# Patient Record
Sex: MALE | Race: WHITE | NOT HISPANIC OR LATINO | Employment: FULL TIME | URBAN - METROPOLITAN AREA
[De-identification: names, ages, dates, MRNs, and addresses within clinical notes are randomized per-mention and may not be internally consistent; named-entity substitution may affect disease eponyms.]

---

## 2017-02-28 ENCOUNTER — OFFICE VISIT (OUTPATIENT)
Dept: FAMILY MEDICINE CLINIC | Facility: CLINIC | Age: 47
End: 2017-02-28

## 2017-02-28 VITALS
OXYGEN SATURATION: 99 % | BODY MASS INDEX: 27.2 KG/M2 | SYSTOLIC BLOOD PRESSURE: 120 MMHG | HEIGHT: 70 IN | DIASTOLIC BLOOD PRESSURE: 66 MMHG | HEART RATE: 104 BPM | TEMPERATURE: 97.8 F | WEIGHT: 190 LBS

## 2017-02-28 DIAGNOSIS — Z72.0 NICOTINE ABUSE: ICD-10-CM

## 2017-02-28 DIAGNOSIS — Z72.0 TOBACCO ABUSE: ICD-10-CM

## 2017-02-28 DIAGNOSIS — M54.5 LOW BACK PAIN, UNSPECIFIED BACK PAIN LATERALITY, UNSPECIFIED CHRONICITY, WITH SCIATICA PRESENCE UNSPECIFIED: ICD-10-CM

## 2017-02-28 DIAGNOSIS — N52.9 ERECTILE DYSFUNCTION, UNSPECIFIED ERECTILE DYSFUNCTION TYPE: Primary | ICD-10-CM

## 2017-03-02 LAB
ALBUMIN SERPL-MCNC: 4 G/DL (ref 3.5–5.2)
ALBUMIN/GLOB SERPL: 1.5 G/DL
ALP SERPL-CCNC: 84 U/L (ref 39–117)
ALT SERPL W P-5'-P-CCNC: 20 U/L (ref 1–41)
ANION GAP SERPL CALCULATED.3IONS-SCNC: 12.7 MMOL/L
AST SERPL-CCNC: 23 U/L (ref 1–40)
BILIRUB SERPL-MCNC: 0.3 MG/DL (ref 0.1–1.2)
BUN BLD-MCNC: 13 MG/DL (ref 6–20)
BUN/CREAT SERPL: 12.6 (ref 7–25)
CALCIUM SPEC-SCNC: 9.6 MG/DL (ref 8.6–10.5)
CHLORIDE SERPL-SCNC: 101 MMOL/L (ref 98–107)
CHOLEST SERPL-MCNC: 166 MG/DL (ref 0–200)
CO2 SERPL-SCNC: 30.3 MMOL/L (ref 22–29)
CREAT BLD-MCNC: 1.03 MG/DL (ref 0.76–1.27)
ERYTHROCYTE [DISTWIDTH] IN BLOOD BY AUTOMATED COUNT: 11.8 % (ref 4.5–15)
GFR SERPL CREATININE-BSD FRML MDRD: 77 ML/MIN/1.73
GLOBULIN UR ELPH-MCNC: 2.6 GM/DL
GLUCOSE BLD-MCNC: 100 MG/DL (ref 65–99)
HCT VFR BLD AUTO: 45.5 % (ref 35–60)
HDLC SERPL-MCNC: 43 MG/DL (ref 40–60)
HGB BLD-MCNC: 15.2 G/DL (ref 13.5–18)
LDLC SERPL CALC-MCNC: 85 MG/DL (ref 0–100)
LDLC/HDLC SERPL: 1.98 {RATIO}
LYMPHOCYTES # BLD AUTO: 2 10*3/MM3 (ref 1.2–3.4)
LYMPHOCYTES NFR BLD AUTO: 33.2 % (ref 21–51)
MCH RBC QN AUTO: 31.3 PG (ref 26.1–33.1)
MCHC RBC AUTO-ENTMCNC: 33.3 G/DL (ref 33–37)
MCV RBC AUTO: 93.8 FL (ref 80–99)
MONOCYTES # BLD AUTO: 0.5 10*3/MM3 (ref 0.1–0.6)
MONOCYTES NFR BLD AUTO: 7.9 % (ref 2–9)
NEUTROPHILS # BLD AUTO: 3.5 10*3/MM3 (ref 1.4–6.5)
NEUTROPHILS NFR BLD AUTO: 58.9 % (ref 42–75)
PLATELET # BLD AUTO: 207 10*3/MM3 (ref 150–450)
PMV BLD AUTO: 7.6 FL (ref 7.1–10.5)
POTASSIUM BLD-SCNC: 5 MMOL/L (ref 3.5–5.2)
PROT SERPL-MCNC: 6.6 G/DL (ref 6–8.5)
RBC # BLD AUTO: 4.85 10*6/MM3 (ref 4–6)
SODIUM BLD-SCNC: 144 MMOL/L (ref 136–145)
TRIGL SERPL-MCNC: 189 MG/DL (ref 0–150)
VLDLC SERPL-MCNC: 37.8 MG/DL (ref 5–40)
WBC NRBC COR # BLD: 5.9 10*3/MM3 (ref 4.5–10)

## 2017-03-02 PROCEDURE — 80053 COMPREHEN METABOLIC PANEL: CPT | Performed by: FAMILY MEDICINE

## 2017-03-02 PROCEDURE — 85025 COMPLETE CBC W/AUTO DIFF WBC: CPT | Performed by: FAMILY MEDICINE

## 2017-03-02 PROCEDURE — 80061 LIPID PANEL: CPT | Performed by: FAMILY MEDICINE

## 2017-03-03 LAB
CONV COMMENT: NORMAL
TESTOST SERPL-MCNC: 376 NG/DL (ref 348–1197)

## 2017-05-11 ENCOUNTER — OFFICE VISIT (OUTPATIENT)
Dept: FAMILY MEDICINE CLINIC | Facility: CLINIC | Age: 47
End: 2017-05-11

## 2017-05-11 VITALS
WEIGHT: 184 LBS | DIASTOLIC BLOOD PRESSURE: 78 MMHG | SYSTOLIC BLOOD PRESSURE: 100 MMHG | HEART RATE: 69 BPM | OXYGEN SATURATION: 95 % | BODY MASS INDEX: 26.34 KG/M2 | RESPIRATION RATE: 16 BRPM | TEMPERATURE: 97.4 F | HEIGHT: 70 IN

## 2017-05-11 DIAGNOSIS — J02.9 ACUTE PHARYNGITIS, UNSPECIFIED ETIOLOGY: Primary | ICD-10-CM

## 2017-05-11 LAB
HETEROPH AB SER QL LA: NEGATIVE
S PYO AG THROAT QL: NEGATIVE

## 2017-05-11 PROCEDURE — 99213 OFFICE O/P EST LOW 20 MIN: CPT | Performed by: NURSE PRACTITIONER

## 2017-05-11 PROCEDURE — 87880 STREP A ASSAY W/OPTIC: CPT | Performed by: NURSE PRACTITIONER

## 2017-05-11 PROCEDURE — 86308 HETEROPHILE ANTIBODY SCREEN: CPT | Performed by: NURSE PRACTITIONER

## 2017-05-11 PROCEDURE — 36415 COLL VENOUS BLD VENIPUNCTURE: CPT | Performed by: NURSE PRACTITIONER

## 2017-05-11 RX ORDER — ACETAMINOPHEN,DIPHENHYDRAMINE HCL 500; 25 MG/1; MG/1
TABLET, FILM COATED ORAL
COMMUNITY
End: 2019-02-07

## 2017-05-11 RX ORDER — CLINDAMYCIN HYDROCHLORIDE 300 MG/1
300 CAPSULE ORAL 3 TIMES DAILY
Qty: 30 CAPSULE | Refills: 0 | Status: SHIPPED | OUTPATIENT
Start: 2017-05-11 | End: 2019-02-07

## 2017-05-16 ENCOUNTER — TELEPHONE (OUTPATIENT)
Dept: FAMILY MEDICINE CLINIC | Facility: CLINIC | Age: 47
End: 2017-05-16

## 2017-05-28 PROCEDURE — 93000 ELECTROCARDIOGRAM COMPLETE: CPT | Performed by: FAMILY MEDICINE

## 2017-05-31 ENCOUNTER — OFFICE VISIT (OUTPATIENT)
Dept: FAMILY MEDICINE CLINIC | Facility: CLINIC | Age: 47
End: 2017-05-31

## 2017-05-31 VITALS
WEIGHT: 191 LBS | OXYGEN SATURATION: 95 % | HEART RATE: 58 BPM | DIASTOLIC BLOOD PRESSURE: 70 MMHG | TEMPERATURE: 97.8 F | SYSTOLIC BLOOD PRESSURE: 120 MMHG | HEIGHT: 70 IN | BODY MASS INDEX: 27.35 KG/M2

## 2017-05-31 DIAGNOSIS — Z00.01 ENCOUNTER FOR PREVENTATIVE ADULT HEALTH CARE EXAM WITH ABNORMAL FINDINGS: Primary | ICD-10-CM

## 2017-05-31 LAB
25(OH)D3 SERPL-MCNC: 27.4 NG/ML (ref 30–100)
ALBUMIN SERPL-MCNC: 4.3 G/DL (ref 3.5–5.2)
ALBUMIN/GLOB SERPL: 1.8 G/DL
ALP SERPL-CCNC: 74 U/L (ref 39–117)
ALT SERPL W P-5'-P-CCNC: 12 U/L (ref 1–41)
ANION GAP SERPL CALCULATED.3IONS-SCNC: 14.6 MMOL/L
AST SERPL-CCNC: 18 U/L (ref 1–40)
BACTERIA UR QL AUTO: NORMAL /HPF
BASOPHILS # BLD AUTO: 0.03 10*3/MM3 (ref 0–0.2)
BASOPHILS NFR BLD AUTO: 0.4 % (ref 0–1.5)
BILIRUB SERPL-MCNC: 0.3 MG/DL (ref 0.1–1.2)
BILIRUB UR QL STRIP: NEGATIVE
BUN BLD-MCNC: 18 MG/DL (ref 6–20)
BUN/CREAT SERPL: 18.6 (ref 7–25)
CALCIUM SPEC-SCNC: 9.4 MG/DL (ref 8.6–10.5)
CHLORIDE SERPL-SCNC: 100 MMOL/L (ref 98–107)
CHOLEST SERPL-MCNC: 141 MG/DL (ref 0–200)
CLARITY UR: CLEAR
CO2 SERPL-SCNC: 26.4 MMOL/L (ref 22–29)
COLOR UR: YELLOW
CREAT BLD-MCNC: 0.97 MG/DL (ref 0.76–1.27)
DEPRECATED RDW RBC AUTO: 39.1 FL (ref 37–54)
EOSINOPHIL # BLD AUTO: 0.33 10*3/MM3 (ref 0–0.7)
EOSINOPHIL NFR BLD AUTO: 4 % (ref 0.3–6.2)
ERYTHROCYTE [DISTWIDTH] IN BLOOD BY AUTOMATED COUNT: 11.8 % (ref 11.5–14.5)
GFR SERPL CREATININE-BSD FRML MDRD: 83 ML/MIN/1.73
GLOBULIN UR ELPH-MCNC: 2.4 GM/DL
GLUCOSE BLD-MCNC: 90 MG/DL (ref 65–99)
GLUCOSE UR STRIP-MCNC: NEGATIVE MG/DL
HCT VFR BLD AUTO: 42.2 % (ref 40.4–52.2)
HDLC SERPL-MCNC: 43 MG/DL (ref 40–60)
HGB BLD-MCNC: 14.8 G/DL (ref 13.7–17.6)
HGB UR QL STRIP.AUTO: NEGATIVE
IMM GRANULOCYTES # BLD: 0.04 10*3/MM3 (ref 0–0.03)
IMM GRANULOCYTES NFR BLD: 0.5 % (ref 0–0.5)
KETONES UR QL STRIP: NEGATIVE
LDLC SERPL CALC-MCNC: 68 MG/DL (ref 0–100)
LDLC/HDLC SERPL: 1.58 {RATIO}
LEUKOCYTE ESTERASE UR QL STRIP.AUTO: NEGATIVE
LYMPHOCYTES # BLD AUTO: 2.84 10*3/MM3 (ref 0.9–4.8)
LYMPHOCYTES NFR BLD AUTO: 34 % (ref 19.6–45.3)
MCH RBC QN AUTO: 32 PG (ref 27–32.7)
MCHC RBC AUTO-ENTMCNC: 35.1 G/DL (ref 32.6–36.4)
MCV RBC AUTO: 91.1 FL (ref 79.8–96.2)
MONOCYTES # BLD AUTO: 1.09 10*3/MM3 (ref 0.2–1.2)
MONOCYTES NFR BLD AUTO: 13.1 % (ref 5–12)
NEUTROPHILS # BLD AUTO: 4.02 10*3/MM3 (ref 1.9–8.1)
NEUTROPHILS NFR BLD AUTO: 48 % (ref 42.7–76)
NITRITE UR QL STRIP: NEGATIVE
PH UR STRIP.AUTO: 5.5 [PH] (ref 4.6–8)
PLATELET # BLD AUTO: 237 10*3/MM3 (ref 140–500)
PMV BLD AUTO: 10.1 FL (ref 6–12)
POTASSIUM BLD-SCNC: 3.9 MMOL/L (ref 3.5–5.2)
PROT SERPL-MCNC: 6.7 G/DL (ref 6–8.5)
PROT UR QL STRIP: NEGATIVE
PSA SERPL-MCNC: 0.6 NG/ML (ref 0–4)
RBC # BLD AUTO: 4.63 10*6/MM3 (ref 4.6–6)
RBC # UR: NORMAL /HPF
REF LAB TEST METHOD: NORMAL
SODIUM BLD-SCNC: 141 MMOL/L (ref 136–145)
SP GR UR STRIP: 1.02 (ref 1–1.03)
SQUAMOUS #/AREA URNS HPF: NORMAL /HPF
T-UPTAKE NFR SERPL: 1.14 TBI (ref 0.8–1.3)
T4 SERPL-MCNC: 5.92 MCG/DL (ref 4.5–11.7)
TRIGL SERPL-MCNC: 150 MG/DL (ref 0–150)
TSH SERPL DL<=0.05 MIU/L-ACNC: 2.57 MIU/ML (ref 0.27–4.2)
UROBILINOGEN UR QL STRIP: NORMAL
VLDLC SERPL-MCNC: 30 MG/DL (ref 5–40)
WBC NRBC COR # BLD: 8.35 10*3/MM3 (ref 4.5–10.7)
WBC UR QL AUTO: NORMAL /HPF

## 2017-05-31 PROCEDURE — 81001 URINALYSIS AUTO W/SCOPE: CPT | Performed by: FAMILY MEDICINE

## 2017-05-31 PROCEDURE — 85025 COMPLETE CBC W/AUTO DIFF WBC: CPT | Performed by: FAMILY MEDICINE

## 2017-05-31 PROCEDURE — 84443 ASSAY THYROID STIM HORMONE: CPT | Performed by: FAMILY MEDICINE

## 2017-05-31 PROCEDURE — 84153 ASSAY OF PSA TOTAL: CPT | Performed by: FAMILY MEDICINE

## 2017-05-31 PROCEDURE — 82306 VITAMIN D 25 HYDROXY: CPT | Performed by: FAMILY MEDICINE

## 2017-05-31 PROCEDURE — 80053 COMPREHEN METABOLIC PANEL: CPT | Performed by: FAMILY MEDICINE

## 2017-05-31 PROCEDURE — 80061 LIPID PANEL: CPT | Performed by: FAMILY MEDICINE

## 2017-05-31 PROCEDURE — 84479 ASSAY OF THYROID (T3 OR T4): CPT | Performed by: FAMILY MEDICINE

## 2017-05-31 PROCEDURE — 84436 ASSAY OF TOTAL THYROXINE: CPT | Performed by: FAMILY MEDICINE

## 2017-05-31 PROCEDURE — 99396 PREV VISIT EST AGE 40-64: CPT | Performed by: FAMILY MEDICINE

## 2017-05-31 PROCEDURE — 71020 XR CHEST PA AND LATERAL: CPT | Performed by: FAMILY MEDICINE

## 2017-06-01 ENCOUNTER — TELEPHONE (OUTPATIENT)
Dept: FAMILY MEDICINE CLINIC | Facility: CLINIC | Age: 47
End: 2017-06-01

## 2017-06-01 NOTE — TELEPHONE ENCOUNTER
Pt informed that papers were sent to front office for him to pick them up,he stated he has to bring papers back so they can be faxed

## 2017-06-13 ENCOUNTER — TELEPHONE (OUTPATIENT)
Dept: FAMILY MEDICINE CLINIC | Facility: CLINIC | Age: 47
End: 2017-06-13

## 2017-06-13 NOTE — TELEPHONE ENCOUNTER
PT WAS FEELING BETTER BUT NOW HE THINKS HE IS STARTING TO GET SICK AGAIN. CAN YOU PLEASE CALL IN SOMETHING TO JUAN AT [facility]?

## 2018-02-20 ENCOUNTER — OFFICE VISIT (OUTPATIENT)
Dept: FAMILY MEDICINE CLINIC | Facility: CLINIC | Age: 48
End: 2018-02-20

## 2018-02-20 VITALS
SYSTOLIC BLOOD PRESSURE: 112 MMHG | DIASTOLIC BLOOD PRESSURE: 86 MMHG | WEIGHT: 202 LBS | TEMPERATURE: 98.2 F | BODY MASS INDEX: 28.92 KG/M2 | OXYGEN SATURATION: 99 % | HEART RATE: 73 BPM | HEIGHT: 70 IN

## 2018-02-20 DIAGNOSIS — M54.5 LOW BACK PAIN, UNSPECIFIED BACK PAIN LATERALITY, UNSPECIFIED CHRONICITY, WITH SCIATICA PRESENCE UNSPECIFIED: Primary | ICD-10-CM

## 2018-02-20 PROCEDURE — 99213 OFFICE O/P EST LOW 20 MIN: CPT | Performed by: FAMILY MEDICINE

## 2018-02-20 NOTE — PROGRESS NOTES
SUBJECTIVE:  The patient is a 47-year-old white male who comes in for follow-up on his chronic low back pain.  He's had this for several years.  He sees pain management occasionally for epidurals.  For the most part he handles it with anti-inflammatories.  His pain is about a 5 on a scale 1-10.  He does a lot of stretching.  He needs FMLA forms.     PAST MEDICAL HISTORY:  Reviewed.    REVIEW OF SYSTEMS:  Please see above; 14 point ROS otherwise negative.      OBJECTIVE: Vitals signs are reviewed and are stable.    HEENT: PERRLA.  []  Neck:  Supple.  []  Lungs:  Clear.    Heart:  Regular rate and rhythm.  []  Abdomen:   Soft, nontender.  []  Back: Nontender full range of motion.  Straight leg raise is negative.  Neurological: Grossly intact without motor or sensory deficits.  Extremities:  No cyanosis, clubbing or edema.  []    ASSESSMENT:    []Chronic back pain    PLAN:  []Forms are completed.  Patient will call if he needs anything.  He is up-to-date on his medication.  He's going to schedule some lab work and prostate examination later this year.    Much of this encounter note is an electronic transcription/translation of spoken language to printed text.  The electronic translation of spoken language may permit erroneous, or at times, nonsensical words or phrases to be inadvertently transcribed.  Although I have reviewed the note for such errors, some may still exist.

## 2018-05-09 ENCOUNTER — TELEPHONE (OUTPATIENT)
Dept: FAMILY MEDICINE CLINIC | Facility: CLINIC | Age: 48
End: 2018-05-09

## 2018-05-09 RX ORDER — MELOXICAM 15 MG/1
15 TABLET ORAL DAILY
Qty: 30 TABLET | Refills: 1 | Status: SHIPPED | OUTPATIENT
Start: 2018-05-09 | End: 2019-12-16

## 2018-05-09 RX ORDER — CYCLOBENZAPRINE HCL 10 MG
10 TABLET ORAL 3 TIMES DAILY PRN
Qty: 30 TABLET | Refills: 1 | Status: SHIPPED | OUTPATIENT
Start: 2018-05-09 | End: 2019-02-07

## 2018-05-09 NOTE — TELEPHONE ENCOUNTER
Pt lvm asking for cyclobenzaprine and meloxicam refills. He had them filled a year ago and he said he was told to call when he needed them.

## 2019-02-04 ENCOUNTER — OFFICE VISIT (OUTPATIENT)
Dept: FAMILY MEDICINE CLINIC | Facility: CLINIC | Age: 49
End: 2019-02-04

## 2019-02-04 VITALS
HEART RATE: 56 BPM | TEMPERATURE: 97.8 F | DIASTOLIC BLOOD PRESSURE: 82 MMHG | OXYGEN SATURATION: 98 % | WEIGHT: 199 LBS | HEIGHT: 70 IN | SYSTOLIC BLOOD PRESSURE: 110 MMHG | BODY MASS INDEX: 28.49 KG/M2

## 2019-02-04 DIAGNOSIS — K62.5 RECTAL BLEEDING: Primary | ICD-10-CM

## 2019-02-04 LAB
ALBUMIN SERPL-MCNC: 4.5 G/DL (ref 3.5–5.2)
ALBUMIN/GLOB SERPL: 1.8 G/DL
ALP SERPL-CCNC: 78 U/L (ref 39–117)
ALT SERPL W P-5'-P-CCNC: 17 U/L (ref 1–41)
ANION GAP SERPL CALCULATED.3IONS-SCNC: 12 MMOL/L
AST SERPL-CCNC: 21 U/L (ref 1–40)
BILIRUB SERPL-MCNC: 0.5 MG/DL (ref 0.1–1.2)
BUN BLD-MCNC: 15 MG/DL (ref 6–20)
BUN/CREAT SERPL: 15.5 (ref 7–25)
CALCIUM SPEC-SCNC: 9.5 MG/DL (ref 8.6–10.5)
CHLORIDE SERPL-SCNC: 104 MMOL/L (ref 98–107)
CO2 SERPL-SCNC: 26 MMOL/L (ref 22–29)
CREAT BLD-MCNC: 0.97 MG/DL (ref 0.76–1.27)
DEVELOPER EXPIRATION DATE: NORMAL
DEVELOPER LOT NUMBER: NORMAL
ERYTHROCYTE [DISTWIDTH] IN BLOOD BY AUTOMATED COUNT: 12.1 % (ref 4.5–15)
EXPIRATION DATE: NORMAL
FECAL OCCULT BLOOD SCREEN, POC: NEGATIVE
GFR SERPL CREATININE-BSD FRML MDRD: 82 ML/MIN/1.73
GLOBULIN UR ELPH-MCNC: 2.5 GM/DL
GLUCOSE BLD-MCNC: 96 MG/DL (ref 65–99)
HCT VFR BLD AUTO: 50 % (ref 35–60)
HGB BLD-MCNC: 16.7 G/DL (ref 13.5–18)
LYMPHOCYTES # BLD AUTO: 1.9 10*3/MM3 (ref 1.2–3.4)
LYMPHOCYTES NFR BLD AUTO: 22.8 % (ref 21–51)
Lab: NORMAL
MCH RBC QN AUTO: 31 PG (ref 26.1–33.1)
MCHC RBC AUTO-ENTMCNC: 33.4 G/DL (ref 33–37)
MCV RBC AUTO: 92.9 FL (ref 80–99)
MONOCYTES # BLD AUTO: 0.8 10*3/MM3 (ref 0.1–0.6)
MONOCYTES NFR BLD AUTO: 9.6 % (ref 2–9)
NEGATIVE CONTROL: NEGATIVE
NEUTROPHILS # BLD AUTO: 5.7 10*3/MM3 (ref 1.4–6.5)
NEUTROPHILS NFR BLD AUTO: 67.6 % (ref 42–75)
PLATELET # BLD AUTO: 252 10*3/MM3 (ref 150–450)
PMV BLD AUTO: 7.4 FL (ref 7.1–10.5)
POSITIVE CONTROL: POSITIVE
POTASSIUM BLD-SCNC: 4.9 MMOL/L (ref 3.5–5.2)
PROT SERPL-MCNC: 7 G/DL (ref 6–8.5)
RBC # BLD AUTO: 5.38 10*6/MM3 (ref 4–6)
SODIUM BLD-SCNC: 142 MMOL/L (ref 136–145)
WBC NRBC COR # BLD: 8.5 10*3/MM3 (ref 4.5–10)

## 2019-02-04 PROCEDURE — 36415 COLL VENOUS BLD VENIPUNCTURE: CPT | Performed by: FAMILY MEDICINE

## 2019-02-04 PROCEDURE — 85025 COMPLETE CBC W/AUTO DIFF WBC: CPT | Performed by: FAMILY MEDICINE

## 2019-02-04 PROCEDURE — 90471 IMMUNIZATION ADMIN: CPT | Performed by: FAMILY MEDICINE

## 2019-02-04 PROCEDURE — 99213 OFFICE O/P EST LOW 20 MIN: CPT | Performed by: FAMILY MEDICINE

## 2019-02-04 PROCEDURE — 82272 OCCULT BLD FECES 1-3 TESTS: CPT | Performed by: FAMILY MEDICINE

## 2019-02-04 PROCEDURE — 90674 CCIIV4 VAC NO PRSV 0.5 ML IM: CPT | Performed by: FAMILY MEDICINE

## 2019-02-04 PROCEDURE — 80053 COMPREHEN METABOLIC PANEL: CPT | Performed by: FAMILY MEDICINE

## 2019-02-04 NOTE — PROGRESS NOTES
SUBJECTIVE:  The patient is a 48-year-old white male who presents with a 3 month history of intermittent rectal bleeding.  He gets occasional discomfort in his lower abdomen.  No upper abdominal pain.  He states his father had colon cancer.  He had colonoscopy in 2015 was found to have a rectal polyp    PAST MEDICAL HISTORY:  Reviewed.    REVIEW OF SYSTEMS:  Please see above; 14 point ROS negative.      OBJECTIVE: Vitals signs are reviewed and are stable.    HEENT: PERRLA.   Neck:  Supple.   Lungs:  Clear.    Heart:  Regular rate and rhythm.   Abdomen:   Soft, nontender.  Bowel sounds present.  No organomegaly or masses.  Rectal: No masses.  Hemoccult negative stool.  Prostate soft nontender.  Extremities:  No cyanosis, clubbing or edema.     ASSESSMENT:    Rectal bleeding  PLAN: He is referred for colonoscopy.  CBC CMP ordered.  He will follow up on labs.  He will call if problems or go to emergency room.    Dictated utilizing Dragon dictation.

## 2019-02-07 ENCOUNTER — HOSPITAL ENCOUNTER (EMERGENCY)
Facility: HOSPITAL | Age: 49
Discharge: HOME | End: 2019-02-08
Attending: EMERGENCY MEDICINE | Admitting: EMERGENCY MEDICINE

## 2019-02-07 ENCOUNTER — APPOINTMENT (OUTPATIENT)
Dept: CT IMAGING | Facility: HOSPITAL | Age: 49
End: 2019-02-07

## 2019-02-07 ENCOUNTER — TELEPHONE (OUTPATIENT)
Dept: FAMILY MEDICINE CLINIC | Facility: CLINIC | Age: 49
End: 2019-02-07

## 2019-02-07 DIAGNOSIS — R55 NEAR SYNCOPE: Primary | ICD-10-CM

## 2019-02-07 LAB
ALBUMIN SERPL-MCNC: 4.2 G/DL (ref 3.5–5.2)
ALBUMIN/GLOB SERPL: 1.7 G/DL
ALP SERPL-CCNC: 68 U/L (ref 39–117)
ALT SERPL W P-5'-P-CCNC: 19 U/L (ref 1–41)
ANION GAP SERPL CALCULATED.3IONS-SCNC: 14.4 MMOL/L
AST SERPL-CCNC: 23 U/L (ref 1–40)
BASOPHILS # BLD AUTO: 0.03 10*3/MM3 (ref 0–0.2)
BASOPHILS NFR BLD AUTO: 0.5 % (ref 0–1.5)
BILIRUB SERPL-MCNC: 0.4 MG/DL (ref 0.1–1.2)
BUN BLD-MCNC: 18 MG/DL (ref 6–20)
BUN/CREAT SERPL: 20.2 (ref 7–25)
CALCIUM SPEC-SCNC: 9 MG/DL (ref 8.6–10.5)
CHLORIDE SERPL-SCNC: 103 MMOL/L (ref 98–107)
CO2 SERPL-SCNC: 23.6 MMOL/L (ref 22–29)
CREAT BLD-MCNC: 0.89 MG/DL (ref 0.76–1.27)
DEPRECATED RDW RBC AUTO: 39.2 FL (ref 37–54)
EOSINOPHIL # BLD AUTO: 0.19 10*3/MM3 (ref 0–0.7)
EOSINOPHIL NFR BLD AUTO: 3.2 % (ref 0.3–6.2)
ERYTHROCYTE [DISTWIDTH] IN BLOOD BY AUTOMATED COUNT: 11.8 % (ref 11.5–14.5)
GFR SERPL CREATININE-BSD FRML MDRD: 91 ML/MIN/1.73
GLOBULIN UR ELPH-MCNC: 2.5 GM/DL
GLUCOSE BLD-MCNC: 86 MG/DL (ref 65–99)
HCT VFR BLD AUTO: 44.1 % (ref 40.4–52.2)
HGB BLD-MCNC: 15.6 G/DL (ref 13.7–17.6)
IMM GRANULOCYTES # BLD AUTO: 0.02 10*3/MM3 (ref 0–0.03)
IMM GRANULOCYTES NFR BLD AUTO: 0.3 % (ref 0–0.5)
INR PPP: 1.01 (ref 0.9–1.1)
LYMPHOCYTES # BLD AUTO: 1.78 10*3/MM3 (ref 0.9–4.8)
LYMPHOCYTES NFR BLD AUTO: 30.3 % (ref 19.6–45.3)
MCH RBC QN AUTO: 32.2 PG (ref 27–32.7)
MCHC RBC AUTO-ENTMCNC: 35.4 G/DL (ref 32.6–36.4)
MCV RBC AUTO: 90.9 FL (ref 79.8–96.2)
MONOCYTES # BLD AUTO: 0.73 10*3/MM3 (ref 0.2–1.2)
MONOCYTES NFR BLD AUTO: 12.4 % (ref 5–12)
NEUTROPHILS # BLD AUTO: 3.15 10*3/MM3 (ref 1.9–8.1)
NEUTROPHILS NFR BLD AUTO: 53.6 % (ref 42.7–76)
PLATELET # BLD AUTO: 237 10*3/MM3 (ref 140–500)
PMV BLD AUTO: 9.8 FL (ref 6–12)
POTASSIUM BLD-SCNC: 3.9 MMOL/L (ref 3.5–5.2)
PROT SERPL-MCNC: 6.7 G/DL (ref 6–8.5)
PROTHROMBIN TIME: 13.1 SECONDS (ref 11.7–14.2)
RBC # BLD AUTO: 4.85 10*6/MM3 (ref 4.6–6)
SODIUM BLD-SCNC: 141 MMOL/L (ref 136–145)
TROPONIN T SERPL-MCNC: <0.01 NG/ML (ref 0–0.03)
WBC NRBC COR # BLD: 5.88 10*3/MM3 (ref 4.5–10.7)

## 2019-02-07 PROCEDURE — 74177 CT ABD & PELVIS W/CONTRAST: CPT

## 2019-02-07 PROCEDURE — 96374 THER/PROPH/DIAG INJ IV PUSH: CPT

## 2019-02-07 PROCEDURE — 25010000002 IOPAMIDOL 61 % SOLUTION: Performed by: EMERGENCY MEDICINE

## 2019-02-07 PROCEDURE — 93005 ELECTROCARDIOGRAM TRACING: CPT | Performed by: EMERGENCY MEDICINE

## 2019-02-07 PROCEDURE — 85610 PROTHROMBIN TIME: CPT | Performed by: EMERGENCY MEDICINE

## 2019-02-07 PROCEDURE — 93010 ELECTROCARDIOGRAM REPORT: CPT | Performed by: INTERNAL MEDICINE

## 2019-02-07 PROCEDURE — 84484 ASSAY OF TROPONIN QUANT: CPT | Performed by: EMERGENCY MEDICINE

## 2019-02-07 PROCEDURE — 96375 TX/PRO/DX INJ NEW DRUG ADDON: CPT

## 2019-02-07 PROCEDURE — 85025 COMPLETE CBC W/AUTO DIFF WBC: CPT | Performed by: EMERGENCY MEDICINE

## 2019-02-07 PROCEDURE — 25010000002 DIPHENHYDRAMINE PER 50 MG: Performed by: EMERGENCY MEDICINE

## 2019-02-07 PROCEDURE — 25010000002 METHYLPREDNISOLONE PER 40 MG: Performed by: EMERGENCY MEDICINE

## 2019-02-07 PROCEDURE — 99285 EMERGENCY DEPT VISIT HI MDM: CPT

## 2019-02-07 PROCEDURE — 80053 COMPREHEN METABOLIC PANEL: CPT | Performed by: EMERGENCY MEDICINE

## 2019-02-07 RX ORDER — DIPHENHYDRAMINE HCL 50 MG
50 CAPSULE ORAL EVERY 6 HOURS PRN
COMMUNITY

## 2019-02-07 RX ORDER — METHYLPREDNISOLONE SODIUM SUCCINATE 40 MG/ML
40 INJECTION, POWDER, LYOPHILIZED, FOR SOLUTION INTRAMUSCULAR; INTRAVENOUS EVERY 4 HOURS
Status: DISCONTINUED | OUTPATIENT
Start: 2019-02-07 | End: 2019-02-08 | Stop reason: HOSPADM

## 2019-02-07 RX ORDER — SODIUM CHLORIDE 0.9 % (FLUSH) 0.9 %
10 SYRINGE (ML) INJECTION AS NEEDED
Status: DISCONTINUED | OUTPATIENT
Start: 2019-02-07 | End: 2019-02-08 | Stop reason: HOSPADM

## 2019-02-07 RX ORDER — ACETAMINOPHEN 500 MG
500 TABLET ORAL EVERY 6 HOURS PRN
COMMUNITY

## 2019-02-07 RX ORDER — DIPHENHYDRAMINE HYDROCHLORIDE 50 MG/ML
50 INJECTION INTRAMUSCULAR; INTRAVENOUS ONCE
Status: COMPLETED | OUTPATIENT
Start: 2019-02-07 | End: 2019-02-07

## 2019-02-07 RX ADMIN — IOPAMIDOL 85 ML: 612 INJECTION, SOLUTION INTRAVENOUS at 23:25

## 2019-02-07 RX ADMIN — DIPHENHYDRAMINE HYDROCHLORIDE 50 MG: 50 INJECTION, SOLUTION INTRAMUSCULAR; INTRAVENOUS at 22:49

## 2019-02-07 RX ADMIN — METHYLPREDNISOLONE SODIUM SUCCINATE 40 MG: 40 INJECTION, POWDER, FOR SOLUTION INTRAMUSCULAR; INTRAVENOUS at 22:48

## 2019-02-08 VITALS
HEIGHT: 70 IN | DIASTOLIC BLOOD PRESSURE: 93 MMHG | OXYGEN SATURATION: 100 % | HEART RATE: 56 BPM | RESPIRATION RATE: 18 BRPM | SYSTOLIC BLOOD PRESSURE: 133 MMHG | WEIGHT: 189 LBS | BODY MASS INDEX: 27.06 KG/M2 | TEMPERATURE: 97 F

## 2019-02-08 NOTE — ED PROVIDER NOTES
" EMERGENCY DEPARTMENT ENCOUNTER    CHIEF COMPLAINT  Chief Complaint: near syncope  History given by: patient  History limited by: none  Room Number: 05/05  PMD: Pedro Roa MD      HPI:  Pt is a 48 y.o. male who presents complaining of near syncope while at work PTA. Pt states he got dizzy tonight at work and became tachycardic and diaphoretic. Pt states he fell back but denies head injury. Pt states he had similar episode of dizziness yesterday while at work described as room spinning. 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 Pt denies heavy lifting. Pt also complains of abd pain, nausea, fatigue, and loss of appeite. Pt states that he has lost 10 lbs in several weeks without trying. Pt denies vomiting, fever, cp, and SOA. Pt states for the past 3 months he has been passing \"blood clots\" everytime that he has a bowel movement. Pt states there is also blood on the toilet paper. Pt states he followed up with his PCP 4 days ago who took blood work which he has not heard the results of. Family at bedside      Duration:  PTA  Onset: gradual  Location: diffuse  Quality: near  Intensity/Severity: moderate  Progression: unchanged  Associated Symptoms: tachycardic, diaphoretic, dizziness,  abd pain, nausea, fatigue, loss of appetite, weight loss, blood in stool  Treatment before arrival: Pt followed up with his PCP 4 days ago for blood in the stool.     PAST MEDICAL HISTORY  Active Ambulatory Problems     Diagnosis Date Noted   • Arthritis    • Allergic      Resolved Ambulatory Problems     Diagnosis Date Noted   • No Resolved Ambulatory Problems     Past Medical History:   Diagnosis Date   • Allergic    • Arthritis    • Renal disorder    • Ulcer of abdomen wall (CMS/HCC)        PAST SURGICAL HISTORY  Past Surgical History:   Procedure Laterality Date   • COLONOSCOPY     • KIDNEY STONE SURGERY     • SHOULDER SURGERY      bilateral        FAMILY HISTORY  Family History   Problem Relation Age of " Onset   • Heart attack Mother        SOCIAL HISTORY  Social History     Socioeconomic History   • Marital status:      Spouse name: Not on file   • Number of children: Not on file   • Years of education: Not on file   • Highest education level: Not on file   Social Needs   • Financial resource strain: Not on file   • Food insecurity - worry: Not on file   • Food insecurity - inability: Not on file   • Transportation needs - medical: Not on file   • Transportation needs - non-medical: Not on file   Occupational History   • Not on file   Tobacco Use   • Smoking status: Light Tobacco Smoker     Types: Cigars   Substance and Sexual Activity   • Alcohol use: Yes   • Drug use: No   • Sexual activity: Yes   Other Topics Concern   • Not on file   Social History Narrative   • Not on file       ALLERGIES  Contrast dye; Erythromycin; Iodine; and Penicillins    REVIEW OF SYSTEMS  Review of Systems   Constitutional: Positive for appetite change (loss), diaphoresis, fatigue and unexpected weight change (loss).   Cardiovascular:        (+) tachycardia   Gastrointestinal: Positive for abdominal pain, blood in stool and nausea. Negative for vomiting.   Neurological: Positive for dizziness and syncope (near).       PHYSICAL EXAM  ED Triage Vitals [02/07/19 2046]   Temp Heart Rate Resp BP SpO2   95.6 °F (35.3 °C) 60 18 -- 98 %      Temp src Heart Rate Source Patient Position BP Location FiO2 (%)   Tympanic Monitor -- -- --       Physical Exam   Constitutional: He is oriented to person, place, and time. No distress.   NAD   Cardiovascular: Normal rate, regular rhythm and normal heart sounds.   Pulmonary/Chest: Effort normal and breath sounds normal. No respiratory distress.   Abdominal: Soft. Bowel sounds are normal. He exhibits no distension. There is no tenderness.   Genitourinary:   Genitourinary Comments: No rectal performed   Musculoskeletal: Normal range of motion. He exhibits no deformity.   Neurological: He is alert and  oriented to person, place, and time. GCS score is 15.       LAB RESULTS  Lab Results (last 24 hours)     Procedure Component Value Units Date/Time    CBC & Differential [[id number]] Collected:  02/07/19 2131    Specimen:  Blood Updated:  02/07/19 2153    Narrative:       The following orders were created for panel order CBC & Differential.  Procedure                               Abnormality         Status                     ---------                               -----------         ------                     CBC Auto Differential[[id number]]        Abnormal            Final result                 Please view results for these tests on the individual orders.    Comprehensive Metabolic Panel [[id number]] Collected:  02/07/19 2131    Specimen:  Blood Updated:  02/07/19 2220     Glucose 86 mg/dL      BUN 18 mg/dL      Creatinine 0.89 mg/dL      Sodium 141 mmol/L      Potassium 3.9 mmol/L      Chloride 103 mmol/L      CO2 23.6 mmol/L      Calcium 9.0 mg/dL      Total Protein 6.7 g/dL      Albumin 4.20 g/dL      ALT (SGPT) 19 U/L      AST (SGOT) 23 U/L      Alkaline Phosphatase 68 U/L      Total Bilirubin 0.4 mg/dL      eGFR Non African Amer 91 mL/min/1.73      Globulin 2.5 gm/dL      A/G Ratio 1.7 g/dL      BUN/Creatinine Ratio 20.2     Anion Gap 14.4 mmol/L     Protime-INR [[id number]]  (Normal) Collected:  02/07/19 2131    Specimen:  Blood Updated:  02/07/19 2202     Protime 13.1 Seconds      INR 1.01    Troponin [[id number]]  (Normal) Collected:  02/07/19 2131    Specimen:  Blood Updated:  02/07/19 2220     Troponin T <0.010 ng/mL     Narrative:       Troponin T Reference Range:  <= 0.03 ng/mL-   Negative for AMI  >0.03 ng/mL-     Abnormal for myocardial necrosis.  Clinicians would have to utilize clinical acumen, EKG, Troponin and serial changes to determine if it is an Acute Myocardial Infarction or myocardial injury due to an underlying chronic condition.     CBC Auto Differential [[id number]]  (Abnormal) Collected:   02/07/19 2131    Specimen:  Blood Updated:  02/07/19 2153     WBC 5.88 10*3/mm3      RBC 4.85 10*6/mm3      Hemoglobin 15.6 g/dL      Hematocrit 44.1 %      MCV 90.9 fL      MCH 32.2 pg      MCHC 35.4 g/dL      RDW 11.8 %      RDW-SD 39.2 fl      MPV 9.8 fL      Platelets 237 10*3/mm3      Neutrophil % 53.6 %      Lymphocyte % 30.3 %      Monocyte % 12.4 %      Eosinophil % 3.2 %      Basophil % 0.5 %      Immature Grans % 0.3 %      Neutrophils, Absolute 3.15 10*3/mm3      Lymphocytes, Absolute 1.78 10*3/mm3      Monocytes, Absolute 0.73 10*3/mm3      Eosinophils, Absolute 0.19 10*3/mm3      Basophils, Absolute 0.03 10*3/mm3      Immature Grans, Absolute 0.02 10*3/mm3           I ordered the above labs and reviewed the results    RADIOLOGY  CT Abdomen Pelvis With Contrast   Final Result   IMPRESSION :    1. Extensive fatty liver, no acute inflammation or abnormal enhancement           This report was finalized on 2/7/2019 11:37 PM by Vince Verdin M.D.               I ordered the above noted radiological studies. Interpreted by radiologist. Reviewed by me in PACS.       PROCEDURES  Procedures    EKG          EKG time: 2124  Rhythm/Rate: NSR 48 BPM  P waves and HI: normal  QRS, axis: normal   ST and T waves: normal     Interpreted Contemporaneously by me, independently viewed  Slightly slower rate compared to prior 1/7/16    PROGRESS AND CONSULTS     2120-Ordered lab work and orthostatics for further evaluation.     2202-Pt was not orthostatic per nurse.     2214-Ordered CT abd/pelvis for further evaluation. Ordered solu-medrol and Benadryl for contrast allergy pre-medication.     2343-Rechecked pt. Pt is resting comfortably. Notified pt of troponin<0.010, WBC-5.88, hemoglobin-15.6 and CT abd/pelvis-unremarkable except fatty liver. Informed pt that is nothing emergent to do in the ED. Discussed the plan to discharge the pt home. I instructed the pt to f/u with his PCP. Informed pt to stay hydrated. Instructed pt  that he feels near syncopal to sit down and follow proper precautions as discussed. Pt understands and agrees with the plan, all questions answered.      MEDICAL DECISION MAKING  Results were reviewed/discussed with the patient and they were also made aware of online access. Pt also made aware that some labs, such as cultures, will not be resulted during ER visit and follow up with PMD is necessary.     MDM  Number of Diagnoses or Management Options     Amount and/or Complexity of Data Reviewed  Clinical lab tests: reviewed and ordered (Troponin<0.010  WBC-5.88  hemoglobin-15.6)  Tests in the radiology section of CPT®: reviewed and ordered (CT abd/pelvis-fatty liver otherwise unremarkable)  Tests in the medicine section of CPT®: reviewed (See EKG procedure note)  Decide to obtain previous medical records or to obtain history from someone other than the patient: yes  Independent visualization of images, tracings, or specimens: yes           DIAGNOSIS  Final diagnoses:   Near syncope       DISPOSITION  DISCHARGE    Patient discharged in stable condition.    Reviewed implications of results, diagnosis, meds, responsibility to follow up, warning signs and symptoms of possible worsening, potential complications and reasons to return to ER.    Patient/Family voiced understanding of above instructions.    Discussed plan for discharge, as there is no emergent indication for admission. Patient referred to primary care provider for BP management due to today's BP. Pt/family is agreeable and understands need for follow up and repeat testing.  Pt is aware that discharge does not mean that nothing is wrong but it indicates no emergency is present that requires admission and they must continue care with follow-up as given below or physician of their choice.     FOLLOW-UP  Pedro Roa MD  [address] [place], [place] [id number] [phone]    Schedule an appointment as soon as possible for a visit       [facility]  [facility]  [address] [facility] [address] [phone]    As needed, If symptoms worsen         Medication List      Stop    Cetirizine HCl 10 MG tablet dispersible     clindamycin 300 MG capsule  Commonly known as:  CLEOCIN     cyclobenzaprine 10 MG tablet  Commonly known as:  FLEXERIL     diphenhydrAMINE 25 mg  Commonly known as:  BENADRYL     diphenhydrAMINE-acetaminophen  MG tablet per tablet  Commonly known as:  TYLENOL PM     fexofenadine 180 MG tablet  Commonly known as:  ALLEGRA     MULTIPLE VITAMINS PO     multivitamin tablet tablet              Latest Documented Vital Signs:  As of 11:54 PM  BP- 133/93 HR- 51 Temp- 95.6 °F (35.3 °C) (Tympanic) O2 sat- 100%    --  Documentation assistance provided by gabriel Mary for MD Dharmesh.  Information recorded by the scribe was done at my direction and has been verified and validated by me.       Radha Mary  02/07/19 [id number]       Carlos Alvarado MD  02/07/19 9075

## 2019-02-08 NOTE — ED NOTES
"Pt reports near syncope yesterday. Became dizzy and light headed at work at lunch time. This morning pt experienced near syncope again and decided to lay down for a while. Then pt went to work tonight and experienced dizziness and lightheaded episode for 3rd time so decided to come to Er.      Maryellen Torres, RN  02/07/19 2133    Pt reports did not actually fall and denies LOC.states \"I just like fell to my knees just [id number] to catch myself and my balance so I didn't hit my head\"     Pt is bradycardic. 52 bpm     Maryellen Torres, RN  02/07/19 2137    "

## 2019-02-11 NOTE — TELEPHONE ENCOUNTER
Patient went to er on 2/7 after episode of confusion and vertigo is still having sx of vertigo off and on worse when stands up, the confusion is better but vertigo not.

## 2019-02-12 ENCOUNTER — OFFICE VISIT (OUTPATIENT)
Dept: FAMILY MEDICINE CLINIC | Facility: CLINIC | Age: 49
End: 2019-02-12

## 2019-02-12 ENCOUNTER — OFFICE VISIT (OUTPATIENT)
Dept: GASTROENTEROLOGY | Facility: CLINIC | Age: 49
End: 2019-02-12

## 2019-02-12 VITALS
TEMPERATURE: 97.6 F | HEIGHT: 70 IN | BODY MASS INDEX: 27.92 KG/M2 | WEIGHT: 195 LBS | SYSTOLIC BLOOD PRESSURE: 112 MMHG | DIASTOLIC BLOOD PRESSURE: 70 MMHG

## 2019-02-12 VITALS
BODY MASS INDEX: 28.06 KG/M2 | TEMPERATURE: 97.8 F | SYSTOLIC BLOOD PRESSURE: 122 MMHG | HEART RATE: 74 BPM | RESPIRATION RATE: 18 BRPM | DIASTOLIC BLOOD PRESSURE: 80 MMHG | WEIGHT: 196 LBS | OXYGEN SATURATION: 96 % | HEIGHT: 70 IN

## 2019-02-12 DIAGNOSIS — K31.9 DYSPEPSIA AND DISORDER OF FUNCTION OF STOMACH: ICD-10-CM

## 2019-02-12 DIAGNOSIS — K92.1 HEMATOCHEZIA: Primary | ICD-10-CM

## 2019-02-12 DIAGNOSIS — R68.81 EARLY SATIETY: ICD-10-CM

## 2019-02-12 DIAGNOSIS — R10.13 DYSPEPSIA AND DISORDER OF FUNCTION OF STOMACH: ICD-10-CM

## 2019-02-12 DIAGNOSIS — R42 VERTIGO: Primary | ICD-10-CM

## 2019-02-12 PROCEDURE — 99204 OFFICE O/P NEW MOD 45 MIN: CPT | Performed by: INTERNAL MEDICINE

## 2019-02-12 PROCEDURE — 99213 OFFICE O/P EST LOW 20 MIN: CPT | Performed by: FAMILY MEDICINE

## 2019-02-12 RX ORDER — PANTOPRAZOLE SODIUM 40 MG/1
40 TABLET, DELAYED RELEASE ORAL DAILY
Qty: 90 TABLET | Refills: 3 | Status: SHIPPED | OUTPATIENT
Start: 2019-02-12 | End: 2019-10-22

## 2019-02-12 RX ORDER — MECLIZINE HYDROCHLORIDE 25 MG/1
25 TABLET ORAL 3 TIMES DAILY PRN
Qty: 21 TABLET | Refills: 0 | Status: SHIPPED | OUTPATIENT
Start: 2019-02-12 | End: 2019-10-22

## 2019-02-12 NOTE — PROGRESS NOTES
SUBJECTIVE:  The patient is a 48-year-old white male is here for follow-up.  He was seen in the emergency room on February 7 following episode of vertigo and rectal bleeding.  He is scheduled to see gastroenterology today.  He was found to have a fatty liver on CAT scan.  See emergency room report.  He is still having the vertigo but not as bad as he did the night he went to the emergency room.    PAST MEDICAL HISTORY:  Reviewed.    REVIEW OF SYSTEMS:  Please see above; 14 point ROS negative.      OBJECTIVE:   Vitals signs are reviewed and are stable.    General:  Well-nourished.  Alert and oriented x3 in no acute distress.  HEENT: PERRLA.   Neck:  Supple.   Lungs:  Clear.    Heart:  Regular rate and rhythm.   Abdomen:   Soft, nontender.   Extremities:  No cyanosis, clubbing or edema.   Neurological:  Grossly intact without motor or sensory deficits.     ASSESSMENT:    BPV  Rectal bleeding  Vertigo  PLAN: Keep appointment with your gastroenterologist.  Suggest referral for Epley maneuver.  Patient wants to try meclizine.  He works 12 hours a day 6 days a week.  He is to use caution when he moves his head and neck.  He should go to emergency room if any problems.    Dictated utilizing Dragon dictation.

## 2019-02-12 NOTE — PROGRESS NOTES
Chief Complaint   Patient presents with   • Rectal Bleeding   • Nausea   • Bloated     Jamil Kamara is a 48 y.o. male who presents with a history of hematochezia and dyspepsia   HPI     Patient 48-year-old male with history of dyspepsia and hematochezia.  Patient reports 3 months of symptoms with rectal bleeding with clots as well as decreased appetite and early satiety.  Patient has been losing weight.  Patient is tried Mylanta without significant improvement.  Patient seen in the ER where labs were actually normal though CT revealed fatty liver.  Patient's liver enzymes were all normal at that time.  Patient does report family history significant for father with colon cancer in 2013 had an EGD and colonoscopy the patient reports very similar symptoms.  That is when he was told of the ulcer.  Patient here for further recommendations.    Past Medical History:   Diagnosis Date   • Allergic    • Arthritis    • Renal disorder    • Ulcer of abdomen wall (CMS/HCC)        Current Outpatient Medications:   •  diphenhydrAMINE (BENADRYL) 50 MG capsule, Take 50 mg by mouth Every 6 (Six) Hours As Needed for Itching., Disp: , Rfl:   •  meloxicam (MOBIC) 15 MG tablet, Take 1 tablet by mouth Daily. With food (Patient taking differently: Take 15 mg by mouth As Needed. With food), Disp: 30 tablet, Rfl: 1  •  acetaminophen (TYLENOL) 500 MG tablet, Take 500 mg by mouth Every 6 (Six) Hours As Needed for Mild Pain ., Disp: , Rfl:   •  meclizine (ANTIVERT) 25 MG tablet, Take 1 tablet by mouth 3 (Three) Times a Day As Needed for dizziness., Disp: 21 tablet, Rfl: 0  •  pantoprazole (PROTONIX) 40 MG EC tablet, Take 1 tablet by mouth Daily., Disp: 90 tablet, Rfl: 3  Allergies   Allergen Reactions   • Contrast Dye Hives   • Erythromycin    • Penicillins Unknown (See Comments)     Severe reaction as a child    • Iodine Hives     Social History     Socioeconomic History   • Marital status:      Spouse name: Not on file   • Number  of children: Not on file   • Years of education: Not on file   • Highest education level: Not on file   Social Needs   • Financial resource strain: Not on file   • Food insecurity - worry: Not on file   • Food insecurity - inability: Not on file   • Transportation needs - medical: Not on file   • Transportation needs - non-medical: Not on file   Occupational History   • Not on file   Tobacco Use   • Smoking status: Light Tobacco Smoker     Types: Cigars   • Smokeless tobacco: Never Used   • Tobacco comment: a few cigars a year    Substance and Sexual Activity   • Alcohol use: Yes     Comment: social    • Drug use: No   • Sexual activity: Yes   Other Topics Concern   • Not on file   Social History Narrative   • Not on file     Family History   Problem Relation Age of Onset   • Heart attack Mother    • Colon cancer Father      Review of Systems   Constitutional: Negative.    HENT: Negative.    Eyes: Negative.    Respiratory: Negative.    Cardiovascular: Negative.    Gastrointestinal: Positive for abdominal distention, blood in stool and nausea. Negative for abdominal pain, constipation, diarrhea, rectal pain and vomiting.   Endocrine: Negative.    Musculoskeletal: Negative.    Skin: Negative.    Allergic/Immunologic: Negative.    Neurological: Positive for dizziness.   Hematological: Negative.      Vitals:    02/12/19 1358   BP: 112/70   Temp: 97.6 °F (36.4 °C)     Physical Exam   Constitutional: He is oriented to person, place, and time. He appears well-developed and well-nourished.   HENT:   Head: Normocephalic and atraumatic.   Eyes: Conjunctivae and EOM are normal. Pupils are equal, round, and reactive to light. No scleral icterus.   Neck: Normal range of motion. No tracheal deviation present.   Cardiovascular: Normal rate and regular rhythm. Exam reveals no gallop and no friction rub.   No murmur heard.  Pulmonary/Chest: Effort normal and breath sounds normal. No respiratory distress.   Abdominal: Soft. Bowel  sounds are normal. He exhibits no distension and no mass. There is no tenderness. There is no rebound and no guarding.   Musculoskeletal: Normal range of motion. He exhibits no edema.   Neurological: He is alert and oriented to person, place, and time. No cranial nerve deficit.   Skin: Skin is warm and dry. No rash noted.   Psychiatric: He has a normal mood and affect. His behavior is normal.   Nursing note and vitals reviewed.    Diagnoses and all orders for this visit:    Hematochezia  -     Case Request; Standing  -     Implement Anesthesia orders day of procedure.; Standing  -     Obtain informed consent; Standing  -     Case Request    Early satiety  -     Case Request; Standing  -     Implement Anesthesia orders day of procedure.; Standing  -     Obtain informed consent; Standing  -     Case Request    Dyspepsia and disorder of function of stomach  -     Case Request; Standing  -     Implement Anesthesia orders day of procedure.; Standing  -     Obtain informed consent; Standing  -     Case Request    Other orders  -     pantoprazole (PROTONIX) 40 MG EC tablet; Take 1 tablet by mouth Daily.    Patient 48-year-old male with family history significant for father with colon cancer and personal history of ulcer disease presenting with hematochezia early satiety and weight loss.  In the ER patient noted with normal hemoglobin and normal LFTs but did have fatty liver.  At this point would proceed with Protonix 40 mg daily due to upper symptoms and arrange EGD and colonoscopy to evaluate.

## 2019-02-20 ENCOUNTER — ANESTHESIA (OUTPATIENT)
Dept: GASTROENTEROLOGY | Facility: HOSPITAL | Age: 49
End: 2019-02-20

## 2019-02-20 ENCOUNTER — HOSPITAL ENCOUNTER (OUTPATIENT)
Facility: HOSPITAL | Age: 49
Setting detail: HOSPITAL OUTPATIENT SURGERY
Discharge: HOME | End: 2019-02-20
Attending: INTERNAL MEDICINE | Admitting: INTERNAL MEDICINE

## 2019-02-20 ENCOUNTER — ANESTHESIA EVENT (OUTPATIENT)
Dept: GASTROENTEROLOGY | Facility: HOSPITAL | Age: 49
End: 2019-02-20

## 2019-02-20 VITALS
SYSTOLIC BLOOD PRESSURE: 123 MMHG | HEART RATE: 54 BPM | BODY MASS INDEX: 27.38 KG/M2 | HEIGHT: 70 IN | DIASTOLIC BLOOD PRESSURE: 52 MMHG | TEMPERATURE: 97.4 F | WEIGHT: 191.25 LBS | RESPIRATION RATE: 17 BRPM | OXYGEN SATURATION: 99 %

## 2019-02-20 DIAGNOSIS — K63.89 COLONIC MASS: Primary | ICD-10-CM

## 2019-02-20 DIAGNOSIS — C20 RECTAL CANCER (HCC): Primary | ICD-10-CM

## 2019-02-20 DIAGNOSIS — K92.1 HEMATOCHEZIA: ICD-10-CM

## 2019-02-20 DIAGNOSIS — K31.9 DYSPEPSIA AND DISORDER OF FUNCTION OF STOMACH: ICD-10-CM

## 2019-02-20 DIAGNOSIS — R10.13 DYSPEPSIA AND DISORDER OF FUNCTION OF STOMACH: ICD-10-CM

## 2019-02-20 DIAGNOSIS — R68.81 EARLY SATIETY: ICD-10-CM

## 2019-02-20 LAB
ALBUMIN SERPL-MCNC: 3.9 G/DL (ref 3.5–5.2)
ALBUMIN/GLOB SERPL: 1.5 G/DL
ALP SERPL-CCNC: 63 U/L (ref 39–117)
ALT SERPL W P-5'-P-CCNC: 29 U/L (ref 1–41)
ANION GAP SERPL CALCULATED.3IONS-SCNC: 9.5 MMOL/L
AST SERPL-CCNC: 35 U/L (ref 1–40)
BILIRUB SERPL-MCNC: 0.5 MG/DL (ref 0.1–1.2)
BUN BLD-MCNC: 9 MG/DL (ref 6–20)
BUN/CREAT SERPL: 9.1 (ref 7–25)
CALCIUM SPEC-SCNC: 9.2 MG/DL (ref 8.6–10.5)
CEA SERPL-MCNC: 4.97 NG/ML
CHLORIDE SERPL-SCNC: 102 MMOL/L (ref 98–107)
CO2 SERPL-SCNC: 26.5 MMOL/L (ref 22–29)
CREAT BLD-MCNC: 0.99 MG/DL (ref 0.76–1.27)
DEPRECATED RDW RBC AUTO: 39.6 FL (ref 37–54)
ERYTHROCYTE [DISTWIDTH] IN BLOOD BY AUTOMATED COUNT: 11.6 % (ref 12.3–15.4)
GFR SERPL CREATININE-BSD FRML MDRD: 80 ML/MIN/1.73
GLOBULIN UR ELPH-MCNC: 2.6 GM/DL
GLUCOSE BLD-MCNC: 104 MG/DL (ref 65–99)
HCT VFR BLD AUTO: 47.3 % (ref 37.5–51)
HGB BLD-MCNC: 15.8 G/DL (ref 13–17.7)
MCH RBC QN AUTO: 31.2 PG (ref 26.6–33)
MCHC RBC AUTO-ENTMCNC: 33.4 G/DL (ref 31.5–35.7)
MCV RBC AUTO: 93.5 FL (ref 79–97)
PLATELET # BLD AUTO: 248 10*3/MM3 (ref 140–450)
PMV BLD AUTO: 9.4 FL (ref 6–12)
POTASSIUM BLD-SCNC: 5.6 MMOL/L (ref 3.5–5.2)
PROT SERPL-MCNC: 6.5 G/DL (ref 6–8.5)
RBC # BLD AUTO: 5.06 10*6/MM3 (ref 4.14–5.8)
SODIUM BLD-SCNC: 138 MMOL/L (ref 136–145)
WBC NRBC COR # BLD: 6.81 10*3/MM3 (ref 3.4–10.8)

## 2019-02-20 PROCEDURE — 88305 TISSUE EXAM BY PATHOLOGIST: CPT | Performed by: INTERNAL MEDICINE

## 2019-02-20 PROCEDURE — 25010000002 PROPOFOL 10 MG/ML EMULSION: Performed by: ANESTHESIOLOGY

## 2019-02-20 PROCEDURE — 43239 EGD BIOPSY SINGLE/MULTIPLE: CPT | Performed by: INTERNAL MEDICINE

## 2019-02-20 PROCEDURE — 45385 COLONOSCOPY W/LESION REMOVAL: CPT | Performed by: INTERNAL MEDICINE

## 2019-02-20 PROCEDURE — 80053 COMPREHEN METABOLIC PANEL: CPT | Performed by: SURGERY

## 2019-02-20 PROCEDURE — 82378 CARCINOEMBRYONIC ANTIGEN: CPT | Performed by: SURGERY

## 2019-02-20 PROCEDURE — 45380 COLONOSCOPY AND BIOPSY: CPT | Performed by: INTERNAL MEDICINE

## 2019-02-20 PROCEDURE — 85027 COMPLETE CBC AUTOMATED: CPT | Performed by: SURGERY

## 2019-02-20 RX ORDER — SODIUM CHLORIDE, SODIUM LACTATE, POTASSIUM CHLORIDE, CALCIUM CHLORIDE 600; 310; 30; 20 MG/100ML; MG/100ML; MG/100ML; MG/100ML
1000 INJECTION, SOLUTION INTRAVENOUS CONTINUOUS
Status: DISCONTINUED | OUTPATIENT
Start: 2019-02-20 | End: 2019-02-20 | Stop reason: HOSPADM

## 2019-02-20 RX ORDER — PREDNISONE 50 MG/1
50 TABLET ORAL EVERY 6 HOURS
Status: DISCONTINUED | OUTPATIENT
Start: 2019-02-20 | End: 2019-02-20 | Stop reason: HOSPADM

## 2019-02-20 RX ORDER — PROPOFOL 10 MG/ML
VIAL (ML) INTRAVENOUS AS NEEDED
Status: DISCONTINUED | OUTPATIENT
Start: 2019-02-20 | End: 2019-02-20 | Stop reason: SURG

## 2019-02-20 RX ORDER — LIDOCAINE HYDROCHLORIDE 20 MG/ML
INJECTION, SOLUTION INFILTRATION; PERINEURAL AS NEEDED
Status: DISCONTINUED | OUTPATIENT
Start: 2019-02-20 | End: 2019-02-20 | Stop reason: SURG

## 2019-02-20 RX ORDER — PROPOFOL 10 MG/ML
VIAL (ML) INTRAVENOUS CONTINUOUS PRN
Status: DISCONTINUED | OUTPATIENT
Start: 2019-02-20 | End: 2019-02-20 | Stop reason: SURG

## 2019-02-20 RX ORDER — DIPHENHYDRAMINE HCL 25 MG
50 CAPSULE ORAL ONCE
Status: DISCONTINUED | OUTPATIENT
Start: 2019-02-20 | End: 2019-02-20 | Stop reason: HOSPADM

## 2019-02-20 RX ADMIN — PROPOFOL 125 MCG/KG/MIN: 10 INJECTION, EMULSION INTRAVENOUS at 11:25

## 2019-02-20 RX ADMIN — PROPOFOL 75 MG: 10 INJECTION, EMULSION INTRAVENOUS at 11:25

## 2019-02-20 RX ADMIN — PROPOFOL 50 MG: 10 INJECTION, EMULSION INTRAVENOUS at 11:30

## 2019-02-20 RX ADMIN — SODIUM CHLORIDE, POTASSIUM CHLORIDE, SODIUM LACTATE AND CALCIUM CHLORIDE 1000 ML: 600; 310; 30; 20 INJECTION, SOLUTION INTRAVENOUS at 11:08

## 2019-02-20 RX ADMIN — LIDOCAINE HYDROCHLORIDE 50 MG: 20 INJECTION, SOLUTION INFILTRATION; PERINEURAL at 11:22

## 2019-02-20 NOTE — BRIEF OP NOTE
COLONOSCOPY, ESOPHAGOGASTRODUODENOSCOPY  Progress Note    Jamil Kamara  2/20/2019    Pre-op Diagnosis:   Hematochezia [K92.1]  Early satiety [R68.81]  Dyspepsia and disorder of function of stomach [K31.9, R10.13]       Post-Op Diagnosis Codes:     * Hematochezia [K92.1]     * Early satiety [R68.81]     * Dyspepsia and disorder of function of stomach [K31.9, R10.13]     * Gastritis [K29.70]     * Colon polyps [K63.5]     * Colonic mass [K63.9]    Procedure/CPT® Codes:      Procedure(s):  COLONOSCOPY with Hot and Cold Polypectomy and Biopsies  ESOPHAGOGASTRODUODENOSCOPY with Biopsies    Surgeon(s):  Nino Murphy MD    Anesthesia: Monitor Anesthesia Care    Staff:   Endo Technician: Lexi Gutiérrez  Endo Nurse: Shania Reyes RN    Estimated Blood Loss: minimal    Urine Voided: * No values recorded between 2/20/2019 11:20 AM and 2/20/2019 12:01 PM *    Specimens:                ID Type Source Tests Collected by Time   A : Descending Colon Polyp Polyp Large Intestine, Left / Descending Colon TISSUE PATHOLOGY EXAM Nino Murphy MD 2/20/2019 1140   B : Sigmoid Colon Polyp Polyp Large Intestine, Sigmoid Colon TISSUE PATHOLOGY EXAM Nino Murphy MD 2/20/2019 1144   C : Rectal Mass Tissue Large Intestine, Rectum TISSUE PATHOLOGY EXAM Nino Murphy MD 2/20/2019 1148   D : Antrum  Biopsies Tissue Stomach TISSUE PATHOLOGY EXAM Nino Murphy MD 2/20/2019 1201         Drains:      Findings: Colonoscopy to terminal ileum with normal mucosa.  Polyps of the descending and sigmoid removed with snare.:  Mass in the proximal rectum biopsies were obtained.  EGD with mild antral gastritis biopsies obtained the remainder of the EGD was normal.    Complications: None      Nino Murphy MD     Date: 2/20/2019  Time: 12:01 PM

## 2019-02-20 NOTE — ANESTHESIA POSTPROCEDURE EVALUATION
Patient: Jamil Kamara    Procedure Summary     Date:  02/20/19 Room / Location:  [facility] ENDOSCOPY 8 / [facility] ENDOSCOPY    Anesthesia Start:  1120 Anesthesia Stop:  1207    Procedures:       COLONOSCOPY with Hot and Cold Polypectomy and Biopsies (N/A )      ESOPHAGOGASTRODUODENOSCOPY with Biopsies (N/A Esophagus) Diagnosis:       Hematochezia      Early satiety      Dyspepsia and disorder of function of stomach      Gastritis      Colon polyps      Colonic mass      (Hematochezia [K92.1])      (Early satiety [R68.81])      (Dyspepsia and disorder of function of stomach [K31.9, R10.13])    Surgeon:  Nino Murphy MD Provider:  Luis Cheung MD    Anesthesia Type:  MAC ASA Status:  2          Anesthesia Type: MAC  Last vitals  BP   123/52 (02/20/19 1223)   Temp   36.3 °C (97.4 °F) (02/20/19 1106)   Pulse   54 (02/20/19 1223)   Resp   17 (02/20/19 1223)     SpO2   99 % (02/20/19 1223)     Post Anesthesia Care and Evaluation    Patient location during evaluation: PACU  Patient participation: complete - patient participated  Level of consciousness: awake  Pain score: 1  Pain management: adequate  Airway patency: patent  Anesthetic complications: No anesthetic complications  PONV Status: none  Cardiovascular status: acceptable  Respiratory status: acceptable  Hydration status: acceptable

## 2019-02-20 NOTE — ANESTHESIA PREPROCEDURE EVALUATION
Anesthesia Evaluation     Patient summary reviewed                Airway   No difficulty expected  Dental      Pulmonary    Cardiovascular     Rhythm: regular        Neuro/Psych  GI/Hepatic/Renal/Endo    (+)  GI bleeding,     Musculoskeletal     Abdominal    Substance History      OB/GYN          Other                        Anesthesia Plan    ASA 2     MAC     Anesthetic plan, all risks, benefits, and alternatives have been provided, discussed and informed consent has been obtained with: patient.

## 2019-02-21 ENCOUNTER — HOSPITAL ENCOUNTER (OUTPATIENT)
Dept: MRI IMAGING | Facility: HOSPITAL | Age: 49
Discharge: HOME | End: 2019-02-21
Admitting: SURGERY

## 2019-02-21 ENCOUNTER — TELEPHONE (OUTPATIENT)
Dept: GASTROENTEROLOGY | Facility: CLINIC | Age: 49
End: 2019-02-21

## 2019-02-21 DIAGNOSIS — C20 RECTAL CANCER (HCC): ICD-10-CM

## 2019-02-21 DIAGNOSIS — E87.5 HYPERKALEMIA: Primary | ICD-10-CM

## 2019-02-21 LAB
CYTO UR: NORMAL
LAB AP CASE REPORT: NORMAL
PATH REPORT.FINAL DX SPEC: NORMAL
PATH REPORT.GROSS SPEC: NORMAL

## 2019-02-21 PROCEDURE — 72195 MRI PELVIS W/O DYE: CPT

## 2019-02-21 NOTE — TELEPHONE ENCOUNTER
----- Message from Iker Hugo sent at 2/21/2019 10:38 AM EST -----  Regarding: RX   Contact: [phone]  Pt scope was yesterday and waiting on prednisone to be sent over..   [facility] [phone]

## 2019-02-23 ENCOUNTER — HOSPITAL ENCOUNTER (OUTPATIENT)
Dept: CT IMAGING | Facility: HOSPITAL | Age: 49
Discharge: HOME | End: 2019-02-23

## 2019-02-23 DIAGNOSIS — K63.89 COLONIC MASS: ICD-10-CM

## 2019-02-23 DIAGNOSIS — K62.89 RECTAL MASS: Primary | ICD-10-CM

## 2019-02-23 RX ORDER — PREDNISONE 50 MG/1
50 TABLET ORAL DAILY
Qty: 3 TABLET | Refills: 0 | Status: SHIPPED | OUTPATIENT
Start: 2019-02-23 | End: 2019-02-26

## 2019-02-23 RX ORDER — DIPHENHYDRAMINE HCL 25 MG
50 CAPSULE ORAL ONCE
Status: DISCONTINUED | OUTPATIENT
Start: 2019-02-23 | End: 2019-12-16

## 2019-02-25 ENCOUNTER — LAB (OUTPATIENT)
Dept: FAMILY MEDICINE CLINIC | Facility: CLINIC | Age: 49
End: 2019-02-25

## 2019-02-25 DIAGNOSIS — E87.5 HYPERKALEMIA: ICD-10-CM

## 2019-02-25 LAB
ANION GAP SERPL CALCULATED.3IONS-SCNC: 7.6 MMOL/L
BUN BLD-MCNC: 13 MG/DL (ref 6–20)
BUN/CREAT SERPL: 14.3 (ref 7–25)
CALCIUM SPEC-SCNC: 9.7 MG/DL (ref 8.6–10.5)
CHLORIDE SERPL-SCNC: 103 MMOL/L (ref 98–107)
CO2 SERPL-SCNC: 27.4 MMOL/L (ref 22–29)
CREAT BLD-MCNC: 0.91 MG/DL (ref 0.76–1.27)
GFR SERPL CREATININE-BSD FRML MDRD: 89 ML/MIN/1.73
GLUCOSE BLD-MCNC: 101 MG/DL (ref 65–99)
POTASSIUM BLD-SCNC: 4.7 MMOL/L (ref 3.5–5.2)
SODIUM BLD-SCNC: 138 MMOL/L (ref 136–145)

## 2019-02-25 PROCEDURE — 80048 BASIC METABOLIC PNL TOTAL CA: CPT | Performed by: FAMILY MEDICINE

## 2019-02-25 PROCEDURE — 36415 COLL VENOUS BLD VENIPUNCTURE: CPT | Performed by: FAMILY MEDICINE

## 2019-02-27 ENCOUNTER — OFFICE VISIT (OUTPATIENT)
Dept: SURGERY | Facility: CLINIC | Age: 49
End: 2019-02-27

## 2019-02-27 VITALS — OXYGEN SATURATION: 98 % | WEIGHT: 198 LBS | BODY MASS INDEX: 28.35 KG/M2 | HEART RATE: 64 BPM | HEIGHT: 70 IN

## 2019-02-27 DIAGNOSIS — C20 RECTAL CANCER (HCC): Primary | ICD-10-CM

## 2019-02-27 PROCEDURE — 99244 OFF/OP CNSLTJ NEW/EST MOD 40: CPT | Performed by: SURGERY

## 2019-02-27 RX ORDER — CEFAZOLIN SODIUM 2 G/100ML
2 INJECTION, SOLUTION INTRAVENOUS ONCE
Status: CANCELLED | OUTPATIENT
Start: 2019-03-01

## 2019-02-27 NOTE — PROGRESS NOTES
SUMMARY (A/P):    48-year-old gentleman with newly diagnosed rectal cancer.  The lesion by endoscopy and MRI appears to be in the high rectum and CT scan does not show locally advanced disease nor evidence of metastasis.  I recommended proceeding with laparoscopic low anterior resection.  He and his family understand the rationale for the procedure, the nature of the procedure, and the risks including but not limited to bleeding, infection, conversion to open procedure, and anastomotic leak requiring reoperation and temporary ostomy.  He is scheduled for this Friday.      CC:    Rectal cancer, referred for consultation by Dr. Murphy    HPI:    48-year-old gentleman presents with 3-4-month history of generalized moderate crampy abdominal pain that is intermittent and associated with bright red blood per rectum which has worsened over the last several months.  He underwent colonoscopy by Dr. Murphy for further workup and was found to have a rectal cancer in the high rectum.    PSH:    -Colonoscopy 4/23/2015 demonstrated tubular adenoma in the rectum, performed by Dr. Vanessa  -Open right inguinal hernia repair  -Appendectomy    PMH:    -History of adenomatous polyps    FAMILY HISTORY:    Father with colon cancer in his mid 50s    SOCIAL HISTORY:   Works at [employer], job does involve occasional heavy lifting  Denies tobacco use  Occasional alcohol use    ALLERGIES: reviewed, in Epic    MEDICATIONS: reviewed, in Epic    ROS:  No chest pain or shortness of air.  All other systems reviewed and negative other than presenting complaints.    RADIOLOGY/ENDOSCOPY:    -CT abdomen pelvis 2/7/2019 demonstrated extensive fatty liver and no other acute abnormality.  I reviewed the images and it is not apparent as to the location of the rectal tumor although I suspect there is abnormality present in the high rectum.  Certainly there is no evidence of locally advanced disease.  -MRI of the pelvis demonstrates no discrete mass within the  extraperitoneal portion of the rectum.  Irregular thickening of the distal sigmoid at the level of the peritoneal reflection is visualized.  On my review of the images I would refer to this more as a high rectal lesion and not distal sigmoid.  -Colonoscopy 2/20/2019 demonstrated a polyp in the descending colon and a polyp in the sigmoid colon, both of which were removed (pathology tubular adenomas).  Fungating nonobstructing mass was found in the proximal rectum.  Pathology: Invasive moderately differentiated adenocarcinoma.    LABS:    CEA 4.97  CMP basically normal except potassium of 5.6 which was subsequently normal on follow-up check  CBC normal    PHYSICAL EXAM:   Constitutional: Well-developed well-nourished, no acute distress  Vital signs:  HR 64  Weight 198 pounds  Height 70 inches  BMI 28.4  Eyes: Conjunctiva normal, sclera nonicteric  ENMT: Hearing grossly normal, oral mucosa moist  Neck: Supple, no palpable mass, normal thyroid, trachea midline  Respiratory: Clear to auscultation, normal inspiratory effort  Cardiovascular: Regular rate, no murmur, no carotid bruit, no peripheral edema, no jugular venous distention  Gastrointestinal: Soft, nontender, no palpable mass, no hepatosplenomegaly, negative for hernia, bowel sounds normal  Lymphatics (palpable nodes):  cervical-negative, axillary-negative  Skin:  Warm, dry, no rash on visualized skin surfaces  Musculoskeletal: Symmetric strength, normal gait  Psychiatric: Alert and oriented ×3, normal affect     CK WISEMAN M.D.

## 2019-03-01 ENCOUNTER — ANESTHESIA EVENT (OUTPATIENT)
Dept: PERIOP | Facility: HOSPITAL | Age: 49
End: 2019-03-01

## 2019-03-01 ENCOUNTER — ANESTHESIA (OUTPATIENT)
Dept: PERIOP | Facility: HOSPITAL | Age: 49
End: 2019-03-01

## 2019-03-01 ENCOUNTER — HOSPITAL ENCOUNTER (INPATIENT)
Facility: HOSPITAL | Age: 49
LOS: 3 days | Discharge: HOME | End: 2019-03-04
Attending: SURGERY | Admitting: SURGERY

## 2019-03-01 DIAGNOSIS — C20 RECTAL CANCER (HCC): ICD-10-CM

## 2019-03-01 PROCEDURE — 44207 L COLECTOMY/COLOPROCTOSTOMY: CPT | Performed by: SURGERY

## 2019-03-01 PROCEDURE — 25010000002 HYDROMORPHONE PER 4 MG: Performed by: NURSE ANESTHETIST, CERTIFIED REGISTERED

## 2019-03-01 PROCEDURE — 25010000002 HYDROMORPHONE PER 4 MG: Performed by: SURGERY

## 2019-03-01 PROCEDURE — 25010000003 CEFAZOLIN IN DEXTROSE 2-4 GM/100ML-% SOLUTION: Performed by: SURGERY

## 2019-03-01 PROCEDURE — 25010000002 MIDAZOLAM PER 1 MG: Performed by: ANESTHESIOLOGY

## 2019-03-01 PROCEDURE — 88309 TISSUE EXAM BY PATHOLOGIST: CPT | Performed by: SURGERY

## 2019-03-01 PROCEDURE — 25010000002 PROPOFOL 10 MG/ML EMULSION: Performed by: NURSE ANESTHETIST, CERTIFIED REGISTERED

## 2019-03-01 PROCEDURE — 25010000002 FENTANYL CITRATE (PF) 100 MCG/2ML SOLUTION: Performed by: NURSE ANESTHETIST, CERTIFIED REGISTERED

## 2019-03-01 PROCEDURE — 0DBP4ZZ EXCISION OF RECTUM, PERCUTANEOUS ENDOSCOPIC APPROACH: ICD-10-PCS | Performed by: SURGERY

## 2019-03-01 PROCEDURE — 0DBN4ZZ EXCISION OF SIGMOID COLON, PERCUTANEOUS ENDOSCOPIC APPROACH: ICD-10-PCS | Performed by: SURGERY

## 2019-03-01 PROCEDURE — 25010000002 ONDANSETRON PER 1 MG: Performed by: SURGERY

## 2019-03-01 PROCEDURE — 44207 L COLECTOMY/COLOPROCTOSTOMY: CPT | Performed by: SPECIALIST/TECHNOLOGIST, OTHER

## 2019-03-01 PROCEDURE — 0DJD8ZZ INSPECTION OF LOWER INTESTINAL TRACT, VIA NATURAL OR ARTIFICIAL OPENING ENDOSCOPIC: ICD-10-PCS | Performed by: SURGERY

## 2019-03-01 DEVICE — CLIP LIGAT VASC HORIZON TI LG ORNG 6CT: Type: IMPLANTABLE DEVICE | Status: FUNCTIONAL

## 2019-03-01 DEVICE — CLIP LIGAT VASC HORIZON TI MD/LG GRN 6CT: Type: IMPLANTABLE DEVICE | Status: FUNCTIONAL

## 2019-03-01 DEVICE — ENDOPATH ECHELON ENDOSCOPIC LINEAR CUTTER RELOADS, BLUE, 60MM
Type: IMPLANTABLE DEVICE | Status: FUNCTIONAL
Brand: ECHELON ENDOPATH

## 2019-03-01 DEVICE — ENDOSCOPIC LINEAR CUTTER RELOADS GRAY 2.0MM, 6 ROWS
Type: IMPLANTABLE DEVICE | Status: FUNCTIONAL
Brand: ECHELON ENDOPATH

## 2019-03-01 RX ORDER — HYDROMORPHONE HCL 110MG/55ML
PATIENT CONTROLLED ANALGESIA SYRINGE INTRAVENOUS AS NEEDED
Status: DISCONTINUED | OUTPATIENT
Start: 2019-03-01 | End: 2019-03-01 | Stop reason: SURG

## 2019-03-01 RX ORDER — PROMETHAZINE HYDROCHLORIDE 25 MG/ML
12.5 INJECTION, SOLUTION INTRAMUSCULAR; INTRAVENOUS ONCE AS NEEDED
Status: DISCONTINUED | OUTPATIENT
Start: 2019-03-01 | End: 2019-03-01 | Stop reason: HOSPADM

## 2019-03-01 RX ORDER — ALVIMOPAN 12 MG/1
12 CAPSULE ORAL 2 TIMES DAILY
Status: DISCONTINUED | OUTPATIENT
Start: 2019-03-01 | End: 2019-03-01

## 2019-03-01 RX ORDER — SODIUM CHLORIDE 9 MG/ML
75 INJECTION, SOLUTION INTRAVENOUS CONTINUOUS
Status: DISCONTINUED | OUTPATIENT
Start: 2019-03-01 | End: 2019-03-04 | Stop reason: HOSPADM

## 2019-03-01 RX ORDER — BUPIVACAINE HYDROCHLORIDE AND EPINEPHRINE 5; 5 MG/ML; UG/ML
INJECTION, SOLUTION PERINEURAL AS NEEDED
Status: DISCONTINUED | OUTPATIENT
Start: 2019-03-01 | End: 2019-03-01 | Stop reason: HOSPADM

## 2019-03-01 RX ORDER — ONDANSETRON 2 MG/ML
4 INJECTION INTRAMUSCULAR; INTRAVENOUS EVERY 4 HOURS PRN
Status: DISCONTINUED | OUTPATIENT
Start: 2019-03-01 | End: 2019-03-04 | Stop reason: HOSPADM

## 2019-03-01 RX ORDER — LIDOCAINE HYDROCHLORIDE 10 MG/ML
0.5 INJECTION, SOLUTION EPIDURAL; INFILTRATION; INTRACAUDAL; PERINEURAL ONCE AS NEEDED
Status: DISCONTINUED | OUTPATIENT
Start: 2019-03-01 | End: 2019-03-01 | Stop reason: HOSPADM

## 2019-03-01 RX ORDER — ROCURONIUM BROMIDE 10 MG/ML
INJECTION, SOLUTION INTRAVENOUS AS NEEDED
Status: DISCONTINUED | OUTPATIENT
Start: 2019-03-01 | End: 2019-03-01 | Stop reason: SURG

## 2019-03-01 RX ORDER — HYDROCODONE BITARTRATE AND ACETAMINOPHEN 5; 325 MG/1; MG/1
2 TABLET ORAL EVERY 4 HOURS PRN
Status: DISCONTINUED | OUTPATIENT
Start: 2019-03-01 | End: 2019-03-01

## 2019-03-01 RX ORDER — NALOXONE HCL 0.4 MG/ML
0.1 VIAL (ML) INJECTION
Status: DISCONTINUED | OUTPATIENT
Start: 2019-03-01 | End: 2019-03-04 | Stop reason: HOSPADM

## 2019-03-01 RX ORDER — FAMOTIDINE 10 MG/ML
20 INJECTION, SOLUTION INTRAVENOUS ONCE
Status: COMPLETED | OUTPATIENT
Start: 2019-03-01 | End: 2019-03-01

## 2019-03-01 RX ORDER — MAGNESIUM HYDROXIDE 1200 MG/15ML
LIQUID ORAL AS NEEDED
Status: DISCONTINUED | OUTPATIENT
Start: 2019-03-01 | End: 2019-03-01 | Stop reason: HOSPADM

## 2019-03-01 RX ORDER — PANTOPRAZOLE SODIUM 40 MG/1
40 TABLET, DELAYED RELEASE ORAL
Status: DISCONTINUED | OUTPATIENT
Start: 2019-03-01 | End: 2019-03-04 | Stop reason: HOSPADM

## 2019-03-01 RX ORDER — ONDANSETRON 2 MG/ML
4 INJECTION INTRAMUSCULAR; INTRAVENOUS ONCE AS NEEDED
Status: DISCONTINUED | OUTPATIENT
Start: 2019-03-01 | End: 2019-03-01 | Stop reason: HOSPADM

## 2019-03-01 RX ORDER — MECLIZINE HYDROCHLORIDE 25 MG/1
25 TABLET ORAL 3 TIMES DAILY PRN
Status: DISCONTINUED | OUTPATIENT
Start: 2019-03-01 | End: 2019-03-04 | Stop reason: HOSPADM

## 2019-03-01 RX ORDER — MIDAZOLAM HYDROCHLORIDE 1 MG/ML
1 INJECTION INTRAMUSCULAR; INTRAVENOUS
Status: DISCONTINUED | OUTPATIENT
Start: 2019-03-01 | End: 2019-03-01 | Stop reason: HOSPADM

## 2019-03-01 RX ORDER — CEFAZOLIN SODIUM 2 G/100ML
2 INJECTION, SOLUTION INTRAVENOUS ONCE
Status: COMPLETED | OUTPATIENT
Start: 2019-03-01 | End: 2019-03-01

## 2019-03-01 RX ORDER — DIPHENHYDRAMINE HCL 25 MG
25 CAPSULE ORAL
Status: DISCONTINUED | OUTPATIENT
Start: 2019-03-01 | End: 2019-03-01 | Stop reason: HOSPADM

## 2019-03-01 RX ORDER — SODIUM CHLORIDE, SODIUM LACTATE, POTASSIUM CHLORIDE, CALCIUM CHLORIDE 600; 310; 30; 20 MG/100ML; MG/100ML; MG/100ML; MG/100ML
9 INJECTION, SOLUTION INTRAVENOUS CONTINUOUS
Status: DISCONTINUED | OUTPATIENT
Start: 2019-03-01 | End: 2019-03-02

## 2019-03-01 RX ORDER — HYDROCODONE BITARTRATE AND ACETAMINOPHEN 7.5; 325 MG/1; MG/1
1 TABLET ORAL ONCE AS NEEDED
Status: DISCONTINUED | OUTPATIENT
Start: 2019-03-01 | End: 2019-03-01 | Stop reason: HOSPADM

## 2019-03-01 RX ORDER — SODIUM CHLORIDE 0.9 % (FLUSH) 0.9 %
1-10 SYRINGE (ML) INJECTION AS NEEDED
Status: DISCONTINUED | OUTPATIENT
Start: 2019-03-01 | End: 2019-03-01 | Stop reason: HOSPADM

## 2019-03-01 RX ORDER — MIDAZOLAM HYDROCHLORIDE 1 MG/ML
2 INJECTION INTRAMUSCULAR; INTRAVENOUS
Status: DISCONTINUED | OUTPATIENT
Start: 2019-03-01 | End: 2019-03-01 | Stop reason: HOSPADM

## 2019-03-01 RX ORDER — OXYCODONE AND ACETAMINOPHEN 7.5; 325 MG/1; MG/1
1 TABLET ORAL ONCE AS NEEDED
Status: DISCONTINUED | OUTPATIENT
Start: 2019-03-01 | End: 2019-03-01 | Stop reason: HOSPADM

## 2019-03-01 RX ORDER — FENTANYL CITRATE 50 UG/ML
INJECTION, SOLUTION INTRAMUSCULAR; INTRAVENOUS AS NEEDED
Status: DISCONTINUED | OUTPATIENT
Start: 2019-03-01 | End: 2019-03-01 | Stop reason: SURG

## 2019-03-01 RX ORDER — PROPOFOL 10 MG/ML
VIAL (ML) INTRAVENOUS AS NEEDED
Status: DISCONTINUED | OUTPATIENT
Start: 2019-03-01 | End: 2019-03-01 | Stop reason: SURG

## 2019-03-01 RX ORDER — EPHEDRINE SULFATE 50 MG/ML
5 INJECTION, SOLUTION INTRAVENOUS ONCE AS NEEDED
Status: DISCONTINUED | OUTPATIENT
Start: 2019-03-01 | End: 2019-03-01 | Stop reason: HOSPADM

## 2019-03-01 RX ORDER — FENTANYL CITRATE 50 UG/ML
50 INJECTION, SOLUTION INTRAMUSCULAR; INTRAVENOUS
Status: DISCONTINUED | OUTPATIENT
Start: 2019-03-01 | End: 2019-03-01 | Stop reason: HOSPADM

## 2019-03-01 RX ORDER — DIPHENHYDRAMINE HYDROCHLORIDE 50 MG/ML
12.5 INJECTION INTRAMUSCULAR; INTRAVENOUS
Status: DISCONTINUED | OUTPATIENT
Start: 2019-03-01 | End: 2019-03-01 | Stop reason: HOSPADM

## 2019-03-01 RX ORDER — FLUMAZENIL 0.1 MG/ML
0.2 INJECTION INTRAVENOUS AS NEEDED
Status: DISCONTINUED | OUTPATIENT
Start: 2019-03-01 | End: 2019-03-01 | Stop reason: HOSPADM

## 2019-03-01 RX ORDER — PROMETHAZINE HYDROCHLORIDE 25 MG/1
25 SUPPOSITORY RECTAL ONCE AS NEEDED
Status: DISCONTINUED | OUTPATIENT
Start: 2019-03-01 | End: 2019-03-01 | Stop reason: HOSPADM

## 2019-03-01 RX ORDER — HYDROCODONE BITARTRATE AND ACETAMINOPHEN 5; 325 MG/1; MG/1
1 TABLET ORAL EVERY 4 HOURS PRN
Status: DISCONTINUED | OUTPATIENT
Start: 2019-03-01 | End: 2019-03-04 | Stop reason: HOSPADM

## 2019-03-01 RX ORDER — HYDROCODONE BITARTRATE AND ACETAMINOPHEN 5; 325 MG/1; MG/1
2 TABLET ORAL EVERY 4 HOURS PRN
Status: DISCONTINUED | OUTPATIENT
Start: 2019-03-01 | End: 2019-03-04 | Stop reason: HOSPADM

## 2019-03-01 RX ORDER — SODIUM CHLORIDE 9 MG/ML
INJECTION, SOLUTION INTRAVENOUS AS NEEDED
Status: DISCONTINUED | OUTPATIENT
Start: 2019-03-01 | End: 2019-03-01 | Stop reason: HOSPADM

## 2019-03-01 RX ORDER — HYDRALAZINE HYDROCHLORIDE 20 MG/ML
5 INJECTION INTRAMUSCULAR; INTRAVENOUS
Status: DISCONTINUED | OUTPATIENT
Start: 2019-03-01 | End: 2019-03-01 | Stop reason: HOSPADM

## 2019-03-01 RX ORDER — ACETAMINOPHEN 325 MG/1
650 TABLET ORAL ONCE AS NEEDED
Status: DISCONTINUED | OUTPATIENT
Start: 2019-03-01 | End: 2019-03-01 | Stop reason: HOSPADM

## 2019-03-01 RX ORDER — NALOXONE HCL 0.4 MG/ML
0.2 VIAL (ML) INJECTION AS NEEDED
Status: DISCONTINUED | OUTPATIENT
Start: 2019-03-01 | End: 2019-03-01 | Stop reason: HOSPADM

## 2019-03-01 RX ORDER — LABETALOL HYDROCHLORIDE 5 MG/ML
5 INJECTION, SOLUTION INTRAVENOUS
Status: DISCONTINUED | OUTPATIENT
Start: 2019-03-01 | End: 2019-03-01 | Stop reason: HOSPADM

## 2019-03-01 RX ORDER — PROMETHAZINE HYDROCHLORIDE 25 MG/1
25 TABLET ORAL ONCE AS NEEDED
Status: DISCONTINUED | OUTPATIENT
Start: 2019-03-01 | End: 2019-03-01 | Stop reason: HOSPADM

## 2019-03-01 RX ORDER — HYDROMORPHONE HYDROCHLORIDE 1 MG/ML
0.5 INJECTION, SOLUTION INTRAMUSCULAR; INTRAVENOUS; SUBCUTANEOUS
Status: DISCONTINUED | OUTPATIENT
Start: 2019-03-01 | End: 2019-03-04 | Stop reason: HOSPADM

## 2019-03-01 RX ORDER — CEFAZOLIN SODIUM 2 G/100ML
2 INJECTION, SOLUTION INTRAVENOUS EVERY 8 HOURS
Status: COMPLETED | OUTPATIENT
Start: 2019-03-01 | End: 2019-03-02

## 2019-03-01 RX ORDER — FENTANYL CITRATE 50 UG/ML
50 INJECTION, SOLUTION INTRAMUSCULAR; INTRAVENOUS
Status: COMPLETED | OUTPATIENT
Start: 2019-03-01 | End: 2019-03-01

## 2019-03-01 RX ORDER — LIDOCAINE HYDROCHLORIDE 20 MG/ML
INJECTION, SOLUTION INFILTRATION; PERINEURAL AS NEEDED
Status: DISCONTINUED | OUTPATIENT
Start: 2019-03-01 | End: 2019-03-01 | Stop reason: SURG

## 2019-03-01 RX ORDER — HYDROMORPHONE HYDROCHLORIDE 1 MG/ML
0.5 INJECTION, SOLUTION INTRAMUSCULAR; INTRAVENOUS; SUBCUTANEOUS
Status: DISCONTINUED | OUTPATIENT
Start: 2019-03-01 | End: 2019-03-01 | Stop reason: HOSPADM

## 2019-03-01 RX ADMIN — HYDROMORPHONE HYDROCHLORIDE 0.5 MG: 1 INJECTION, SOLUTION INTRAMUSCULAR; INTRAVENOUS; SUBCUTANEOUS at 15:10

## 2019-03-01 RX ADMIN — ROCURONIUM BROMIDE 10 MG: 10 INJECTION INTRAVENOUS at 14:17

## 2019-03-01 RX ADMIN — CEFAZOLIN SODIUM 2 G: 2 INJECTION, SOLUTION INTRAVENOUS at 20:47

## 2019-03-01 RX ADMIN — FENTANYL CITRATE 150 MCG: 50 INJECTION, SOLUTION INTRAMUSCULAR; INTRAVENOUS at 12:41

## 2019-03-01 RX ADMIN — ONDANSETRON HYDROCHLORIDE 4 MG: 2 SOLUTION INTRAMUSCULAR; INTRAVENOUS at 17:19

## 2019-03-01 RX ADMIN — METRONIDAZOLE 500 MG: 500 INJECTION, SOLUTION INTRAVENOUS at 19:25

## 2019-03-01 RX ADMIN — PROPOFOL 200 MG: 10 INJECTION, EMULSION INTRAVENOUS at 12:41

## 2019-03-01 RX ADMIN — FENTANYL CITRATE 50 MCG: 50 INJECTION, SOLUTION INTRAMUSCULAR; INTRAVENOUS at 13:18

## 2019-03-01 RX ADMIN — ONDANSETRON HYDROCHLORIDE 4 MG: 2 SOLUTION INTRAMUSCULAR; INTRAVENOUS at 20:54

## 2019-03-01 RX ADMIN — FAMOTIDINE 20 MG: 10 INJECTION INTRAVENOUS at 12:20

## 2019-03-01 RX ADMIN — MIDAZOLAM 2 MG: 1 INJECTION INTRAMUSCULAR; INTRAVENOUS at 12:21

## 2019-03-01 RX ADMIN — HYDROMORPHONE HYDROCHLORIDE 0.5 MG: 1 INJECTION, SOLUTION INTRAMUSCULAR; INTRAVENOUS; SUBCUTANEOUS at 20:48

## 2019-03-01 RX ADMIN — SODIUM CHLORIDE, POTASSIUM CHLORIDE, SODIUM LACTATE AND CALCIUM CHLORIDE: 600; 310; 30; 20 INJECTION, SOLUTION INTRAVENOUS at 14:32

## 2019-03-01 RX ADMIN — FENTANYL CITRATE 50 MCG: 50 INJECTION, SOLUTION INTRAMUSCULAR; INTRAVENOUS at 15:09

## 2019-03-01 RX ADMIN — HYDROMORPHONE HYDROCHLORIDE 0.5 MG: 1 INJECTION, SOLUTION INTRAMUSCULAR; INTRAVENOUS; SUBCUTANEOUS at 16:05

## 2019-03-01 RX ADMIN — ALVIMOPAN 12 MG: 12 CAPSULE ORAL at 12:02

## 2019-03-01 RX ADMIN — ROCURONIUM BROMIDE 10 MG: 10 INJECTION INTRAVENOUS at 13:57

## 2019-03-01 RX ADMIN — HYDROMORPHONE HYDROCHLORIDE 0.5 MG: 2 INJECTION INTRAMUSCULAR; INTRAVENOUS; SUBCUTANEOUS at 14:13

## 2019-03-01 RX ADMIN — FENTANYL CITRATE 50 MCG: 50 INJECTION, SOLUTION INTRAMUSCULAR; INTRAVENOUS at 15:45

## 2019-03-01 RX ADMIN — LIDOCAINE HYDROCHLORIDE 100 MG: 20 INJECTION, SOLUTION INFILTRATION; PERINEURAL at 12:41

## 2019-03-01 RX ADMIN — ROCURONIUM BROMIDE 50 MG: 10 INJECTION INTRAVENOUS at 12:41

## 2019-03-01 RX ADMIN — FENTANYL CITRATE 50 MCG: 50 INJECTION, SOLUTION INTRAMUSCULAR; INTRAVENOUS at 16:14

## 2019-03-01 RX ADMIN — PANTOPRAZOLE SODIUM 40 MG: 40 TABLET, DELAYED RELEASE ORAL at 20:48

## 2019-03-01 RX ADMIN — SODIUM CHLORIDE, POTASSIUM CHLORIDE, SODIUM LACTATE AND CALCIUM CHLORIDE: 600; 310; 30; 20 INJECTION, SOLUTION INTRAVENOUS at 13:53

## 2019-03-01 RX ADMIN — SUGAMMADEX 200 MG: 100 INJECTION, SOLUTION INTRAVENOUS at 14:36

## 2019-03-01 RX ADMIN — FENTANYL CITRATE 50 MCG: 50 INJECTION, SOLUTION INTRAMUSCULAR; INTRAVENOUS at 13:30

## 2019-03-01 RX ADMIN — CEFAZOLIN SODIUM 2 G: 2 INJECTION, SOLUTION INTRAVENOUS at 12:56

## 2019-03-01 RX ADMIN — HYDROMORPHONE HYDROCHLORIDE 0.5 MG: 2 INJECTION INTRAMUSCULAR; INTRAVENOUS; SUBCUTANEOUS at 14:02

## 2019-03-01 RX ADMIN — SODIUM CHLORIDE, POTASSIUM CHLORIDE, SODIUM LACTATE AND CALCIUM CHLORIDE 9 ML/HR: 600; 310; 30; 20 INJECTION, SOLUTION INTRAVENOUS at 15:56

## 2019-03-01 RX ADMIN — SODIUM CHLORIDE, POTASSIUM CHLORIDE, SODIUM LACTATE AND CALCIUM CHLORIDE 9 ML/HR: 600; 310; 30; 20 INJECTION, SOLUTION INTRAVENOUS at 12:14

## 2019-03-01 RX ADMIN — FENTANYL CITRATE 50 MCG: 50 INJECTION, SOLUTION INTRAMUSCULAR; INTRAVENOUS at 15:23

## 2019-03-01 RX ADMIN — METRONIDAZOLE 500 MG: 500 INJECTION, SOLUTION INTRAVENOUS at 12:02

## 2019-03-01 RX ADMIN — HYDROMORPHONE HYDROCHLORIDE 0.5 MG: 1 INJECTION, SOLUTION INTRAMUSCULAR; INTRAVENOUS; SUBCUTANEOUS at 15:32

## 2019-03-01 NOTE — ANESTHESIA PREPROCEDURE EVALUATION
Anesthesia Evaluation     Patient summary reviewed   NPO Solid Status: > 8 hours  NPO Liquid Status: > 2 hours           Airway   Mallampati: I  TM distance: >3 FB  Neck ROM: full  no difficulty expected  Dental      Pulmonary     breath sounds clear to auscultation  (+) a smoker Current Abstained day of surgery,   (-) shortness of breath  Cardiovascular   Exercise tolerance: good (4-7 METS)    Rhythm: regular  Rate: normal    (-) angina, VALENTIN      Neuro/Psych  (+) headaches, syncope,     GI/Hepatic/Renal/Endo    (+)  GI bleeding, liver disease,     Musculoskeletal     (-) neck stiffness (denies)  Abdominal    Substance History      OB/GYN          Other   (+) arthritis   history of cancer                    Anesthesia Plan    ASA 2     general     intravenous induction   Anesthetic plan, all risks, benefits, and alternatives have been provided, discussed and informed consent has been obtained with: patient and spouse/significant other.

## 2019-03-01 NOTE — PERIOPERATIVE NURSING NOTE
DR. WISEMAN AT BEDSIDE. MADE OUT HIS OWN CONSENT FORM. SIGNED AND WITNESSED BY THIS NURSE. AWARE OF PCN ALLERGY. STATED HE STILL WANTED KEFZOL GIVEN.

## 2019-03-01 NOTE — ANESTHESIA PROCEDURE NOTES
Airway  Urgency: elective    Airway not difficult    General Information and Staff    Patient location during procedure: OR  Anesthesiologist: Zack Hoff MD  CRNA: Herminia Cannon CRNA    Indications and Patient Condition  Indications for airway management: airway protection    Preoxygenated: yes  Mask difficulty assessment: 1 - vent by mask    Final Airway Details  Final airway type: endotracheal airway      Successful airway: ETT  Cuffed: yes   Successful intubation technique: video laryngoscopy  Endotracheal tube insertion site: oral  Blade: CMAC  Blade size: D  ETT size (mm): 8.0  Placement verified by: chest auscultation and capnometry   Measured from: lips  ETT to lips (cm): 24  Number of attempts at approach: 2    Additional Comments  DLx1 with mil 2, airway deep and anterior, good view with CMAC, Atraumatic, MOP to cuff, BSBE, no change to dentition, secured with tape

## 2019-03-01 NOTE — OP NOTE
PREOPERATIVE DIAGNOSIS:  Rectal cancer in high rectum    POSTOPERATIVE DIAGNOSIS (FINDINGS):  Same    PROCEDURE:  1.  Laparoscopic low anterior resection  2.  Flexible sigmoidoscopy    SURGEON:  Matthew Gardner MD    ASSISTANT:  Lyssa Acosta    ANESTHESIA:  General    EBL:  Minimal    SPECIMEN(S):  Distal sigmoid colon and proximal rectum    DESCRIPTION:  In supine position under general anesthetic prepped and draped usual sterile manner.  Half percent Marcaine with epinephrine infiltrated locally.  Small vertical incision made above the umbilicus and Veress needle inserted with upwards traction on the abdominal wall.  Abdomen insufflated to 15 mmHg.  12 mm right lower quadrant trocar inserted, 5 mm right mid abdomen trocar inserted, and 5 mm left lower quadrant trocar inserted.  Sigmoid colon was mobilized along the peritoneal attachments.  The inferior mesenteric pedicle was isolated and divided with the vascular loaded stapler after identifying and avoiding the left ureter.  Dissection of the peritoneum on either side of the rectum was performed with the harmonic scalpel, again avoiding both ureters.  Presacral plane was sharply and bluntly dissected and the peritoneum was opened at the peritoneal reflection.  The rectum was skeletonized at the peritoneal reflection and although I felt grossly the tumor was proximal to that point I could not with certainty save this and performed flexible sigmoidoscopy which did confirm the tumor was well proximal to the dissection at the level of the peritoneal reflection.  The rectum was therefore divided at the level of the peritoneal reflection with the blue loaded stapler.  Left lower quadrant incision was extended in a muscle-splitting manner and small wound protector was inserted and bowel was exteriorized.  The point of transection on the mid sigmoid colon was chosen and the remaining mesentery was divided with the harmonic scalpel.  The colon was divided with  electrocautery and a 29 mm anvil was inserted and pursestring sutured in with 2-0 Prolene.  Bowel was returned to the peritoneal cavity and wound protector was removed.  Fascia was closed in 2 layers of running 0 PDS.  Wound was irrigated with Betadine.  Abdomen was reinsufflated and the stapler was inserted transrectally and the 2 ends were connected and stapler was fired.  There was no tension on the anastomosis.  There was visible arterial flow in the proximal colon.  An airtight anastomosis was confirmed within a saline filled pelvis.  A single 2-0 silk stitch anteriorly was required between the rectum and sigmoid colon to achieve good hemostasis along the staple line.  Good hemostasis was noted and a 19 Icelandic Rodrigo drain was placed.  CO2 was released.  Fascia of the 12 mm trocar site was closed with 0 Vicryl.  Skin edges 5-0 Vicryl subcuticular.  Tolerated well, stable to recovery room.    Matthew Gardner M.D.

## 2019-03-02 LAB
ANION GAP SERPL CALCULATED.3IONS-SCNC: 12.8 MMOL/L
BASOPHILS # BLD AUTO: 0.04 10*3/MM3 (ref 0–0.2)
BASOPHILS NFR BLD AUTO: 0.2 % (ref 0–1.5)
BUN BLD-MCNC: 8 MG/DL (ref 6–20)
BUN/CREAT SERPL: 10.4 (ref 7–25)
CALCIUM SPEC-SCNC: 8.7 MG/DL (ref 8.6–10.5)
CHLORIDE SERPL-SCNC: 100 MMOL/L (ref 98–107)
CO2 SERPL-SCNC: 27.2 MMOL/L (ref 22–29)
CREAT BLD-MCNC: 0.77 MG/DL (ref 0.76–1.27)
DEPRECATED RDW RBC AUTO: 39.8 FL (ref 37–54)
EOSINOPHIL # BLD AUTO: 0 10*3/MM3 (ref 0–0.4)
EOSINOPHIL NFR BLD AUTO: 0 % (ref 0.3–6.2)
ERYTHROCYTE [DISTWIDTH] IN BLOOD BY AUTOMATED COUNT: 11.7 % (ref 12.3–15.4)
GFR SERPL CREATININE-BSD FRML MDRD: 107 ML/MIN/1.73
GLUCOSE BLD-MCNC: 130 MG/DL (ref 65–99)
HCT VFR BLD AUTO: 44.3 % (ref 37.5–51)
HGB BLD-MCNC: 14.8 G/DL (ref 13–17.7)
IMM GRANULOCYTES # BLD AUTO: 0.13 10*3/MM3 (ref 0–0.05)
IMM GRANULOCYTES NFR BLD AUTO: 0.8 % (ref 0–0.5)
LYMPHOCYTES # BLD AUTO: 0.92 10*3/MM3 (ref 0.7–3.1)
LYMPHOCYTES NFR BLD AUTO: 5.7 % (ref 19.6–45.3)
MCH RBC QN AUTO: 30.9 PG (ref 26.6–33)
MCHC RBC AUTO-ENTMCNC: 33.4 G/DL (ref 31.5–35.7)
MCV RBC AUTO: 92.5 FL (ref 79–97)
MONOCYTES # BLD AUTO: 1.45 10*3/MM3 (ref 0.1–0.9)
MONOCYTES NFR BLD AUTO: 9 % (ref 5–12)
NEUTROPHILS # BLD AUTO: 13.62 10*3/MM3 (ref 1.4–7)
NEUTROPHILS NFR BLD AUTO: 84.3 % (ref 42.7–76)
NRBC BLD AUTO-RTO: 0 /100 WBC (ref 0–0)
PLATELET # BLD AUTO: 261 10*3/MM3 (ref 140–450)
PMV BLD AUTO: 10 FL (ref 6–12)
POTASSIUM BLD-SCNC: 4.4 MMOL/L (ref 3.5–5.2)
RBC # BLD AUTO: 4.79 10*6/MM3 (ref 4.14–5.8)
SODIUM BLD-SCNC: 140 MMOL/L (ref 136–145)
WBC NRBC COR # BLD: 16.16 10*3/MM3 (ref 3.4–10.8)

## 2019-03-02 PROCEDURE — 85025 COMPLETE CBC W/AUTO DIFF WBC: CPT | Performed by: SURGERY

## 2019-03-02 PROCEDURE — 94799 UNLISTED PULMONARY SVC/PX: CPT

## 2019-03-02 PROCEDURE — 25010000002 KETOROLAC TROMETHAMINE PER 15 MG: Performed by: SURGERY

## 2019-03-02 PROCEDURE — 25010000002 ONDANSETRON PER 1 MG: Performed by: SURGERY

## 2019-03-02 PROCEDURE — 80048 BASIC METABOLIC PNL TOTAL CA: CPT | Performed by: SURGERY

## 2019-03-02 PROCEDURE — 25010000002 HYDROMORPHONE PER 4 MG: Performed by: SURGERY

## 2019-03-02 PROCEDURE — 25010000002 METOCLOPRAMIDE PER 10 MG: Performed by: SURGERY

## 2019-03-02 PROCEDURE — 25010000003 CEFAZOLIN IN DEXTROSE 2-4 GM/100ML-% SOLUTION: Performed by: SURGERY

## 2019-03-02 PROCEDURE — 25010000002 ENOXAPARIN PER 10 MG: Performed by: SURGERY

## 2019-03-02 RX ORDER — PROMETHAZINE HYDROCHLORIDE 25 MG/ML
12.5 INJECTION, SOLUTION INTRAMUSCULAR; INTRAVENOUS EVERY 6 HOURS PRN
Status: DISCONTINUED | OUTPATIENT
Start: 2019-03-02 | End: 2019-03-04 | Stop reason: HOSPADM

## 2019-03-02 RX ORDER — KETOROLAC TROMETHAMINE 30 MG/ML
15 INJECTION, SOLUTION INTRAMUSCULAR; INTRAVENOUS EVERY 6 HOURS
Status: COMPLETED | OUTPATIENT
Start: 2019-03-02 | End: 2019-03-04

## 2019-03-02 RX ORDER — METOCLOPRAMIDE HYDROCHLORIDE 5 MG/ML
10 INJECTION INTRAMUSCULAR; INTRAVENOUS EVERY 6 HOURS
Status: COMPLETED | OUTPATIENT
Start: 2019-03-02 | End: 2019-03-04

## 2019-03-02 RX ADMIN — ONDANSETRON HYDROCHLORIDE 4 MG: 2 SOLUTION INTRAMUSCULAR; INTRAVENOUS at 09:33

## 2019-03-02 RX ADMIN — KETOROLAC TROMETHAMINE 15 MG: 30 INJECTION, SOLUTION INTRAMUSCULAR at 19:46

## 2019-03-02 RX ADMIN — SODIUM CHLORIDE 75 ML/HR: 9 INJECTION, SOLUTION INTRAVENOUS at 00:57

## 2019-03-02 RX ADMIN — METOCLOPRAMIDE 10 MG: 5 INJECTION, SOLUTION INTRAMUSCULAR; INTRAVENOUS at 13:36

## 2019-03-02 RX ADMIN — HYDROMORPHONE HYDROCHLORIDE 0.5 MG: 1 INJECTION, SOLUTION INTRAMUSCULAR; INTRAVENOUS; SUBCUTANEOUS at 09:33

## 2019-03-02 RX ADMIN — HYDROMORPHONE HYDROCHLORIDE 0.5 MG: 1 INJECTION, SOLUTION INTRAMUSCULAR; INTRAVENOUS; SUBCUTANEOUS at 00:57

## 2019-03-02 RX ADMIN — METOCLOPRAMIDE 10 MG: 5 INJECTION, SOLUTION INTRAMUSCULAR; INTRAVENOUS at 19:46

## 2019-03-02 RX ADMIN — HYDROMORPHONE HYDROCHLORIDE 0.5 MG: 1 INJECTION, SOLUTION INTRAMUSCULAR; INTRAVENOUS; SUBCUTANEOUS at 12:19

## 2019-03-02 RX ADMIN — PANTOPRAZOLE SODIUM 40 MG: 40 TABLET, DELAYED RELEASE ORAL at 05:34

## 2019-03-02 RX ADMIN — METRONIDAZOLE 500 MG: 500 INJECTION, SOLUTION INTRAVENOUS at 00:57

## 2019-03-02 RX ADMIN — ENOXAPARIN SODIUM 40 MG: 40 INJECTION SUBCUTANEOUS at 09:33

## 2019-03-02 RX ADMIN — CEFAZOLIN SODIUM 2 G: 2 INJECTION, SOLUTION INTRAVENOUS at 04:03

## 2019-03-02 RX ADMIN — ONDANSETRON HYDROCHLORIDE 4 MG: 2 SOLUTION INTRAMUSCULAR; INTRAVENOUS at 00:57

## 2019-03-02 RX ADMIN — ONDANSETRON HYDROCHLORIDE 4 MG: 2 SOLUTION INTRAMUSCULAR; INTRAVENOUS at 05:34

## 2019-03-02 RX ADMIN — HYDROMORPHONE HYDROCHLORIDE 0.5 MG: 1 INJECTION, SOLUTION INTRAMUSCULAR; INTRAVENOUS; SUBCUTANEOUS at 05:34

## 2019-03-02 RX ADMIN — KETOROLAC TROMETHAMINE 15 MG: 30 INJECTION, SOLUTION INTRAMUSCULAR at 13:35

## 2019-03-02 NOTE — PROGRESS NOTES
Postoperative day 1 laparoscopic low anterior resection    Complains of pain and some nausea but otherwise doing well  Afebrile vital signs stable  Labs in good order    Abdomen soft, incisions clean  DIPTI serous    -DC DIPTI drain  -Add Toradol for pain  -Add Phenergan and Reglan for nausea

## 2019-03-02 NOTE — PLAN OF CARE
Problem: Patient Care Overview  Goal: Plan of Care Review  Outcome: Ongoing (interventions implemented as appropriate)   03/02/19 2750   Coping/Psychosocial   Plan of Care Reviewed With patient   Plan of Care Review   Progress no change   OTHER   Outcome Summary Pt didnt sleep much. C/o nausea, zofran given few times. Vomited once. Dilaudid given twice. Good urine output. Small DIPTI output. Adam to be removed this morning. Continue to monitor.       Problem: Pain, Acute (Adult)  Goal: Identify Related Risk Factors and Signs and Symptoms  Outcome: Ongoing (interventions implemented as appropriate)    Goal: Acceptable Pain Control/Comfort Level  Outcome: Ongoing (interventions implemented as appropriate)

## 2019-03-03 PROCEDURE — 25010000002 ENOXAPARIN PER 10 MG: Performed by: SURGERY

## 2019-03-03 PROCEDURE — 25010000002 KETOROLAC TROMETHAMINE PER 15 MG: Performed by: SURGERY

## 2019-03-03 PROCEDURE — 25010000002 ONDANSETRON PER 1 MG: Performed by: SURGERY

## 2019-03-03 PROCEDURE — 25010000002 METOCLOPRAMIDE PER 10 MG: Performed by: SURGERY

## 2019-03-03 RX ADMIN — METOCLOPRAMIDE 10 MG: 5 INJECTION, SOLUTION INTRAMUSCULAR; INTRAVENOUS at 08:34

## 2019-03-03 RX ADMIN — METOCLOPRAMIDE 10 MG: 5 INJECTION, SOLUTION INTRAMUSCULAR; INTRAVENOUS at 13:56

## 2019-03-03 RX ADMIN — ENOXAPARIN SODIUM 40 MG: 40 INJECTION SUBCUTANEOUS at 08:34

## 2019-03-03 RX ADMIN — PANTOPRAZOLE SODIUM 40 MG: 40 TABLET, DELAYED RELEASE ORAL at 05:47

## 2019-03-03 RX ADMIN — KETOROLAC TROMETHAMINE 15 MG: 30 INJECTION, SOLUTION INTRAMUSCULAR at 13:56

## 2019-03-03 RX ADMIN — KETOROLAC TROMETHAMINE 15 MG: 30 INJECTION, SOLUTION INTRAMUSCULAR at 20:14

## 2019-03-03 RX ADMIN — ONDANSETRON HYDROCHLORIDE 4 MG: 2 SOLUTION INTRAMUSCULAR; INTRAVENOUS at 12:23

## 2019-03-03 RX ADMIN — METOCLOPRAMIDE 10 MG: 5 INJECTION, SOLUTION INTRAMUSCULAR; INTRAVENOUS at 02:12

## 2019-03-03 RX ADMIN — SODIUM CHLORIDE 75 ML/HR: 9 INJECTION, SOLUTION INTRAVENOUS at 05:37

## 2019-03-03 RX ADMIN — KETOROLAC TROMETHAMINE 15 MG: 30 INJECTION, SOLUTION INTRAMUSCULAR at 02:12

## 2019-03-03 RX ADMIN — KETOROLAC TROMETHAMINE 15 MG: 30 INJECTION, SOLUTION INTRAMUSCULAR at 08:34

## 2019-03-03 RX ADMIN — METOCLOPRAMIDE 10 MG: 5 INJECTION, SOLUTION INTRAMUSCULAR; INTRAVENOUS at 20:14

## 2019-03-03 RX ADMIN — SODIUM CHLORIDE 75 ML/HR: 9 INJECTION, SOLUTION INTRAVENOUS at 20:14

## 2019-03-03 NOTE — PROGRESS NOTES
Postoperative day 2 laparoscopic low anterior resection    Pain is reasonably well controlled, minimal nausea, tolerating full liquids, passing gas    Afebrile vital signs stable    Abdomen soft, incisions clean, DIPTI removed yesterday    Advance diet as tolerated

## 2019-03-03 NOTE — PLAN OF CARE
Problem: Patient Care Overview  Goal: Plan of Care Review  Outcome: Ongoing (interventions implemented as appropriate)   03/03/19 5875   Coping/Psychosocial   Plan of Care Reviewed With patient;spouse   Plan of Care Review   Progress no change   OTHER   Outcome Summary pain controlled with schd toradol, also receiving scd reglan and can have prn zofran or phenergan, up in chair today, walked to BR and in room, not eating much, is passing gas     Goal: Individualization and Mutuality  Outcome: Ongoing (interventions implemented as appropriate)      Problem: Pain, Acute (Adult)  Goal: Identify Related Risk Factors and Signs and Symptoms  Outcome: Outcome(s) achieved Date Met: 03/03/19    Goal: Acceptable Pain Control/Comfort Level  Outcome: Ongoing (interventions implemented as appropriate)      Problem: Nausea/Vomiting (Adult)  Goal: Identify Related Risk Factors and Signs and Symptoms  Outcome: Outcome(s) achieved Date Met: 03/03/19    Goal: Symptom Relief  Outcome: Ongoing (interventions implemented as appropriate)    Goal: Adequate Hydration  Outcome: Ongoing (interventions implemented as appropriate)

## 2019-03-03 NOTE — PLAN OF CARE
Problem: Patient Care Overview  Goal: Plan of Care Review  Outcome: Ongoing (interventions implemented as appropriate)   03/03/19 6294   Coping/Psychosocial   Plan of Care Reviewed With patient   Plan of Care Review   Progress improving   OTHER   Outcome Summary Pt had a good night. Pain is controlled with schedulled toradol. Good urine output. VSS. Continue to monitor.       Problem: Pain, Acute (Adult)  Goal: Identify Related Risk Factors and Signs and Symptoms  Outcome: Ongoing (interventions implemented as appropriate)    Goal: Acceptable Pain Control/Comfort Level  Outcome: Ongoing (interventions implemented as appropriate)

## 2019-03-04 VITALS
HEIGHT: 70 IN | OXYGEN SATURATION: 99 % | WEIGHT: 198.06 LBS | HEART RATE: 65 BPM | RESPIRATION RATE: 16 BRPM | DIASTOLIC BLOOD PRESSURE: 79 MMHG | SYSTOLIC BLOOD PRESSURE: 128 MMHG | BODY MASS INDEX: 28.36 KG/M2 | TEMPERATURE: 98.2 F

## 2019-03-04 PROCEDURE — 25010000002 METOCLOPRAMIDE PER 10 MG: Performed by: SURGERY

## 2019-03-04 PROCEDURE — 25010000002 KETOROLAC TROMETHAMINE PER 15 MG: Performed by: SURGERY

## 2019-03-04 RX ADMIN — METOCLOPRAMIDE 10 MG: 5 INJECTION, SOLUTION INTRAMUSCULAR; INTRAVENOUS at 09:16

## 2019-03-04 RX ADMIN — KETOROLAC TROMETHAMINE 15 MG: 30 INJECTION, SOLUTION INTRAMUSCULAR at 02:35

## 2019-03-04 RX ADMIN — PANTOPRAZOLE SODIUM 40 MG: 40 TABLET, DELAYED RELEASE ORAL at 06:15

## 2019-03-04 RX ADMIN — SODIUM CHLORIDE 75 ML/HR: 9 INJECTION, SOLUTION INTRAVENOUS at 10:46

## 2019-03-04 RX ADMIN — KETOROLAC TROMETHAMINE 15 MG: 30 INJECTION, SOLUTION INTRAMUSCULAR at 09:15

## 2019-03-04 RX ADMIN — METOCLOPRAMIDE 10 MG: 5 INJECTION, SOLUTION INTRAMUSCULAR; INTRAVENOUS at 02:36

## 2019-03-04 NOTE — PLAN OF CARE
Problem: Patient Care Overview  Goal: Plan of Care Review  Outcome: Ongoing (interventions implemented as appropriate)   03/04/19 0566   Coping/Psychosocial   Plan of Care Reviewed With patient   Plan of Care Review   Progress improving   OTHER   Outcome Summary patient with minimal discomfort; ambulated around unit (400'); LBM on 3/3, appetite slightly improved; probable discharge 3/4       Problem: Pain, Acute (Adult)  Goal: Acceptable Pain Control/Comfort Level  Outcome: Ongoing (interventions implemented as appropriate)      Problem: Nausea/Vomiting (Adult)  Goal: Symptom Relief  Outcome: Ongoing (interventions implemented as appropriate)    Goal: Adequate Hydration  Outcome: Ongoing (interventions implemented as appropriate)

## 2019-03-04 NOTE — PLAN OF CARE
Problem: Patient Care Overview  Goal: Plan of Care Review  Outcome: Ongoing (interventions implemented as appropriate)   03/04/19 5910   Coping/Psychosocial   Plan of Care Reviewed With patient;family   Plan of Care Review   Progress improving   OTHER   Outcome Summary pt rested well throughout shift. pt family at bedside throughout the day. pt with minimal discomfort and minimal pain throughtout shift when assessed and re-assessed.      Goal: Individualization and Mutuality   03/03/19 1906   Individualization   Patient Specific Preferences goes by Aníbal       Problem: Pain, Acute (Adult)  Goal: Acceptable Pain Control/Comfort Level  Outcome: Ongoing (interventions implemented as appropriate)      Problem: Nausea/Vomiting (Adult)  Goal: Symptom Relief  Outcome: Ongoing (interventions implemented as appropriate)    Goal: Adequate Hydration  Outcome: Ongoing (interventions implemented as appropriate)

## 2019-03-05 ENCOUNTER — READMISSION MANAGEMENT (OUTPATIENT)
Dept: CALL CENTER | Facility: HOSPITAL | Age: 49
End: 2019-03-05

## 2019-03-05 RX ORDER — HYDROCODONE BITARTRATE AND ACETAMINOPHEN 5; 325 MG/1; MG/1
1 TABLET ORAL EVERY 4 HOURS PRN
Qty: 24 TABLET | Refills: 0 | Status: SHIPPED | OUTPATIENT
Start: 2019-03-05 | End: 2019-03-18

## 2019-03-05 NOTE — OUTREACH NOTE
Prep Survey      Responses   Facility patient discharged from?  [facility]   Is patient eligible?  Yes   Discharge diagnosis  Rectal cancer   Does the patient have one of the following disease processes/diagnoses(primary or secondary)?  General Surgery   Does the patient have Home health ordered?  No   Is there a DME ordered?  No   Prep survey completed?  Yes          Mami Perez RN

## 2019-03-05 NOTE — DISCHARGE SUMMARY
DATE OF ADMIT: 3/1/2019    DATE OF DISCHARGE: 3/4/2019    DIAGNOSIS: Rectal cancer    FINAL PATHOLOGY: T2N0 tumor (invasive moderately differentiated adenocarcinoma with 12 negative lymph nodes)    PROCEDURES: Laparoscopic low anterior resection 3/1/2019    SUMMARY OF HOSPITAL COURSE:   Uneventful postop course. Tolerating diet, incisions in good order and afebrile with stable vital signs at discharge.  Did not require narcotic pain medication at time of discharge and will follow up in 1 week.    Matthew Gardner M.D.

## 2019-03-06 ENCOUNTER — READMISSION MANAGEMENT (OUTPATIENT)
Dept: CALL CENTER | Facility: HOSPITAL | Age: 49
End: 2019-03-06

## 2019-03-06 NOTE — OUTREACH NOTE
General Surgery Week 1 Survey      Responses   Facility patient discharged from?  [facility]   Does the patient have one of the following disease processes/diagnoses(primary or secondary)?  General Surgery   Is there a successful TCM telephone encounter documented?  No   Week 1 attempt successful?  Yes   Call start time  1141   Call end time  1148   Discharge diagnosis  Rectal cancer   Laparoscopic low anterior resection 3/1/2019   Is patient permission given to speak with other caregiver?  Yes   List who call center can speak with  wife, Corrine Bobo reviewed with patient/caregiver?  Yes   Is the patient having any side effects they believe may be caused by any medication additions or changes?  No   Does the patient have all medications related to this admission filled (includes all antibiotics, pain medications, etc.)  Yes   Is the patient taking all medications as directed (includes completed medication regime)?  Yes   Medication comments  Hydrocodone prescribed for pain after discharge.   Does the patient have a follow up appointment scheduled with their surgeon?  Yes   Has the patient kept scheduled appointments due by today?  N/A   Has home health visited the patient within 72 hours of discharge?  N/A   Psychosocial issues?  No   Did the patient receive a copy of their discharge instructions?  Yes   Nursing interventions  Reviewed instructions with patient   What is the patient's perception of their health status since discharge?  Improving   Nursing interventions  Nurse provided patient education   Is the patient /caregiver able to teach back basic post-op care?  Practice 'cough and deep breath', Drive as instructed by MD in discharge instructions, Take showers only when approved by MD-sponge bathe until then, No tub bath, swimming, or hot tub until instructed by MD, Keep incision areas clean,dry and protected, Do not remove steri-strips, Lifting as instructed by MD in discharge instructions, Continue use of  incentive spirometry at least 1 week post discharge   Is the patient/caregiver able to teach back signs and symptoms of incisional infection?  Incisional warmth, Increased drainage or bleeding, Fever, Pus or odor from incision, Increased redness, swelling or pain at the incisonal site   Is the patient/caregiver able to teach back steps to recovery at home?  Set small, achievable goals for return to baseline health, Rest and rebuild strength, gradually increase activity, Eat a well-balance diet [appetite fair]   Is the patient/caregiver able to teach back the hierarchy of who to call/visit for symptoms/problems? PCP, Specialist, Home health nurse, Urgent Care, ED, 911  Yes   Week 1 call completed?  Yes          Fadia Caballero RN

## 2019-03-13 ENCOUNTER — READMISSION MANAGEMENT (OUTPATIENT)
Dept: CALL CENTER | Facility: HOSPITAL | Age: 49
End: 2019-03-13

## 2019-03-13 NOTE — OUTREACH NOTE
General Surgery Week 2 Survey      Responses   Facility patient discharged from?  [facility]   Does the patient have one of the following disease processes/diagnoses(primary or secondary)?  General Surgery   Week 2 attempt successful?  No   Unsuccessful attempts  Attempt 1          Olivia Golden RN

## 2019-03-14 ENCOUNTER — READMISSION MANAGEMENT (OUTPATIENT)
Dept: CALL CENTER | Facility: HOSPITAL | Age: 49
End: 2019-03-14

## 2019-03-14 NOTE — OUTREACH NOTE
General Surgery Week 2 Survey      Responses   Facility patient discharged from?  [facility]   Does the patient have one of the following disease processes/diagnoses(primary or secondary)?  General Surgery   Week 2 attempt successful?  No   Unsuccessful attempts  Attempt 2          Cammie Javier RN

## 2019-03-15 ENCOUNTER — READMISSION MANAGEMENT (OUTPATIENT)
Dept: CALL CENTER | Facility: HOSPITAL | Age: 49
End: 2019-03-15

## 2019-03-15 NOTE — OUTREACH NOTE
General Surgery Week 2 Survey      Responses   Facility patient discharged from?  [facility]   Does the patient have one of the following disease processes/diagnoses(primary or secondary)?  General Surgery   Week 2 attempt successful?  No   Unsuccessful attempts  Attempt 3          Ledy Moulton RN

## 2019-03-18 ENCOUNTER — OFFICE VISIT (OUTPATIENT)
Dept: SURGERY | Facility: CLINIC | Age: 49
End: 2019-03-18

## 2019-03-18 DIAGNOSIS — Z09 FOLLOW UP: Primary | ICD-10-CM

## 2019-03-18 PROCEDURE — 99024 POSTOP FOLLOW-UP VISIT: CPT | Performed by: SURGERY

## 2019-03-19 NOTE — PROGRESS NOTES
Laparoscopic low anterior resection 3/1/2019    Pathology: T2N0 (G-2) tumor  CEA: Preop 4.97    Office visit: Incisions are healing well and overall he is doing well.  Some issues with alternating constipation and diarrhea for which I recommended daily Metamucil use and avoidance of laxatives.    Colonoscopy recommended in 1 year.  Return to work 6 weeks from date of surgery.  [facility] oncology appointment made.

## 2019-03-23 ENCOUNTER — READMISSION MANAGEMENT (OUTPATIENT)
Dept: CALL CENTER | Facility: HOSPITAL | Age: 49
End: 2019-03-23

## 2019-03-23 NOTE — OUTREACH NOTE
General Surgery Week 3 Survey      Responses   Facility patient discharged from?  [facility]   Does the patient have one of the following disease processes/diagnoses(primary or secondary)?  General Surgery   Week 3 attempt successful?  No   Unsuccessful attempts  Attempt 1          Shane Cabrera RN

## 2019-03-25 ENCOUNTER — READMISSION MANAGEMENT (OUTPATIENT)
Dept: CALL CENTER | Facility: HOSPITAL | Age: 49
End: 2019-03-25

## 2019-03-25 NOTE — OUTREACH NOTE
General Surgery Week 3 Survey      Responses   Facility patient discharged from?  [facility]   Does the patient have one of the following disease processes/diagnoses(primary or secondary)?  General Surgery   Week 3 attempt successful?  No   Unsuccessful attempts  Attempt 2          Olivia Golden RN

## 2019-03-27 ENCOUNTER — CONSULT (OUTPATIENT)
Dept: ONCOLOGY | Facility: CLINIC | Age: 49
End: 2019-03-27

## 2019-03-27 ENCOUNTER — LAB (OUTPATIENT)
Dept: OTHER | Facility: HOSPITAL | Age: 49
End: 2019-03-27

## 2019-03-27 ENCOUNTER — TELEPHONE (OUTPATIENT)
Dept: GENERAL RADIOLOGY | Facility: HOSPITAL | Age: 49
End: 2019-03-27

## 2019-03-27 VITALS
HEART RATE: 62 BPM | WEIGHT: 201.4 LBS | BODY MASS INDEX: 29.83 KG/M2 | SYSTOLIC BLOOD PRESSURE: 120 MMHG | TEMPERATURE: 97.7 F | HEIGHT: 69 IN | RESPIRATION RATE: 18 BRPM | OXYGEN SATURATION: 97 % | DIASTOLIC BLOOD PRESSURE: 76 MMHG

## 2019-03-27 DIAGNOSIS — M19.90 ARTHRITIS: ICD-10-CM

## 2019-03-27 DIAGNOSIS — C18.9 MALIGNANT NEOPLASM OF COLON, UNSPECIFIED PART OF COLON (HCC): Primary | ICD-10-CM

## 2019-03-27 DIAGNOSIS — C20 RECTAL CANCER (HCC): Primary | ICD-10-CM

## 2019-03-27 DIAGNOSIS — C20 RECTAL CANCER (HCC): ICD-10-CM

## 2019-03-27 LAB
ALBUMIN SERPL-MCNC: 4.6 G/DL (ref 3.5–5.2)
ALBUMIN/GLOB SERPL: 1.5 G/DL
ALP SERPL-CCNC: 86 U/L (ref 39–117)
ALT SERPL W P-5'-P-CCNC: 41 U/L (ref 1–41)
ANION GAP SERPL CALCULATED.3IONS-SCNC: 8.5 MMOL/L
AST SERPL-CCNC: 41 U/L (ref 1–40)
BASOPHILS # BLD AUTO: 0.06 10*3/MM3 (ref 0–0.2)
BASOPHILS NFR BLD AUTO: 0.8 % (ref 0–1.5)
BILIRUB SERPL-MCNC: 0.4 MG/DL (ref 0.1–1.2)
BUN BLD-MCNC: 14 MG/DL (ref 6–20)
BUN/CREAT SERPL: 14.9 (ref 7–25)
CALCIUM SPEC-SCNC: 9.6 MG/DL (ref 8.6–10.5)
CEA SERPL-MCNC: 3.77 NG/ML
CHLORIDE SERPL-SCNC: 102 MMOL/L (ref 98–107)
CO2 SERPL-SCNC: 29.5 MMOL/L (ref 22–29)
CREAT BLD-MCNC: 0.94 MG/DL (ref 0.76–1.27)
DEPRECATED RDW RBC AUTO: 37.2 FL (ref 37–54)
EOSINOPHIL # BLD AUTO: 0.3 10*3/MM3 (ref 0–0.4)
EOSINOPHIL NFR BLD AUTO: 4 % (ref 0.3–6.2)
ERYTHROCYTE [DISTWIDTH] IN BLOOD BY AUTOMATED COUNT: 11.6 % (ref 12.3–15.4)
GFR SERPL CREATININE-BSD FRML MDRD: 85 ML/MIN/1.73
GLOBULIN UR ELPH-MCNC: 3 GM/DL
GLUCOSE BLD-MCNC: 97 MG/DL (ref 65–99)
HCT VFR BLD AUTO: 46.6 % (ref 37.5–51)
HGB BLD-MCNC: 16.7 G/DL (ref 13–17.7)
IMM GRANULOCYTES # BLD AUTO: 0.08 10*3/MM3 (ref 0–0.05)
IMM GRANULOCYTES NFR BLD AUTO: 1.1 % (ref 0–0.5)
LYMPHOCYTES # BLD AUTO: 1.74 10*3/MM3 (ref 0.7–3.1)
LYMPHOCYTES NFR BLD AUTO: 23.2 % (ref 19.6–45.3)
MCH RBC QN AUTO: 31.5 PG (ref 26.6–33)
MCHC RBC AUTO-ENTMCNC: 35.8 G/DL (ref 31.5–35.7)
MCV RBC AUTO: 87.8 FL (ref 79–97)
MONOCYTES # BLD AUTO: 0.94 10*3/MM3 (ref 0.1–0.9)
MONOCYTES NFR BLD AUTO: 12.5 % (ref 5–12)
NEUTROPHILS # BLD AUTO: 4.39 10*3/MM3 (ref 1.4–7)
NEUTROPHILS NFR BLD AUTO: 58.4 % (ref 42.7–76)
NRBC BLD AUTO-RTO: 0 /100 WBC (ref 0–0)
PLATELET # BLD AUTO: 236 10*3/MM3 (ref 140–450)
PMV BLD AUTO: 9.5 FL (ref 6–12)
POTASSIUM BLD-SCNC: 5.2 MMOL/L (ref 3.5–5.2)
PROT SERPL-MCNC: 7.6 G/DL (ref 6–8.5)
RBC # BLD AUTO: 5.31 10*6/MM3 (ref 4.14–5.8)
SODIUM BLD-SCNC: 140 MMOL/L (ref 136–145)
WBC NRBC COR # BLD: 7.51 10*3/MM3 (ref 3.4–10.8)

## 2019-03-27 PROCEDURE — 80053 COMPREHEN METABOLIC PANEL: CPT | Performed by: INTERNAL MEDICINE

## 2019-03-27 PROCEDURE — 36415 COLL VENOUS BLD VENIPUNCTURE: CPT

## 2019-03-27 PROCEDURE — 82378 CARCINOEMBRYONIC ANTIGEN: CPT | Performed by: INTERNAL MEDICINE

## 2019-03-27 PROCEDURE — 85025 COMPLETE CBC W/AUTO DIFF WBC: CPT | Performed by: INTERNAL MEDICINE

## 2019-03-27 PROCEDURE — 99205 OFFICE O/P NEW HI 60 MIN: CPT | Performed by: INTERNAL MEDICINE

## 2019-03-27 NOTE — PROGRESS NOTES
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 Matthew Gardner    RECORDS OBTAINED:  Records of the patients history including those obtained from the referring provider were reviewed and summarized in detail.    HISTORY OF PRESENT ILLNESS:  The patient is a 48 y.o. year old male who is here for an opinion about the above issue.    History of Present Illness patient is a 48-year-old gentleman who has had history of bright red blood per rectum since last several months.  It was going on for 4 months.  In the past he had similar symptoms and had found a stomach ulcer and was placed on Protonix at that time.  But this time he noted and went to work.  HEENT at work he started getting dizzy and then was taken to the emergency room by his wife.  He had lost 10-15 pounds in the past 4 months but he had a tendency to lose and gain.  He also felt fatigued.    In the emergency room he had a CT abdomen pelvis and that showed extensive fatty liver all of the other organs were unremarkable.  There was no evidence of any acute inflammation or abnormal enhancement.  He had history of having had a commode full of blood at work prior to his ER visit.  The emergency room discharged him with a follow-up and he was seen by Dr. Murphy.    Patient was found to have a 7 mm polyp in the descending colon and also a 10 mm polyp in the sigmoid colon which were resected.  He had a fungating non-obstructing medium-sized mass in the proximal rectum.  The mass was circumferential partially involving one half of the lumen circumference.  It was 3 cm in length and in diameter it was 21 mm.  A biopsy was done and it was consistent with moderately differentiated adenocarcinoma of the rectum.  It was thought to be in the upper  "rectum or proximal rectum.  He also had normal EGD which showed normal esophagus his gastric antrum was mildly erythematous which is biopsied and he had a normal examined duodenum.    On the pathology the colon polyp in the descending colon and the sigmoid colon were all tubular adenomas but the biopsy of the rectum showed moderately differentiated adenocarcinoma.  The antral biopsy was negative and there was no Helicobacter pylori.  Patient underwent MRI which also showed the mass to be in the high rectum and patient was seen by Dr. Gardner and a low anterior resection was done.    Pathology showed invasive moderately differentiated adenocarcinoma 2.5 x 2.3 x 0.5 cm and tumor invades the muscularis propria.  It is grade 2.  There is no lymphovascular space invasion or perineural invasion identified.  Margins were all clear.  0 of 12 lymph nodes were involved.  He has a T2 N0 moderately differentiated adenocarcinoma of the upper rectum.    A portion of the tumor with adjacent normal tissue was sent for MSI and BRAF testing and it is pending.  Multiple sections of the tumor show invasive moderately differentiated adenocarcinoma invading the submucosa and into the muscularis propria.  It does not invade through the muscularis propria in the reviewed sections.  No definite lymphovascular space or perineural invasion seen.    CEA was 4.97 as of February 20, 2019 and this was prior to surgery.    He has been referred here to see further options of treatment.    His father has had a history of colon cancer at age 47.          Past Medical History:   Diagnosis Date   • Abdominal pain    • Allergic    • Allergic rhinitis    • Arthritis    • Colon cancer (CMS/HCC) 02/20/2019    Invasive moderately differentiated adenocarcinoma   • Colon polyps    • Diverticulosis    • Dry mouth    • Fatty liver 02/07/2019   • Gas pain    • H/O Acute GI bleeding 2015   • Insomnia     \"nerves\"   • Kidney stones     in past   • Migraines    • " Rectal bleeding    • Renal disorder    • Stiff neck     holding neck still to avoid dizziness   • Syncope    • Ulcer of abdomen wall (CMS/HCC) 2013        Past Surgical History:   Procedure Laterality Date   • APPENDECTOMY N/A    • COLON RESECTION N/A 3/1/2019    Procedure: COLON RESECTION LAPAROSCOPIC LOW ANTERIOR, flexible sigmoidoscopy;  Surgeon: Matthew Gardner MD;  Location: [facility] MAIN OR;  Service: General   • COLONOSCOPY N/A 2/20/2019    Procedure: COLONOSCOPY with Hot and Cold Polypectomy and Biopsies;  Surgeon: Nino Murphy MD;  Location: [facility] ENDOSCOPY;  Service: Gastroenterology   • CYSTOSCOPY W/ URETERAL STENT PLACEMENT  06/04/2016    Dr. Teddy Kaba   • ENDOSCOPY N/A 2/20/2019    Procedure: ESOPHAGOGASTRODUODENOSCOPY with Biopsies;  Surgeon: Nino Murphy MD;  Location: [facility] ENDOSCOPY;  Service: Gastroenterology   • ENDOSCOPY AND COLONOSCOPY N/A 04/23/2015    tubular adenoma rectal polyp   • KNEE ARTHROSCOPY WITH OPEN LIGAMENT REPAIR Bilateral 1980, 1994    right knee: 1980, left knee: 1994   • ORIF TIBIA/FIBULA FRACTURES Left 1980's   • SHOULDER SURGERY Bilateral     Bicep repair on left, broken shoulder on right    • WISDOM TOOTH EXTRACTION Bilateral         Current Outpatient Medications on File Prior to Visit   Medication Sig Dispense Refill   • acetaminophen (TYLENOL) 500 MG tablet Take 500 mg by mouth Every 6 (Six) Hours As Needed for Mild Pain .     • diphenhydrAMINE (BENADRYL) 50 MG capsule Take 50 mg by mouth Every 6 (Six) Hours As Needed for Itching.     • meclizine (ANTIVERT) 25 MG tablet Take 1 tablet by mouth 3 (Three) Times a Day As Needed for dizziness. 21 tablet 0   • meloxicam (MOBIC) 15 MG tablet Take 1 tablet by mouth Daily. With food (Patient taking differently: Take 15 mg by mouth As Needed. With food) 30 tablet 1   • Multiple Vitamins-Minerals (MULTIVITAMIN ADULT PO) Take 1 tablet by mouth Daily.     • pantoprazole (PROTONIX) 40 MG EC tablet Take 1 tablet  by mouth Daily. 90 tablet 3     Current Facility-Administered Medications on File Prior to Visit   Medication Dose Route Frequency Provider Last Rate Last Dose   • diphenhydrAMINE (BENADRYL) capsule 50 mg  50 mg Oral Once Jose Roberto Martin MD            ALLERGIES:    Allergies   Allergen Reactions   • Contrast Dye Hives   • Erythromycin Unknown (See Comments)     Father was allergic-patient was told not to take due to father's reaction (heart stopped)   • Penicillins Unknown (See Comments)     Severe reaction as a child         Social History     Socioeconomic History   • Marital status:      Spouse name: Corrine   • Number of children: Not on file   • Years of education: High School   • Highest education level: Not on file   Occupational History     Employer: [employer]   Tobacco Use   • Smoking status: Light Tobacco Smoker     Types: Cigars   • Smokeless tobacco: Never Used   • Tobacco comment: a few cigars a year    Substance and Sexual Activity   • Alcohol use: Yes     Comment: social, not daily   • Drug use: No   • Sexual activity: Yes        Family History   Problem Relation Age of Onset   • Heart attack Mother    • Heart disease Mother    • Colon cancer Father    • Colon polyps Father    • Heart disease Father    • Diabetes Father    • Malig Hyperthermia Neg Hx         Review of Systems   Constitutional: Positive for appetite change and fatigue. Negative for chills, diaphoresis, fever and unexpected weight change.   HENT: Negative for hearing loss, sore throat and trouble swallowing.    Respiratory: Positive for cough and shortness of breath. Negative for chest tightness and wheezing.    Cardiovascular: Negative for chest pain, palpitations and leg swelling.   Gastrointestinal: Positive for abdominal pain, blood in stool, constipation, diarrhea and rectal pain. Negative for abdominal distention, nausea and vomiting.   Genitourinary: Negative for dysuria, frequency, hematuria and urgency.  "  Musculoskeletal: Positive for back pain. Negative for joint swelling.        No muscle weakness.   Skin: Negative for rash and wound.   Neurological: Positive for weakness and headaches. Negative for seizures, syncope, speech difficulty and numbness.   Hematological: Negative for adenopathy. Does not bruise/bleed easily.   Psychiatric/Behavioral: Negative for behavioral problems, confusion and suicidal ideas.        Objective     Vitals:    03/27/19 1059   BP: 120/76   Pulse: 62   Resp: 18   Temp: 97.7 °F (36.5 °C)   TempSrc: Oral   SpO2: 97%   Weight: 91.4 kg (201 lb 6.4 oz)   Height: 175.5 cm (69.09\")   PainSc:   9   PainLoc: Abdomen  Comment: Abd and low back pain comes and goes.     Current Status 3/27/2019   ECOG score 0       Physical Exam      GENERAL:  Well-developed, well-nourished in no acute distress.   SKIN:  Warm, dry without rashes, purpura or petechiae.  EYES:  Pupils equal, round and reactive to light.  EOMs intact.  Conjunctivae normal.  EARS:  Hearing intact.  NOSE:  Septum midline.  No excoriations or nasal discharge.  MOUTH:  Tongue is well-papillated; no stomatitis or ulcers.  Lips normal.  THROAT:  Oropharynx without lesions or exudates.  NECK:  Supple with good range of motion; no thyromegaly or masses, no JVD.  LYMPHATICS:  No cervical, supraclavicular, axillary or inguinal adenopathy.  CHEST:  Lungs clear to auscultation. Good airflow.  CARDIAC:  Regular rate and rhythm without murmurs, rubs or gallops. Normal S1,S2.  ABDOMEN:  Soft, nontender with no hepatosplenomegaly or masses.  EXTREMITIES:  No clubbing, cyanosis or edema.  NEUROLOGICAL:  Cranial Nerves II-XII grossly intact.  No focal neurological deficits.  PSYCHIATRIC:  Normal affect and mood.        RECENT LABS:  Hematology WBC   Date Value Ref Range Status   03/27/2019 7.51 3.40 - 10.80 10*3/mm3 Final     RBC   Date Value Ref Range Status   03/27/2019 5.31 4.14 - 5.80 10*6/mm3 Final     Hemoglobin   Date Value Ref Range Status "   03/27/2019 16.7 13.0 - 17.7 g/dL Final     Hematocrit   Date Value Ref Range Status   03/27/2019 46.6 37.5 - 51.0 % Final     Platelets   Date Value Ref Range Status   03/27/2019 236 140 - 450 10*3/mm3 Final      MRI PELVIS   IMPRESSION:     1.  No discrete mass is visualized within the extraperitoneal portion of  the rectum. Irregular thickening of the distal sigmoid colon at the  level of the peritoneal reflection is visualized. I discussed this  finding with Sammy Murphy who agreed that this is likely the location of  the mass seen on colonoscopy. MRI evaluation for local T staging of  malignancy involving this portion of the colon cannot be performed due  to peristalsis and motion of the intraperitoneal bowel. There is a 0.4  cm lymph node adjacent to the sigmoid colon in this location which is  nonspecific and can be reactive versus malignant in etiology.    CT SCANS ABDOMEN AND PELVIS   IMPRESSION :   1. Extensive fatty liver, no acute inflammation or abnormal enhancement    Assessment/Plan     1.  T2 N0 moderately differentiated adenocarcinoma of the upper rectum, newly diagnosed status post low anterior resection by Dr. Gardner.  Patient initially presented with bright red blood per rectum.  This was lasting 4-5 months.  He then underwent EGD colonoscopy by Dr. Murphy.  EGD was negative.  But the colonoscopy showed evidence of an lesion in the upper rectum which was 3 x 2.1 cm and it was  biopsied and consistent with moderately differentiated adenocarcinoma.  Patient was referred to Dr. Gardner and his CEA was 4.97 prior to surgery.  Patient then underwent EGD as well which was negative.  The 2 polyps in the descending colon were negative.    Patient underwent low anterior resection which on pathology shows a 2.5 cm mass extending on into the submucosa with margins clear and not involving the lymphovascular or perineural invasion.  There is 0 of 12 lymph nodes positive.  And the tumor was situated in the  upper rectum.    She has so far not had a staging workup and we will obtain CT scan of the chest abdomen pelvis as well as a bone scan.    If all of the scans are negative then it is possible that we can follow with observation.  No plans for adjuvant chemotherapy.    Plan 1.  Await for  for MSI and BRAF testing and it is pending.    2.  Father having had colon cancer at age 47 we discussed about referral to genetics to see if he has underlying Huang syndrome    3.  Obtain repeat CEA following surgery    4.  We will obtain a CT scan of the  Chest abdomen pelvis and also bone scan for staging purposes    5.  With T2 N0 invasive moderately differentiated carcinoma of the upper rectum I do not believe there is any role for chemotherapy or radiation therapy as he has had complete resection with good margins.    6.  Follow-up with me in 2 weeks.    Will follow closely.    Magalis Chamorro MD      CC: Dr. Matthew Murphy

## 2019-03-27 NOTE — TELEPHONE ENCOUNTER
----- Message from Mily Gregory sent at 3/27/2019 12:07 PM EDT -----  Regarding: PER DR BARKER, PLEASE OVERBOOK  PER DR BARKER, ON THURS 4-11-19, PLEASE OVERBOOK AT HER LUNCH TIME, 2 UNITS, VAZQUEZ @ 12:00 PM.      THANK YOU!!!

## 2019-04-01 ENCOUNTER — HOSPITAL ENCOUNTER (OUTPATIENT)
Dept: CT IMAGING | Facility: HOSPITAL | Age: 49
Discharge: HOME | End: 2019-04-01
Admitting: INTERNAL MEDICINE

## 2019-04-01 ENCOUNTER — HOSPITAL ENCOUNTER (OUTPATIENT)
Dept: NUCLEAR MEDICINE | Facility: HOSPITAL | Age: 49
Discharge: HOME | End: 2019-04-01

## 2019-04-01 ENCOUNTER — APPOINTMENT (OUTPATIENT)
Dept: NUCLEAR MEDICINE | Facility: HOSPITAL | Age: 49
End: 2019-04-01

## 2019-04-01 DIAGNOSIS — C20 RECTAL CANCER (HCC): ICD-10-CM

## 2019-04-01 PROCEDURE — 74176 CT ABD & PELVIS W/O CONTRAST: CPT

## 2019-04-01 PROCEDURE — 70490 CT SOFT TISSUE NECK W/O DYE: CPT

## 2019-04-01 PROCEDURE — 71250 CT THORAX DX C-: CPT

## 2019-04-03 ENCOUNTER — HOSPITAL ENCOUNTER (OUTPATIENT)
Dept: NUCLEAR MEDICINE | Facility: HOSPITAL | Age: 49
Discharge: HOME | End: 2019-04-03

## 2019-04-03 PROCEDURE — 0 TECHNETIUM MEDRONATE KIT: Performed by: INTERNAL MEDICINE

## 2019-04-03 PROCEDURE — A9503 TC99M MEDRONATE: HCPCS | Performed by: INTERNAL MEDICINE

## 2019-04-03 PROCEDURE — 78306 BONE IMAGING WHOLE BODY: CPT

## 2019-04-03 RX ORDER — TC 99M MEDRONATE 20 MG/10ML
23 INJECTION, POWDER, LYOPHILIZED, FOR SOLUTION INTRAVENOUS
Status: COMPLETED | OUTPATIENT
Start: 2019-04-03 | End: 2019-04-03

## 2019-04-03 RX ADMIN — Medication 23 MILLICURIE: at 07:57

## 2019-04-08 ENCOUNTER — DOCUMENTATION (OUTPATIENT)
Dept: ONCOLOGY | Facility: CLINIC | Age: 49
End: 2019-04-08

## 2019-04-08 NOTE — PROGRESS NOTES
The pt was seen on 3/27/19 at [facility] by Dr. Chamorro for an initial medical oncology consultation for rectal cancer. He will return on 4/11/19 for results of testing and recommendations.     The pt completed a Distress Questionnaire and scored 4/10, marking concerns in several areas. OSW attemped to reach the pt today; however, his voicemail box was full, so a message could not be left. OSW will mail the pt an introductory letter explaining oncology social work services in the event he chooses to reach out.     Patti Snyder, [facility]  Oncology Social Worker

## 2019-04-11 ENCOUNTER — OFFICE VISIT (OUTPATIENT)
Dept: ONCOLOGY | Facility: CLINIC | Age: 49
End: 2019-04-11

## 2019-04-11 ENCOUNTER — LAB (OUTPATIENT)
Dept: LAB | Facility: HOSPITAL | Age: 49
End: 2019-04-11

## 2019-04-11 VITALS
TEMPERATURE: 98.6 F | HEIGHT: 69 IN | BODY MASS INDEX: 30.57 KG/M2 | SYSTOLIC BLOOD PRESSURE: 118 MMHG | WEIGHT: 206.4 LBS | DIASTOLIC BLOOD PRESSURE: 86 MMHG | OXYGEN SATURATION: 97 % | HEART RATE: 65 BPM | RESPIRATION RATE: 18 BRPM

## 2019-04-11 DIAGNOSIS — C18.9 MALIGNANT NEOPLASM OF COLON, UNSPECIFIED PART OF COLON (HCC): Primary | ICD-10-CM

## 2019-04-11 DIAGNOSIS — C20 RECTAL CANCER (HCC): ICD-10-CM

## 2019-04-11 LAB
ALBUMIN SERPL-MCNC: 4.4 G/DL (ref 3.5–5.2)
ALBUMIN/GLOB SERPL: 1.7 G/DL (ref 1.1–2.4)
ALP SERPL-CCNC: 80 U/L (ref 38–116)
ALT SERPL W P-5'-P-CCNC: 22 U/L (ref 0–41)
ANION GAP SERPL CALCULATED.3IONS-SCNC: 10.6 MMOL/L
AST SERPL-CCNC: 27 U/L (ref 0–40)
BASOPHILS # BLD AUTO: 0.03 10*3/MM3 (ref 0–0.2)
BASOPHILS NFR BLD AUTO: 0.5 % (ref 0–1.5)
BILIRUB SERPL-MCNC: 0.6 MG/DL (ref 0.2–1.2)
BUN BLD-MCNC: 13 MG/DL (ref 6–20)
BUN/CREAT SERPL: 13.8 (ref 7.3–30)
CALCIUM SPEC-SCNC: 9.3 MG/DL (ref 8.5–10.2)
CHLORIDE SERPL-SCNC: 102 MMOL/L (ref 98–107)
CO2 SERPL-SCNC: 29.4 MMOL/L (ref 22–29)
CREAT BLD-MCNC: 0.94 MG/DL (ref 0.7–1.3)
DEPRECATED RDW RBC AUTO: 39.8 FL (ref 37–54)
EOSINOPHIL # BLD AUTO: 0.2 10*3/MM3 (ref 0–0.4)
EOSINOPHIL NFR BLD AUTO: 3.4 % (ref 0.3–6.2)
ERYTHROCYTE [DISTWIDTH] IN BLOOD BY AUTOMATED COUNT: 11.8 % (ref 12.3–15.4)
GFR SERPL CREATININE-BSD FRML MDRD: 85 ML/MIN/1.73
GLOBULIN UR ELPH-MCNC: 2.6 GM/DL (ref 1.8–3.5)
GLUCOSE BLD-MCNC: 97 MG/DL (ref 74–124)
HCT VFR BLD AUTO: 46.5 % (ref 37.5–51)
HGB BLD-MCNC: 15.9 G/DL (ref 13–17.7)
IMM GRANULOCYTES # BLD AUTO: 0.03 10*3/MM3 (ref 0–0.05)
IMM GRANULOCYTES NFR BLD AUTO: 0.5 % (ref 0–0.5)
LYMPHOCYTES # BLD AUTO: 1.57 10*3/MM3 (ref 0.7–3.1)
LYMPHOCYTES NFR BLD AUTO: 26.9 % (ref 19.6–45.3)
MCH RBC QN AUTO: 31.5 PG (ref 26.6–33)
MCHC RBC AUTO-ENTMCNC: 34.2 G/DL (ref 31.5–35.7)
MCV RBC AUTO: 92.3 FL (ref 79–97)
MONOCYTES # BLD AUTO: 0.74 10*3/MM3 (ref 0.1–0.9)
MONOCYTES NFR BLD AUTO: 12.7 % (ref 5–12)
NEUTROPHILS # BLD AUTO: 3.26 10*3/MM3 (ref 1.4–7)
NEUTROPHILS NFR BLD AUTO: 56 % (ref 42.7–76)
NRBC BLD AUTO-RTO: 0 /100 WBC (ref 0–0)
PLATELET # BLD AUTO: 241 10*3/MM3 (ref 140–450)
PMV BLD AUTO: 9.1 FL (ref 6–12)
POTASSIUM BLD-SCNC: 4.9 MMOL/L (ref 3.5–4.7)
PROT SERPL-MCNC: 7 G/DL (ref 6.3–8)
RBC # BLD AUTO: 5.04 10*6/MM3 (ref 4.14–5.8)
SODIUM BLD-SCNC: 142 MMOL/L (ref 134–145)
WBC NRBC COR # BLD: 5.83 10*3/MM3 (ref 3.4–10.8)

## 2019-04-11 PROCEDURE — 36415 COLL VENOUS BLD VENIPUNCTURE: CPT | Performed by: INTERNAL MEDICINE

## 2019-04-11 PROCEDURE — 80053 COMPREHEN METABOLIC PANEL: CPT | Performed by: INTERNAL MEDICINE

## 2019-04-11 PROCEDURE — 85025 COMPLETE CBC W/AUTO DIFF WBC: CPT | Performed by: INTERNAL MEDICINE

## 2019-04-11 PROCEDURE — 99214 OFFICE O/P EST MOD 30 MIN: CPT | Performed by: INTERNAL MEDICINE

## 2019-04-11 NOTE — PROGRESS NOTES
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 Matthew Gardner      HISTORY OF PRESENT ILLNESS:  The patient is a 48 y.o. year old male who is here for an opinion about the above issue.    History of Present Illness patient with T2N0 adenocarcinoma of the upper rectum status post low anterior resection here for follow-up.  Given T2 N0 tumor, no role for chemoradiation.  I had scheduled a CT scan as well as a bone scan he is here for the results CT scan is negative for any metastasis but there are 2 lucent lesions lucent lesions at T3 and T5, unsure if these are hemangiomas or small metastasis.  However bone scan was negative.  Radiologist suggested MRI of the thoracic spine.  Patient has not lost weight.      He does complain of problems with urination since after the surgery.  I told him to discuss this with Dr. Gardner.    Given that his father also had colon cancer we had referred him to genetics clinic but did not get that appointment.        Oncology history  patient is a 48-year-old gentleman who has had history of bright red blood per rectum since last several months.  It was going on for 4 months.  In the past he had similar symptoms and had found a stomach ulcer and was placed on Protonix at that time.  But this time he noted and went to work.  HEENT at work he started getting dizzy and then was taken to the emergency room by his wife.  He had lost 10-15 pounds in the past 4 months but he had a tendency to lose and gain.  He also felt fatigued.    In the emergency room he had a CT abdomen pelvis and that showed extensive fatty liver all of the other organs were unremarkable.  There was no evidence of any acute inflammation or abnormal enhancement.  He had history of having had a  commode full of blood at work prior to his ER visit.  The emergency room discharged him with a follow-up and he was seen by Dr. Murphy.    Patient was found to have a 7 mm polyp in the descending colon and also a 10 mm polyp in the sigmoid colon which were resected.  He had a fungating non-obstructing medium-sized mass in the proximal rectum.  The mass was circumferential partially involving one half of the lumen circumference.  It was 3 cm in length and in diameter it was 21 mm.  A biopsy was done and it was consistent with moderately differentiated adenocarcinoma of the rectum.  It was thought to be in the upper rectum or proximal rectum.  He also had normal EGD which showed normal esophagus his gastric antrum was mildly erythematous which is biopsied and he had a normal examined duodenum.    On the pathology the colon polyp in the descending colon and the sigmoid colon were all tubular adenomas but the biopsy of the rectum showed moderately differentiated adenocarcinoma.  The antral biopsy was negative and there was no Helicobacter pylori.  Patient underwent MRI which also showed the mass to be in the high rectum and patient was seen by Dr. Gardner and a low anterior resection was done.    Pathology showed invasive moderately differentiated adenocarcinoma 2.5 x 2.3 x 0.5 cm and tumor invades the muscularis propria.  It is grade 2.  There is no lymphovascular space invasion or perineural invasion identified.  Margins were all clear.  0 of 12 lymph nodes were involved.  He has a T2 N0 moderately differentiated adenocarcinoma of the upper rectum.    A portion of the tumor with adjacent normal tissue was sent for MSI and BRAF testing and it is pending.  Multiple sections of the tumor show invasive moderately differentiated adenocarcinoma invading the submucosa and into the muscularis propria.  It does not invade through the muscularis propria in the reviewed sections.  No definite lymphovascular space or perineural  "invasion seen.    CEA was 4.97 as of February 20, 2019 and this was prior to surgery.    He has been referred here to see further options of treatment.    His father has had a history of colon cancer at age 47.          Past Medical History:   Diagnosis Date   • Abdominal pain    • Allergic    • Allergic rhinitis    • Arthritis    • Colon cancer (CMS/HCC) 02/20/2019    Invasive moderately differentiated adenocarcinoma   • Colon polyps    • Diverticulosis    • Dry mouth    • Fatty liver 02/07/2019   • Gas pain    • H/O Acute GI bleeding 2015   • Insomnia     \"nerves\"   • Kidney stones     in past   • Migraines    • Rectal bleeding    • Renal disorder    • Stiff neck     holding neck still to avoid dizziness   • Syncope    • Tattoos    • Ulcer of abdomen wall (CMS/HCC) 2013        Past Surgical History:   Procedure Laterality Date   • APPENDECTOMY N/A    • COLON RESECTION N/A 3/1/2019    Procedure: COLON RESECTION LAPAROSCOPIC LOW ANTERIOR, flexible sigmoidoscopy;  Surgeon: Matthew Gardner MD;  Location: [facility] MAIN OR;  Service: General   • COLONOSCOPY N/A 2/20/2019    Procedure: COLONOSCOPY with Hot and Cold Polypectomy and Biopsies;  Surgeon: Nino Murphy MD;  Location: [facility] ENDOSCOPY;  Service: Gastroenterology   • CYSTOSCOPY W/ URETERAL STENT PLACEMENT  06/04/2016    Dr. Teddy Kaba   • ENDOSCOPY N/A 2/20/2019    Procedure: ESOPHAGOGASTRODUODENOSCOPY with Biopsies;  Surgeon: Nino Murphy MD;  Location: [facility] ENDOSCOPY;  Service: Gastroenterology   • ENDOSCOPY AND COLONOSCOPY N/A 04/23/2015    tubular adenoma rectal polyp   • KNEE ARTHROSCOPY WITH OPEN LIGAMENT REPAIR Bilateral 1980, 1994    right knee: 1980, left knee: 1994   • ORIF TIBIA/FIBULA FRACTURES Left 1980's   • SHOULDER SURGERY Bilateral     Bicep repair on left, broken shoulder on right    • WISDOM TOOTH EXTRACTION Bilateral         Current Outpatient Medications on File Prior to Visit   Medication Sig Dispense Refill   • " acetaminophen (TYLENOL) 500 MG tablet Take 500 mg by mouth Every 6 (Six) Hours As Needed for Mild Pain .     • Aspirin-Acetaminophen-Caffeine (EXCEDRIN MIGRAINE PO) Take  by mouth As Needed.     • diphenhydrAMINE (BENADRYL) 50 MG capsule Take 50 mg by mouth Every 6 (Six) Hours As Needed for Itching.     • Multiple Vitamins-Minerals (MULTIVITAMIN ADULT PO) Take 1 tablet by mouth Daily.     • meclizine (ANTIVERT) 25 MG tablet Take 1 tablet by mouth 3 (Three) Times a Day As Needed for dizziness. 21 tablet 0   • meloxicam (MOBIC) 15 MG tablet Take 1 tablet by mouth Daily. With food (Patient taking differently: Take 15 mg by mouth As Needed. With food) 30 tablet 1   • pantoprazole (PROTONIX) 40 MG EC tablet Take 1 tablet by mouth Daily. 90 tablet 3     Current Facility-Administered Medications on File Prior to Visit   Medication Dose Route Frequency Provider Last Rate Last Dose   • diphenhydrAMINE (BENADRYL) capsule 50 mg  50 mg Oral Once Jose Roberto Martin MD            ALLERGIES:    Allergies   Allergen Reactions   • Contrast Dye Hives   • Erythromycin Unknown (See Comments)     Father was allergic-patient was told not to take due to father's reaction (heart stopped)   • Penicillins Unknown (See Comments)     Severe reaction as a child         Social History     Socioeconomic History   • Marital status:      Spouse name: Corrine   • Number of children: Not on file   • Years of education: High School   • Highest education level: Not on file   Occupational History   • 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     Employer: [employer]   Tobacco Use   • Smoking status: Light Tobacco Smoker     Types: Cigars   • Smokeless tobacco: Never Used   • Tobacco comment: a few cigars a year    Substance and Sexual Activity   • Alcohol use: Yes     Comment: social, not daily   • Drug use: No   • Sexual activity: Yes        Family History   Problem Relation Age of Onset   • Heart attack Mother    • Heart disease Mother    • Heart failure Mother   "  • Colon cancer Father 47   • Colon polyps Father    • Heart disease Father    • Diabetes Father    • Heart failure Father    • Malig Hyperthermia Neg Hx         Review of Systems   Constitutional: Positive for appetite change (04/11/19-same) and fatigue (04/11/19-same). Negative for chills, diaphoresis, fever and unexpected weight change.   HENT: Negative for hearing loss, sore throat and trouble swallowing.    Respiratory: Positive for cough (04/11/19-same) and shortness of breath (04/11/19-same). Negative for chest tightness and wheezing.    Cardiovascular: Negative for chest pain, palpitations and leg swelling.   Gastrointestinal: Positive for abdominal pain (04/11/19-same), constipation (04/11/19-same), diarrhea (04/11/19-same) and rectal pain (04/11/19-same). Negative for abdominal distention, nausea and vomiting.   Genitourinary: Positive for frequency (04/11/19 Urine dark and strong smell.). Negative for dysuria, hematuria and urgency.        Burning with urination   Musculoskeletal: Positive for back pain (04/11/19-same). Negative for joint swelling.        No muscle weakness.   Skin: Negative for rash and wound.   Neurological: Positive for weakness (04/11/19-better) and headaches (04/11/19-same). Negative for seizures, syncope, speech difficulty and numbness.   Hematological: Negative for adenopathy. Does not bruise/bleed easily.   Psychiatric/Behavioral: Negative for behavioral problems, confusion and suicidal ideas.        Objective     Vitals:    04/11/19 1218   BP: 118/86   Pulse: 65   Resp: 18   Temp: 98.6 °F (37 °C)   TempSrc: Oral   SpO2: 97%   Weight: 93.6 kg (206 lb 6.4 oz)   Height: 175.5 cm (69.09\")   PainSc: 0-No pain     Current Status 4/11/2019   ECOG score 0       Physical Exam      GENERAL:  Well-developed, well-nourished in no acute distress.   NECK:  Supple with good range of motion; no thyromegaly or masses, no JVD.  LYMPHATICS:  No cervical, supraclavicular, axillary or inguinal " adenopathy.  CHEST:  Lungs clear to auscultation. Good airflow.  CARDIAC:  Regular rate and rhythm without murmurs, rubs or gallops. Normal S1,S2.  ABDOMEN:  Soft, nontender with no hepatosplenomegaly or masses.  EXTREMITIES:  No clubbing, cyanosis or edema.  NEUROLOGICAL:  Cranial Nerves II-XII grossly intact.  No focal neurological deficits.  PSYCHIATRIC:  Normal affect and mood.        RECENT LABS:  Hematology WBC   Date Value Ref Range Status   04/11/2019 5.83 3.40 - 10.80 10*3/mm3 Final     RBC   Date Value Ref Range Status   04/11/2019 5.04 4.14 - 5.80 10*6/mm3 Final     Hemoglobin   Date Value Ref Range Status   04/11/2019 15.9 13.0 - 17.7 g/dL Final     Hematocrit   Date Value Ref Range Status   04/11/2019 46.5 37.5 - 51.0 % Final     Platelets   Date Value Ref Range Status   04/11/2019 241 140 - 450 10*3/mm3 Final      WHOLE BODY BONE SCAN  IMPRESSION:  Scattered foci of radiotracer activity in the spine is  consistent with degenerative change. There is no scintigraphic evidence  of osseous metastasis.    CT OF THE CHEST ABDOMEN AND PELVIS   IMPRESSION:  1. Status post rectal resection.  2. Punctate nonobstructing stone in each kidney.  3. Fatty infiltration of the liver.  4. Two tiny smoothly marginated lucencies in the approximate T3 and T5  vertebrae. These are nonspecific and could represent small benign  lesions such as small hemangiomas. The possibility of 1 or more tiny  metastatic lesions is not excluded, but thought to be less likely. If  further evaluation is clinically indicated, MRI of the thoracic spine or  correlation with a short-term follow-up CT chest may be helpful.  5. No other definite evidence of metastatic disease.     Radiation dose reduction techniques were utilized, including automated  exposure control and exposure modulation based on body size.    CT OF THE NECK   IMPRESSION:  No evidence of neoplasm on this CT of the neck without  contrast.    MRI PELVIS   IMPRESSION:     1.  No  discrete mass is visualized within the extraperitoneal portion of  the rectum. Irregular thickening of the distal sigmoid colon at the  level of the peritoneal reflection is visualized. I discussed this  finding with Sammy Murphy who agreed that this is likely the location of  the mass seen on colonoscopy. MRI evaluation for local T staging of  malignancy involving this portion of the colon cannot be performed due  to peristalsis and motion of the intraperitoneal bowel. There is a 0.4  cm lymph node adjacent to the sigmoid colon in this location which is  nonspecific and can be reactive versus malignant in etiology.    CT SCANS ABDOMEN AND PELVIS   IMPRESSION :   1. Extensive fatty liver, no acute inflammation or abnormal enhancement    Assessment/Plan     1.  T2 N0 moderately differentiated adenocarcinoma of the upper rectum, newly diagnosed status post low anterior resection by Dr. Gardner.  Patient initially presented with bright red blood per rectum.  This was lasting 4-5 months.  He then underwent EGD colonoscopy by Dr. Murphy.  EGD was negative.  But the colonoscopy showed evidence of an lesion in the upper rectum which was 3 x 2.1 cm and it was  biopsied and consistent with moderately differentiated adenocarcinoma.  Patient was referred to Dr. Gardner and his CEA was 4.97 prior to surgery.  Patient then underwent EGD as well which was negative.  The 2 polyps in the descending colon were negative.    Patient underwent low anterior resection which on pathology shows a 2.5 cm mass extending on into the submucosa with margins clear and not involving the lymphovascular or perineural invasion.  There is 0 of 12 lymph nodes positive.  And the tumor was situated in the upper rectum.    · Reviewed CT scan which is negative except 2 lucent lesions on T3 and T5, suggested MRI of the thoracic spine  · Bone scan negative    2.  Family history of colon cancer with father having colon cancer.  Will refer patients to genetic  counseling.    Plan 1.  Await for  for MSI and BRAF testing and it is pending.    2.  Father having had colon cancer at age 47 we discussed about referral to genetics to see if he has underlying Huang syndrome    3.  Obtain repeat CEA following surgery, reviewed CEA normal    4.  Acute CT scan and bone scan, given lucent lesions on CT scan at thoracic spine will obtain MRI thoracic spine.  This could be possibly hemangioma    5.  With T2 N0 invasive moderately differentiated carcinoma of the upper rectum I do not believe there is any role for chemotherapy or radiation therapy as he has had complete resection with good margins.    6.  Follow-up with me in 2 weeks.        Magalis Chamorro MD      CC: Dr. Matthew Murphy

## 2019-04-17 NOTE — PROGRESS NOTES
MRI Thoracic spine with and without contrast per scheduling request to be changed from with contrast only V.VIGNESH Chamorro

## 2019-04-18 ENCOUNTER — HOSPITAL ENCOUNTER (OUTPATIENT)
Dept: MRI IMAGING | Facility: HOSPITAL | Age: 49
Discharge: HOME | End: 2019-04-18
Admitting: INTERNAL MEDICINE

## 2019-04-18 DIAGNOSIS — C20 RECTAL CANCER (HCC): ICD-10-CM

## 2019-04-18 DIAGNOSIS — C18.9 MALIGNANT NEOPLASM OF COLON, UNSPECIFIED PART OF COLON (HCC): ICD-10-CM

## 2019-04-18 PROCEDURE — A9577 INJ MULTIHANCE: HCPCS | Performed by: INTERNAL MEDICINE

## 2019-04-18 PROCEDURE — 0 GADOBENATE DIMEGLUMINE 529 MG/ML SOLUTION: Performed by: INTERNAL MEDICINE

## 2019-04-18 PROCEDURE — 72157 MRI CHEST SPINE W/O & W/DYE: CPT

## 2019-04-18 RX ADMIN — GADOBENATE DIMEGLUMINE 19 ML: 529 INJECTION, SOLUTION INTRAVENOUS at 07:18

## 2019-04-19 LAB
CYTO UR: NORMAL
LAB AP CASE REPORT: NORMAL
LAB AP CLINICAL INFORMATION: NORMAL
LAB AP DIAGNOSIS COMMENT: NORMAL
LAB AP SYNOPTIC CHECKLIST: NORMAL
Lab: NORMAL
Lab: NORMAL
PATH REPORT.ADDENDUM SPEC: NORMAL
PATH REPORT.FINAL DX SPEC: NORMAL
PATH REPORT.GROSS SPEC: NORMAL

## 2019-05-09 ENCOUNTER — OFFICE VISIT (OUTPATIENT)
Dept: ONCOLOGY | Facility: CLINIC | Age: 49
End: 2019-05-09

## 2019-05-09 ENCOUNTER — LAB (OUTPATIENT)
Dept: LAB | Facility: HOSPITAL | Age: 49
End: 2019-05-09

## 2019-05-09 VITALS
WEIGHT: 205.4 LBS | OXYGEN SATURATION: 96 % | SYSTOLIC BLOOD PRESSURE: 118 MMHG | HEART RATE: 58 BPM | DIASTOLIC BLOOD PRESSURE: 80 MMHG | TEMPERATURE: 98 F | HEIGHT: 69 IN | BODY MASS INDEX: 30.42 KG/M2 | RESPIRATION RATE: 16 BRPM

## 2019-05-09 DIAGNOSIS — C18.9 MALIGNANT NEOPLASM OF COLON, UNSPECIFIED PART OF COLON (HCC): Primary | ICD-10-CM

## 2019-05-09 DIAGNOSIS — C20 RECTAL CANCER (HCC): ICD-10-CM

## 2019-05-09 DIAGNOSIS — C18.9 MALIGNANT NEOPLASM OF COLON, UNSPECIFIED PART OF COLON (HCC): ICD-10-CM

## 2019-05-09 LAB
BASOPHILS # BLD AUTO: 0.06 10*3/MM3 (ref 0–0.2)
BASOPHILS NFR BLD AUTO: 0.8 % (ref 0–1.5)
DEPRECATED RDW RBC AUTO: 37.6 FL (ref 37–54)
EOSINOPHIL # BLD AUTO: 0.27 10*3/MM3 (ref 0–0.4)
EOSINOPHIL NFR BLD AUTO: 3.5 % (ref 0.3–6.2)
ERYTHROCYTE [DISTWIDTH] IN BLOOD BY AUTOMATED COUNT: 11.7 % (ref 12.3–15.4)
HCT VFR BLD AUTO: 42.1 % (ref 37.5–51)
HGB BLD-MCNC: 14.9 G/DL (ref 13–17.7)
IMM GRANULOCYTES # BLD AUTO: 0.04 10*3/MM3 (ref 0–0.05)
IMM GRANULOCYTES NFR BLD AUTO: 0.5 % (ref 0–0.5)
LYMPHOCYTES # BLD AUTO: 1.97 10*3/MM3 (ref 0.7–3.1)
LYMPHOCYTES NFR BLD AUTO: 25.6 % (ref 19.6–45.3)
MCH RBC QN AUTO: 31.6 PG (ref 26.6–33)
MCHC RBC AUTO-ENTMCNC: 35.4 G/DL (ref 31.5–35.7)
MCV RBC AUTO: 89.4 FL (ref 79–97)
MONOCYTES # BLD AUTO: 1.01 10*3/MM3 (ref 0.1–0.9)
MONOCYTES NFR BLD AUTO: 13.1 % (ref 5–12)
NEUTROPHILS # BLD AUTO: 4.36 10*3/MM3 (ref 1.7–7)
NEUTROPHILS NFR BLD AUTO: 56.5 % (ref 42.7–76)
NRBC BLD AUTO-RTO: 0 /100 WBC (ref 0–0.2)
PLATELET # BLD AUTO: 231 10*3/MM3 (ref 140–450)
PMV BLD AUTO: 9.2 FL (ref 6–12)
RBC # BLD AUTO: 4.71 10*6/MM3 (ref 4.14–5.8)
WBC NRBC COR # BLD: 7.71 10*3/MM3 (ref 3.4–10.8)

## 2019-05-09 PROCEDURE — 99213 OFFICE O/P EST LOW 20 MIN: CPT | Performed by: INTERNAL MEDICINE

## 2019-05-09 PROCEDURE — 36415 COLL VENOUS BLD VENIPUNCTURE: CPT | Performed by: INTERNAL MEDICINE

## 2019-05-09 PROCEDURE — 85025 COMPLETE CBC W/AUTO DIFF WBC: CPT | Performed by: INTERNAL MEDICINE

## 2019-05-09 NOTE — PROGRESS NOTES
Subjective     REASON FOR FOLLOW-UP:   1. 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 Fatty liver    3. 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  Given T2 N0 tumor, no role for chemoradiation. Followed by observation with imaging and labs.     He is doing well overall. He does note altered taste sensation and does not like the taste of most things he eats or drinks. He denies any chest pain, blood in stools.   He gets fatigued easily. Patient has had productive cough in past few weeks.     MRI thoracic spine done on 04/18/2019 was normal with no evidence of metastatic disease to T-spine.   NM bone scan done on 04/03/2019 without metastasis.   CT-scans done on 04/01/2019 showed fatty liver and no evidence of metastatic disease.     Given that his father also had colon cancer we had referred him to genetics clinic- has an appointment on June 6th, 2019.     Oncology history:  Patient is a 48-year-old gentleman who has had history of bright red blood per rectum since last several months.  It was going on for 4 months.  In the past he had similar symptoms and had found a stomach ulcer and was placed on Protonix at that time.  But this time he noted and went to work.  HEENT at work he started getting dizzy and then was taken to the emergency room by his wife.  He had lost 10-15 pounds in the past 4 months but he had a tendency to lose and gain.  He also felt fatigued.    In the emergency room he had a CT abdomen pelvis and that showed extensive fatty liver all of the other organs were unremarkable.  There was no evidence of any acute inflammation or abnormal enhancement.  He had history of having had a commode full of blood at work prior to his ER visit.  The  emergency room discharged him with a follow-up and he was seen by Dr. Murphy.    Patient was found to have a 7 mm polyp in the descending colon and also a 10 mm polyp in the sigmoid colon which were resected.  He had a fungating non-obstructing medium-sized mass in the proximal rectum.  The mass was circumferential partially involving one half of the lumen circumference.  It was 3 cm in length and in diameter it was 21 mm.  A biopsy was done and it was consistent with moderately differentiated adenocarcinoma of the rectum.  It was thought to be in the upper rectum or proximal rectum.  He also had normal EGD which showed normal esophagus his gastric antrum was mildly erythematous which is biopsied and he had a normal examined duodenum.    On the pathology the colon polyp in the descending colon and the sigmoid colon were all tubular adenomas but the biopsy of the rectum showed moderately differentiated adenocarcinoma.  The antral biopsy was negative and there was no Helicobacter pylori.  Patient underwent MRI which also showed the mass to be in the high rectum and patient was seen by Dr. Gardner and a low anterior resection was done.    Pathology showed invasive moderately differentiated adenocarcinoma 2.5 x 2.3 x 0.5 cm and tumor invades the muscularis propria.  It is grade 2.  There is no lymphovascular space invasion or perineural invasion identified.  Margins were all clear.  0 of 12 lymph nodes were involved.  He has a T2 N0 moderately differentiated adenocarcinoma of the upper rectum.    A portion of the tumor with adjacent normal tissue was sent for MSI and BRAF testing and it is pending.  Multiple sections of the tumor show invasive moderately differentiated adenocarcinoma invading the submucosa and into the muscularis propria.  It does not invade through the muscularis propria in the reviewed sections.  No definite lymphovascular space or perineural invasion seen.    CEA was 4.97 as of February 20, 2019 and  "this was prior to surgery.    He has been referred here to see further options of treatment.    His father has had a history of colon cancer at age 47.    MRI spine done on 04/18/2019  IMPRESSION:   No evidence for metastatic disease to the thoracic spine. The previously  noted lucent lesions within the T2 and T4 vertebrae are compatible with  Hemangiomas.    NM bone scan done on 04/01/2019:  IMPRESSION:  Scattered foci of radiotracer activity in the spine is  consistent with degenerative change. There is no scintigraphic evidence  of osseous metastasis.    CT neck, chest and abdomen pelvis done on 04/03/2019:  IMPRESSION:  1. Status post rectal resection.  2. Punctate nonobstructing stone in each kidney.  3. Fatty infiltration of the liver.  4. Two tiny smoothly marginated lucencies in the approximate T3 and T5  vertebrae. These are nonspecific and could represent small benign  lesions such as small hemangiomas. The possibility of 1 or more tiny  metastatic lesions is not excluded, but thought to be less likely. If  further evaluation is clinically indicated, MRI of the thoracic spine or  correlation with a short-term follow-up CT chest may be helpful.  5. No other definite evidence of metastatic disease.          Past Medical History:   Diagnosis Date   • Abdominal pain    • Allergic    • Allergic rhinitis    • Arthritis    • Colon cancer (CMS/HCC) 02/20/2019    Invasive moderately differentiated adenocarcinoma   • Colon polyps    • Diverticulosis    • Dry mouth    • Fatty liver 02/07/2019   • Gas pain    • H/O Acute GI bleeding 2015   • Insomnia     \"nerves\"   • Kidney stones     in past   • Migraines    • Rectal bleeding    • Renal disorder    • Stiff neck     holding neck still to avoid dizziness   • Syncope    • Tattoos    • Ulcer of abdomen wall (CMS/HCC) 2013        Past Surgical History:   Procedure Laterality Date   • APPENDECTOMY N/A    • COLON RESECTION N/A 3/1/2019    Procedure: COLON RESECTION " LAPAROSCOPIC LOW ANTERIOR, flexible sigmoidoscopy;  Surgeon: Matthew Gardner MD;  Location: [facility] MAIN OR;  Service: General   • COLONOSCOPY N/A 2/20/2019    Procedure: COLONOSCOPY with Hot and Cold Polypectomy and Biopsies;  Surgeon: Nino Murphy MD;  Location: [facility] ENDOSCOPY;  Service: Gastroenterology   • CYSTOSCOPY W/ URETERAL STENT PLACEMENT  06/04/2016    Dr. Teddy Kaba   • ENDOSCOPY N/A 2/20/2019    Procedure: ESOPHAGOGASTRODUODENOSCOPY with Biopsies;  Surgeon: Nino Murphy MD;  Location: [facility] ENDOSCOPY;  Service: Gastroenterology   • ENDOSCOPY AND COLONOSCOPY N/A 04/23/2015    tubular adenoma rectal polyp   • KNEE ARTHROSCOPY WITH OPEN LIGAMENT REPAIR Bilateral 1980, 1994    right knee: 1980, left knee: 1994   • ORIF TIBIA/FIBULA FRACTURES Left 1980's   • SHOULDER SURGERY Bilateral     Bicep repair on left, broken shoulder on right    • WISDOM TOOTH EXTRACTION Bilateral         Current Outpatient Medications on File Prior to Visit   Medication Sig Dispense Refill   • acetaminophen (TYLENOL) 500 MG tablet Take 500 mg by mouth Every 6 (Six) Hours As Needed for Mild Pain .     • Aspirin-Acetaminophen-Caffeine (EXCEDRIN MIGRAINE PO) Take  by mouth As Needed.     • diphenhydrAMINE (BENADRYL) 50 MG capsule Take 50 mg by mouth Every 6 (Six) Hours As Needed for Itching.     • Multiple Vitamins-Minerals (MULTIVITAMIN ADULT PO) Take 1 tablet by mouth Daily.     • meclizine (ANTIVERT) 25 MG tablet Take 1 tablet by mouth 3 (Three) Times a Day As Needed for dizziness. 21 tablet 0   • meloxicam (MOBIC) 15 MG tablet Take 1 tablet by mouth Daily. With food (Patient taking differently: Take 15 mg by mouth As Needed. With food) 30 tablet 1   • pantoprazole (PROTONIX) 40 MG EC tablet Take 1 tablet by mouth Daily. 90 tablet 3     Current Facility-Administered Medications on File Prior to Visit   Medication Dose Route Frequency Provider Last Rate Last Dose   • diphenhydrAMINE (BENADRYL) capsule 50 mg   50 mg Oral Once Jose Roberto Martin MD            ALLERGIES:    Allergies   Allergen Reactions   • Contrast Dye Hives   • Erythromycin Unknown (See Comments)     Father was allergic-patient was told not to take due to father's reaction (heart stopped)   • Penicillins Unknown (See Comments)     Severe reaction as a child         Social History     Socioeconomic History   • Marital status:      Spouse name: Corrine   • Number of children: Not on file   • Years of education: High School   • Highest education level: Not on file   Occupational History   • 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     Employer: [employer]   Tobacco Use   • Smoking status: Light Tobacco Smoker     Types: Cigars   • Smokeless tobacco: Never Used   • Tobacco comment: a few cigars a year    Substance and Sexual Activity   • Alcohol use: Yes     Comment: social, not daily   • Drug use: No   • Sexual activity: Yes        Family History   Problem Relation Age of Onset   • Heart attack Mother    • Heart disease Mother    • Heart failure Mother    • Colon cancer Father 47   • Colon polyps Father    • Heart disease Father    • Diabetes Father    • Heart failure Father    • Malig Hyperthermia Neg Hx         Review of Systems   Constitutional: Positive for appetite change (04/11/19-same) and fatigue (04/11/19-same). Negative for chills, diaphoresis, fever and unexpected weight change.   HENT: Negative for hearing loss, sore throat and trouble swallowing.    Respiratory: Positive for cough (04/11/19-same) and shortness of breath (04/11/19-same). Negative for chest tightness and wheezing.    Cardiovascular: Negative for chest pain, palpitations and leg swelling.   Gastrointestinal: Positive for abdominal pain (04/11/19-same), constipation (04/11/19-same), diarrhea (04/11/19-same) and rectal pain (04/11/19-same). Negative for abdominal distention, nausea and vomiting.   Genitourinary: Positive for frequency (04/11/19 Urine dark and strong smell.). Negative for  "dysuria, hematuria and urgency.        Burning with urination   Musculoskeletal: Positive for back pain (04/11/19-same). Negative for joint swelling.        No muscle weakness.   Skin: Negative for rash and wound.   Neurological: Positive for weakness (04/11/19-better) and headaches (04/11/19-same). Negative for seizures, syncope, speech difficulty and numbness.   Hematological: Negative for adenopathy. Does not bruise/bleed easily.   Psychiatric/Behavioral: Negative for behavioral problems, confusion and suicidal ideas.        Objective     Vitals:    05/09/19 1529   BP: 118/80   Pulse: 58   Resp: 16   Temp: 98 °F (36.7 °C)   TempSrc: Oral   SpO2: 96%   Weight: 93.2 kg (205 lb 6.4 oz)   Height: 175.5 cm (69.09\")   PainSc: 8  Comment: stomach pain and throat pain     Current Status 5/9/2019   ECOG score 0       Physical Exam    GENERAL:  Well-developed, well-nourished in no acute distress.   NECK:  Supple with good range of motion; no thyromegaly or masses, no JVD.  LYMPHATICS:  No cervical, supraclavicular, axillary or inguinal adenopathy.  CHEST:  Lungs clear to auscultation. Good airflow.  CARDIAC:  Regular rate and rhythm without murmurs, rubs or gallops. Normal S1,S2.  ABDOMEN:  Soft, nontender with no hepatosplenomegaly or masses.  EXTREMITIES:  No clubbing, cyanosis or edema.  NEUROLOGICAL:  Cranial Nerves II-XII grossly intact.  No focal neurological deficits.  PSYCHIATRIC:  Normal affect and mood.          RECENT LABS:  Hematology WBC   Date Value Ref Range Status   05/09/2019 7.71 3.40 - 10.80 10*3/mm3 Final     RBC   Date Value Ref Range Status   05/09/2019 4.71 4.14 - 5.80 10*6/mm3 Final     Hemoglobin   Date Value Ref Range Status   05/09/2019 14.9 13.0 - 17.7 g/dL Final     Hematocrit   Date Value Ref Range Status   05/09/2019 42.1 37.5 - 51.0 % Final     Platelets   Date Value Ref Range Status   05/09/2019 231 140 - 450 10*3/mm3 Final      WHOLE BODY BONE SCAN  IMPRESSION:  Scattered foci of " radiotracer activity in the spine is  consistent with degenerative change. There is no scintigraphic evidence  of osseous metastasis.    CT OF THE CHEST ABDOMEN AND PELVIS   IMPRESSION:  1. Status post rectal resection.  2. Punctate nonobstructing stone in each kidney.  3. Fatty infiltration of the liver.  4. Two tiny smoothly marginated lucencies in the approximate T3 and T5  vertebrae. These are nonspecific and could represent small benign  lesions such as small hemangiomas. The possibility of 1 or more tiny  metastatic lesions is not excluded, but thought to be less likely. If  further evaluation is clinically indicated, MRI of the thoracic spine or  correlation with a short-term follow-up CT chest may be helpful.  5. No other definite evidence of metastatic disease.     Radiation dose reduction techniques were utilized, including automated  exposure control and exposure modulation based on body size.    CT OF THE NECK   IMPRESSION:  No evidence of neoplasm on this CT of the neck without  contrast.    MRI PELVIS   IMPRESSION:     1.  No discrete mass is visualized within the extraperitoneal portion of  the rectum. Irregular thickening of the distal sigmoid colon at the  level of the peritoneal reflection is visualized. I discussed this  finding with Sammy Murphy who agreed that this is likely the location of  the mass seen on colonoscopy. MRI evaluation for local T staging of  malignancy involving this portion of the colon cannot be performed due  to peristalsis and motion of the intraperitoneal bowel. There is a 0.4  cm lymph node adjacent to the sigmoid colon in this location which is  nonspecific and can be reactive versus malignant in etiology.    CT SCANS ABDOMEN AND PELVIS   IMPRESSION :   1. Extensive fatty liver, no acute inflammation or abnormal enhancement    Assessment/Plan     1.  T2 N0 moderately differentiated adenocarcinoma of the upper rectum, newly diagnosed status post low anterior resection by  Dr. Gardner.  Patient initially presented with bright red blood per rectum.  This was lasting 4-5 months.  He then underwent EGD colonoscopy by Dr. Murphy.  EGD was negative.  But the colonoscopy showed evidence of an lesion in the upper rectum which was 3 x 2.1 cm and it was  biopsied and consistent with moderately differentiated adenocarcinoma.  Patient was referred to Dr. Gardner and his CEA was 4.97 prior to surgery.  Patient then underwent EGD as well which was negative.  The 2 polyps in the descending colon were negative.    Patient underwent low anterior resection which on pathology shows a 2.5 cm mass extending on into the submucosa with margins clear and not involving the lymphovascular or perineural invasion.  There is 0 out of 12 lymph nodes positive.  And the tumor was situated in the upper rectum.  · Reviewed CT scan which is negative except 2 lucent lesions on T3 and T5, suggested MRI of the thoracic spine  · MRI done on 04/18/19 is normal without evidence of malignancy and lesion on T3 and T5 are hemangioma's.   · Bone scan is negative  · Followed up with observation with CT scans in 6 months.     2.  Family history of colon cancer with father having colon cancer.  Patient is to follow up with  on June 6th, 2019    Plan   1. Patient followed up by observation.   2. MRI, bone scan and CT-scans reviewed with patient today are normal without evidence of malignancy  3. Father having had colon cancer at age 47- Patient is to follow up with  on June 6th, 2019.   4. Encouraged patient on good diet and regular exercise.   5. Follow-up with me in 6 months with labs and CT-scans done 1 week prior.     I, Alicia Hsieh, acted as scribe for Magalis Chamorro MD  in documenting the service or procedure. To the best of my knowledge, I recorded what was dictated by MD Magalis Llanes MD      CC: Dr. Matthew Murphy

## 2019-05-28 ENCOUNTER — CLINICAL SUPPORT (OUTPATIENT)
Dept: OTHER | Facility: HOSPITAL | Age: 49
End: 2019-05-28

## 2019-05-28 DIAGNOSIS — C18.9 MALIGNANT NEOPLASM OF COLON, UNSPECIFIED PART OF COLON (HCC): Primary | ICD-10-CM

## 2019-05-28 DIAGNOSIS — Z80.0 FAMILY HISTORY OF COLON CANCER: ICD-10-CM

## 2019-05-28 PROCEDURE — G0463 HOSPITAL OUTPT CLINIC VISIT: HCPCS

## 2019-05-28 NOTE — PATIENT INSTRUCTIONS
Pt seen by Dr. Williamson for genetic counseling and testing. Lab drawn with 21g butterfly needle in Left AC x 1 attempt. Sent to [facility]. Pt informed she would receive a phone call when test results.

## 2019-06-10 ENCOUNTER — OFFICE VISIT (OUTPATIENT)
Dept: FAMILY MEDICINE CLINIC | Facility: CLINIC | Age: 49
End: 2019-06-10

## 2019-06-10 VITALS
OXYGEN SATURATION: 98 % | HEART RATE: 64 BPM | HEIGHT: 69 IN | WEIGHT: 201.6 LBS | BODY MASS INDEX: 29.86 KG/M2 | DIASTOLIC BLOOD PRESSURE: 78 MMHG | TEMPERATURE: 98.6 F | SYSTOLIC BLOOD PRESSURE: 106 MMHG

## 2019-06-10 DIAGNOSIS — C18.7 MALIGNANT NEOPLASM OF SIGMOID COLON (HCC): Primary | ICD-10-CM

## 2019-06-10 DIAGNOSIS — M54.5 LOW BACK PAIN, UNSPECIFIED BACK PAIN LATERALITY, UNSPECIFIED CHRONICITY, WITH SCIATICA PRESENCE UNSPECIFIED: ICD-10-CM

## 2019-06-10 PROCEDURE — 99213 OFFICE O/P EST LOW 20 MIN: CPT | Performed by: FAMILY MEDICINE

## 2019-06-10 RX ORDER — CYCLOBENZAPRINE HCL 5 MG
5 TABLET ORAL 3 TIMES DAILY PRN
Qty: 30 TABLET | Refills: 1 | Status: SHIPPED | OUTPATIENT
Start: 2019-06-10 | End: 2019-10-22

## 2019-06-10 RX ORDER — MELOXICAM 15 MG/1
15 TABLET ORAL DAILY
Qty: 30 TABLET | Refills: 1 | Status: SHIPPED | OUTPATIENT
Start: 2019-06-10 | End: 2019-10-22 | Stop reason: SDUPTHER

## 2019-06-10 RX ORDER — MELOXICAM 15 MG/1
15 TABLET ORAL DAILY
Qty: 30 TABLET | Refills: 1 | Status: SHIPPED | OUTPATIENT
Start: 2019-06-10 | End: 2019-06-10 | Stop reason: SDUPTHER

## 2019-06-10 NOTE — PROGRESS NOTES
SUBJECTIVE:  The patient is a 48-year-old white male is here for follow-up.  He needs FMLA forms.  He has ongoing intermittent low back pain.  He was recently diagnosed with colon cancer and had surgery in March.  He is doing well other than some fatigue.  His back is aggravating him a little more as of late.    PAST MEDICAL HISTORY:  Reviewed.    REVIEW OF SYSTEMS:  Please see above; all others reviewed and are negative negative.      OBJECTIVE:   Vitals signs are reviewed and are stable.    General:  Well-nourished.  Alert and oriented x3 in no acute distress.  HEENT: PERRLA.   Neck:  Supple.   Lungs:  Clear.    Heart:  Regular rate and rhythm.   Abdomen:   Soft, nontender.  Healed incisions.  Back: Tender lower lumbar region.  Full range of motion.  Straight leg raise is negative.  Extremities:  No cyanosis, clubbing or edema.   Neurological:  Grossly intact without motor or sensory deficits.     ASSESSMENT:    Acute/chronic back pain  Colon cancer  PLAN: FMLA forms provided.  Meloxicam 50 mg daily and Flexeril 5 mg 3 times daily as needed.  Follow-up if symptoms do not improve.  Call if problems.    Dictated utilizing Dragon dictation.

## 2019-10-22 ENCOUNTER — OFFICE VISIT (OUTPATIENT)
Dept: FAMILY MEDICINE CLINIC | Facility: CLINIC | Age: 49
End: 2019-10-22

## 2019-10-22 VITALS
WEIGHT: 205.8 LBS | TEMPERATURE: 98.5 F | BODY MASS INDEX: 30.48 KG/M2 | HEART RATE: 63 BPM | SYSTOLIC BLOOD PRESSURE: 100 MMHG | DIASTOLIC BLOOD PRESSURE: 78 MMHG | OXYGEN SATURATION: 99 % | HEIGHT: 69 IN

## 2019-10-22 DIAGNOSIS — J01.00 ACUTE MAXILLARY SINUSITIS, RECURRENCE NOT SPECIFIED: ICD-10-CM

## 2019-10-22 DIAGNOSIS — M79.10 MYALGIA: ICD-10-CM

## 2019-10-22 DIAGNOSIS — J20.9 BRONCHITIS WITH BRONCHOSPASM: Primary | ICD-10-CM

## 2019-10-22 LAB
ERYTHROCYTE [DISTWIDTH] IN BLOOD BY AUTOMATED COUNT: 12.4 % (ref 12.3–15.4)
FLUAV AG NPH QL: NEGATIVE
FLUBV AG NPH QL IA: NEGATIVE
HCT VFR BLD AUTO: 45.7 % (ref 37.5–51)
HGB BLD-MCNC: 15.5 G/DL (ref 13–17.7)
LYMPHOCYTES # BLD AUTO: 1.8 10*3/MM3 (ref 0.7–3.1)
LYMPHOCYTES NFR BLD AUTO: 22.5 % (ref 19.6–45.3)
MCH RBC QN AUTO: 31.6 PG (ref 26.6–33)
MCHC RBC AUTO-ENTMCNC: 33.8 G/DL (ref 31.5–35.7)
MCV RBC AUTO: 93.4 FL (ref 79–97)
MONOCYTES # BLD AUTO: 0.8 10*3/MM3 (ref 0.1–0.9)
MONOCYTES NFR BLD AUTO: 9.7 % (ref 5–12)
NEUTROPHILS # BLD AUTO: 5.5 10*3/MM3 (ref 1.7–7)
NEUTROPHILS NFR BLD AUTO: 67.8 % (ref 42.7–76)
PLATELET # BLD AUTO: 264 10*3/MM3 (ref 140–450)
PMV BLD AUTO: 7.2 FL (ref 6–12)
RBC # BLD AUTO: 4.89 10*6/MM3 (ref 4.14–5.8)
WBC NRBC COR # BLD: 8.1 10*3/MM3 (ref 3.4–10.8)

## 2019-10-22 PROCEDURE — 71046 X-RAY EXAM CHEST 2 VIEWS: CPT | Performed by: INTERNAL MEDICINE

## 2019-10-22 PROCEDURE — 85025 COMPLETE CBC W/AUTO DIFF WBC: CPT | Performed by: INTERNAL MEDICINE

## 2019-10-22 PROCEDURE — 36415 COLL VENOUS BLD VENIPUNCTURE: CPT | Performed by: INTERNAL MEDICINE

## 2019-10-22 PROCEDURE — 87804 INFLUENZA ASSAY W/OPTIC: CPT | Performed by: INTERNAL MEDICINE

## 2019-10-22 PROCEDURE — 99214 OFFICE O/P EST MOD 30 MIN: CPT | Performed by: INTERNAL MEDICINE

## 2019-10-22 RX ORDER — BENZONATATE 100 MG/1
CAPSULE ORAL
Qty: 30 CAPSULE | Refills: 0 | Status: SHIPPED | OUTPATIENT
Start: 2019-10-22 | End: 2019-11-26

## 2019-10-22 RX ORDER — ALBUTEROL SULFATE 90 UG/1
2 AEROSOL, METERED RESPIRATORY (INHALATION) EVERY 4 HOURS PRN
Qty: 1 INHALER | Refills: 0 | Status: SHIPPED | OUTPATIENT
Start: 2019-10-22 | End: 2019-12-16

## 2019-10-22 RX ORDER — LEVOFLOXACIN 500 MG/1
500 TABLET, FILM COATED ORAL DAILY
Qty: 10 TABLET | Refills: 0 | Status: SHIPPED | OUTPATIENT
Start: 2019-10-22 | End: 2019-11-26

## 2019-10-22 RX ORDER — PREDNISONE 10 MG/1
10 TABLET ORAL DAILY
Qty: 20 TABLET | Refills: 0 | Status: SHIPPED | OUTPATIENT
Start: 2019-10-22 | End: 2019-11-26

## 2019-10-22 NOTE — PROGRESS NOTES
Subjective   Jamil Kamara is a 49 y.o. male.   Ill for 5 days now.  Hot and coughing increased wheezing myalgias moderate  History of Present Illness no personal history of asthma some sputum mainly predominant cough mild throat irritation as well as myalgias.  No definite flu exposure  Felt hot  Not taken temmp  Some  Wheezing    Ill sinc Thursday.  No history of asthma or pulmonary issues.  Does include contrast dye causing hives erythromycin unknown and penicillin unknown.  Patient's light smoker.  But only cigars family is positive for heart disease heart failure colon cancer colon polyps diabetes vomiting only with coughing spells.  Cough will disturb his sleep unable to lay down flat at night.  Review of Systems   HENT: Positive for sore throat.    Respiratory: Positive for cough.    Musculoskeletal: Positive for myalgias.   All other systems reviewed and are negative.      Objective   Vitals:    10/22/19 1444   BP: 100/78   Pulse: 63   Temp: 98.5 °F (36.9 °C)   SpO2: 99%   Weight: 93.4 kg (205 lb 12.8 oz)     Physical Exam   Constitutional: He appears well-developed and well-nourished.   HENT:   Head: Normocephalic and atraumatic.   Right Ear: External ear normal.   Left Ear: External ear normal.   Throat with minimal inflammation, no exudate.  Pressure both maxillary sinuses.   Eyes: Conjunctivae are normal. Pupils are equal, round, and reactive to light.   Cardiovascular: Normal rate, regular rhythm and normal heart sounds.   Pulmonary/Chest: Effort normal.   Faint wheezes bilaterally left worse than right.  Also mild rales heard left lung base.   Abdominal: Soft. Bowel sounds are normal.   Neurological: He is alert.   Unremarkable gait and station   Skin: Skin is warm and dry.   Nursing note and vitals reviewed.      Lab Results   Component Value Date    INR 1.01 02/07/2019       Procedures  Peak flow chest x-ray PA lateral obtained.  No active parenchymal infiltrate seen.  No bony or soft tissue  maladies are noted.  Do have a comparison chest x-ray from 5/31/2017 without changes.  Assessment/Plan   1.  Bronchitis with bronchospasm plan prednisone 40 mg Ã-2 days, 30 mg Ã-2 days, 20 mg Ã-2 days, 10 mg Ã-2 days.,  Tessalon, albuterol, Levaquin 5 mg daily for 10 days time follow-up if not well in 2 days time or call no work through Thursday    2.  Myalgias plan observation for now flu swab was negative    3.\Maxillary sinusitis plan Levaquin should suffice if not well in 10 days time patient let us know    Much of this encounter note is an electronic transcription/translation of spoken language to printed text.  The electronic translation of spoken language may permit erroneous, or at times, nonsensical words or phrases to be inadvertently transcribed.  Although I have reviewed the note for such errors, some may still exist. If there are questions or for further clarification, please contact me.

## 2019-10-30 ENCOUNTER — HOSPITAL ENCOUNTER (OUTPATIENT)
Dept: PET IMAGING | Facility: HOSPITAL | Age: 49
Discharge: HOME | End: 2019-10-30
Admitting: INTERNAL MEDICINE

## 2019-10-30 DIAGNOSIS — C18.9 MALIGNANT NEOPLASM OF COLON, UNSPECIFIED PART OF COLON (HCC): ICD-10-CM

## 2019-10-30 PROCEDURE — 0 DIATRIZOATE MEGLUMINE & SODIUM PER 1 ML: Performed by: INTERNAL MEDICINE

## 2019-10-30 PROCEDURE — 74176 CT ABD & PELVIS W/O CONTRAST: CPT

## 2019-10-30 PROCEDURE — 71250 CT THORAX DX C-: CPT

## 2019-10-30 RX ADMIN — DIATRIZOATE MEGLUMINE AND DIATRIZOATE SODIUM 30 ML: 660; 100 LIQUID ORAL; RECTAL at 14:03

## 2019-11-04 ENCOUNTER — APPOINTMENT (OUTPATIENT)
Dept: LAB | Facility: HOSPITAL | Age: 49
End: 2019-11-04

## 2019-11-04 ENCOUNTER — OFFICE VISIT (OUTPATIENT)
Dept: ONCOLOGY | Facility: CLINIC | Age: 49
End: 2019-11-04

## 2019-11-04 VITALS — HEIGHT: 69 IN | BODY MASS INDEX: 30.31 KG/M2

## 2019-11-04 DIAGNOSIS — C20 RECTAL CANCER (HCC): Primary | ICD-10-CM

## 2019-11-04 PROCEDURE — 99213 OFFICE O/P EST LOW 20 MIN: CPT | Performed by: INTERNAL MEDICINE

## 2019-11-04 PROCEDURE — G0463 HOSPITAL OUTPT CLINIC VISIT: HCPCS | Performed by: INTERNAL MEDICINE

## 2019-11-04 NOTE — PROGRESS NOTES
Subjective     REASON FOR FOLLOW-UP:   1. 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 Fatty liver    3. 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  Given T2 N0 tumor, no role for chemoradiation. Followed by observation with imaging and labs.     He is doing well overall. He does note altered taste sensation and does not like the taste of most things he eats or drinks. He denies any chest pain, blood in stools.   He gets fatigued easily. Patient has had productive cough in past few weeks.     MRI thoracic spine done on 04/18/2019 was normal with no evidence of metastatic disease to T-spine.   NM bone scan done on 04/03/2019 without metastasis.   CT-scans done on 04/01/2019 showed fatty liver and no evidence of metastatic disease.     Given that his father also had colon cancer we had referred him to genetics clinic- has an appointment on June 6th, 2019.     Oncology history:  Patient is a 48-year-old gentleman who has had history of bright red blood per rectum since last several months.  It was going on for 4 months.  In the past he had similar symptoms and had found a stomach ulcer and was placed on Protonix at that time.  But this time he noted and went to work.  HEENT at work he started getting dizzy and then was taken to the emergency room by his wife.  He had lost 10-15 pounds in the past 4 months but he had a tendency to lose and gain.  He also felt fatigued.    In the emergency room he had a CT abdomen pelvis and that showed extensive fatty liver all of the other organs were unremarkable.  There was no evidence of any acute inflammation or abnormal enhancement.  He had history of having had a commode full of blood at work prior to his ER visit.  The  emergency room discharged him with a follow-up and he was seen by Dr. Murphy.    Patient was found to have a 7 mm polyp in the descending colon and also a 10 mm polyp in the sigmoid colon which were resected.  He had a fungating non-obstructing medium-sized mass in the proximal rectum.  The mass was circumferential partially involving one half of the lumen circumference.  It was 3 cm in length and in diameter it was 21 mm.  A biopsy was done and it was consistent with moderately differentiated adenocarcinoma of the rectum.  It was thought to be in the upper rectum or proximal rectum.  He also had normal EGD which showed normal esophagus his gastric antrum was mildly erythematous which is biopsied and he had a normal examined duodenum.    On the pathology the colon polyp in the descending colon and the sigmoid colon were all tubular adenomas but the biopsy of the rectum showed moderately differentiated adenocarcinoma.  The antral biopsy was negative and there was no Helicobacter pylori.  Patient underwent MRI which also showed the mass to be in the high rectum and patient was seen by Dr. Gardner and a low anterior resection was done.    Pathology showed invasive moderately differentiated adenocarcinoma 2.5 x 2.3 x 0.5 cm and tumor invades the muscularis propria.  It is grade 2.  There is no lymphovascular space invasion or perineural invasion identified.  Margins were all clear.  0 of 12 lymph nodes were involved.  He has a T2 N0 moderately differentiated adenocarcinoma of the upper rectum.    A portion of the tumor with adjacent normal tissue was sent for MSI and BRAF testing and it is pending.  Multiple sections of the tumor show invasive moderately differentiated adenocarcinoma invading the submucosa and into the muscularis propria.  It does not invade through the muscularis propria in the reviewed sections.  No definite lymphovascular space or perineural invasion seen.    CEA was 4.97 as of February 20, 2019 and  "this was prior to surgery.    He has been referred here to see further options of treatment.    His father has had a history of colon cancer at age 47.    MRI spine done on 04/18/2019  IMPRESSION:   No evidence for metastatic disease to the thoracic spine. The previously  noted lucent lesions within the T2 and T4 vertebrae are compatible with  Hemangiomas.    NM bone scan done on 04/01/2019:  IMPRESSION:  Scattered foci of radiotracer activity in the spine is  consistent with degenerative change. There is no scintigraphic evidence  of osseous metastasis.    CT neck, chest and abdomen pelvis done on 04/03/2019:  IMPRESSION:  1. Status post rectal resection.  2. Punctate nonobstructing stone in each kidney.  3. Fatty infiltration of the liver.  4. Two tiny smoothly marginated lucencies in the approximate T3 and T5  vertebrae. These are nonspecific and could represent small benign  lesions such as small hemangiomas. The possibility of 1 or more tiny  metastatic lesions is not excluded, but thought to be less likely. If  further evaluation is clinically indicated, MRI of the thoracic spine or  correlation with a short-term follow-up CT chest may be helpful.  5. No other definite evidence of metastatic disease.          Past Medical History:   Diagnosis Date   • Abdominal pain    • Allergic    • Allergic rhinitis    • Arthritis    • Colon cancer (CMS/HCC) 02/20/2019    Invasive moderately differentiated adenocarcinoma   • Colon polyps    • Diverticulosis    • Dry mouth    • Fatty liver 02/07/2019   • Gas pain    • H/O Acute GI bleeding 2015   • Insomnia     \"nerves\"   • Kidney stones     in past   • Migraines    • Rectal bleeding    • Renal disorder    • Stiff neck     holding neck still to avoid dizziness   • Syncope    • Tattoos    • Ulcer of abdomen wall (CMS/HCC) 2013        Past Surgical History:   Procedure Laterality Date   • APPENDECTOMY N/A    • COLON RESECTION N/A 3/1/2019    Procedure: COLON RESECTION " LAPAROSCOPIC LOW ANTERIOR, flexible sigmoidoscopy;  Surgeon: Matthew Gardner MD;  Location: [facility] MAIN OR;  Service: General   • COLONOSCOPY N/A 2/20/2019    Procedure: COLONOSCOPY with Hot and Cold Polypectomy and Biopsies;  Surgeon: Nino Murphy MD;  Location: [facility] ENDOSCOPY;  Service: Gastroenterology   • CYSTOSCOPY W/ URETERAL STENT PLACEMENT  06/04/2016    Dr. Teddy Kaba   • ENDOSCOPY N/A 2/20/2019    Procedure: ESOPHAGOGASTRODUODENOSCOPY with Biopsies;  Surgeon: Nino Murphy MD;  Location: [facility] ENDOSCOPY;  Service: Gastroenterology   • ENDOSCOPY AND COLONOSCOPY N/A 04/23/2015    tubular adenoma rectal polyp   • KNEE ARTHROSCOPY WITH OPEN LIGAMENT REPAIR Bilateral 1980, 1994    right knee: 1980, left knee: 1994   • ORIF TIBIA/FIBULA FRACTURES Left 1980's   • SHOULDER SURGERY Bilateral     Bicep repair on left, broken shoulder on right    • WISDOM TOOTH EXTRACTION Bilateral         Current Outpatient Medications on File Prior to Visit   Medication Sig Dispense Refill   • acetaminophen (TYLENOL) 500 MG tablet Take 500 mg by mouth Every 6 (Six) Hours As Needed for Mild Pain .     • albuterol sulfate  (90 Base) MCG/ACT inhaler Inhale 2 puffs Every 4 (Four) Hours As Needed for Wheezing. 2 puffs every 4 hours when necessary dyspnea 1 inhaler 0   • Aspirin-Acetaminophen-Caffeine (EXCEDRIN MIGRAINE PO) Take  by mouth As Needed.     • benzonatate (TESSALON PERLES) 100 MG capsule 1-2 tabs q8h prn  #30 30 capsule 0   • diphenhydrAMINE (BENADRYL) 50 MG capsule Take 50 mg by mouth Every 6 (Six) Hours As Needed for Itching.     • levoFLOXacin (LEVAQUIN) 500 MG tablet Take 1 tablet by mouth Daily. 1 tab qd x 10d 10 tablet 0   • meloxicam (MOBIC) 15 MG tablet Take 1 tablet by mouth Daily. With food (Patient taking differently: Take 15 mg by mouth As Needed. With food) 30 tablet 1   • Multiple Vitamins-Minerals (MULTIVITAMIN ADULT PO) Take 1 tablet by mouth Daily.     • predniSONE (DELTASONE)  10 MG tablet Take 1 tablet by mouth Daily. 4 tabs x 2d, 3 tabs x2d,2 tabs x 2d, 1 tab x 2d 20 tablet 0     Current Facility-Administered Medications on File Prior to Visit   Medication Dose Route Frequency Provider Last Rate Last Dose   • diphenhydrAMINE (BENADRYL) capsule 50 mg  50 mg Oral Once Jose Roberto Martin MD            ALLERGIES:    Allergies   Allergen Reactions   • Contrast Dye Hives   • Erythromycin Unknown (See Comments)     Father was allergic-patient was told not to take due to father's reaction (heart stopped)   • Penicillins Unknown (See Comments)     Severe reaction as a child         Social History     Socioeconomic History   • Marital status:      Spouse name: Corrine   • Number of children: Not on file   • Years of education: High School   • Highest education level: Not on file   Occupational History   • 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     Employer: [employer]   Tobacco Use   • Smoking status: Light Tobacco Smoker     Types: Cigars   • Smokeless tobacco: Never Used   • Tobacco comment: a few cigars a year    Substance and Sexual Activity   • Alcohol use: Yes     Comment: social, not daily   • Drug use: No   • Sexual activity: Yes        Family History   Problem Relation Age of Onset   • Heart attack Mother    • Heart disease Mother    • Heart failure Mother    • Colon cancer Father 47   • Colon polyps Father    • Heart disease Father    • Diabetes Father    • Heart failure Father    • Malig Hyperthermia Neg Hx         Review of Systems   Constitutional: Positive for appetite change (11/04/19-Unchanged) and fatigue (11/04/19-Unchanged). Negative for chills, diaphoresis, fever and unexpected weight change.   HENT: Negative for hearing loss, sore throat and trouble swallowing.    Respiratory: Positive for cough (11/04/19-Unchanged) and shortness of breath (11/04/19-Unchanged). Negative for chest tightness and wheezing.    Cardiovascular: Negative for chest pain, palpitations and leg swelling.  "  Gastrointestinal: Positive for abdominal pain (11/04/19-Unchanged), constipation (11/04/19-Unchanged), diarrhea (11/04/19-Unchanged) and rectal pain (11/04/19-Unchanged). Negative for abdominal distention, nausea and vomiting.   Genitourinary: Positive for frequency (11/04/19-Unchanged Urine dark and strong smell.). Negative for dysuria, hematuria and urgency.        Burning with urination   Musculoskeletal: Positive for back pain (11/04/19-Unchanged). Negative for joint swelling.        No muscle weakness.   Skin: Negative for rash and wound.   Neurological: Positive for weakness (11/04/19-Unchanged) and headaches (11/04/19-Unchanged). Negative for seizures, syncope, speech difficulty and numbness.   Hematological: Negative for adenopathy. Does not bruise/bleed easily.   Psychiatric/Behavioral: Negative for behavioral problems, confusion and suicidal ideas.        Objective     Vitals:    11/04/19 1521   Height: 175.5 cm (69.09\")     Current Status 5/9/2019   ECOG score 0       Physical Exam    GENERAL:  Well-developed, well-nourished in no acute distress.   NECK:  Supple with good range of motion; no thyromegaly or masses, no JVD.  LYMPHATICS:  No cervical, supraclavicular, axillary or inguinal adenopathy.  CHEST:  Lungs clear to auscultation. Good airflow.  CARDIAC:  Regular rate and rhythm without murmurs, rubs or gallops. Normal S1,S2.  ABDOMEN:  Soft, nontender with no hepatosplenomegaly or masses.  EXTREMITIES:  No clubbing, cyanosis or edema.  NEUROLOGICAL:  Cranial Nerves II-XII grossly intact.  No focal neurological deficits.  PSYCHIATRIC:  Normal affect and mood.          RECENT LABS:  Hematology WBC   Date Value Ref Range Status   10/22/2019 8.10 3.40 - 10.80 10*3/mm3 Final     RBC   Date Value Ref Range Status   10/22/2019 4.89 4.14 - 5.80 10*6/mm3 Final     Hemoglobin   Date Value Ref Range Status   10/22/2019 15.5 13.0 - 17.7 g/dL Final     Hematocrit   Date Value Ref Range Status   10/22/2019 45.7 " 37.5 - 51.0 % Final     Platelets   Date Value Ref Range Status   10/22/2019 264 140 - 450 10*3/mm3 Final      WHOLE BODY BONE SCAN  IMPRESSION:  Scattered foci of radiotracer activity in the spine is  consistent with degenerative change. There is no scintigraphic evidence  of osseous metastasis.    CT OF THE CHEST ABDOMEN AND PELVIS   IMPRESSION:  1. Status post rectal resection.  2. Punctate nonobstructing stone in each kidney.  3. Fatty infiltration of the liver.  4. Two tiny smoothly marginated lucencies in the approximate T3 and T5  vertebrae. These are nonspecific and could represent small benign  lesions such as small hemangiomas. The possibility of 1 or more tiny  metastatic lesions is not excluded, but thought to be less likely. If  further evaluation is clinically indicated, MRI of the thoracic spine or  correlation with a short-term follow-up CT chest may be helpful.  5. No other definite evidence of metastatic disease.     Radiation dose reduction techniques were utilized, including automated  exposure control and exposure modulation based on body size.    CT OF THE NECK   IMPRESSION:  No evidence of neoplasm on this CT of the neck without  contrast.    MRI PELVIS   IMPRESSION:     1.  No discrete mass is visualized within the extraperitoneal portion of  the rectum. Irregular thickening of the distal sigmoid colon at the  level of the peritoneal reflection is visualized. I discussed this  finding with Sammy Murphy who agreed that this is likely the location of  the mass seen on colonoscopy. MRI evaluation for local T staging of  malignancy involving this portion of the colon cannot be performed due  to peristalsis and motion of the intraperitoneal bowel. There is a 0.4  cm lymph node adjacent to the sigmoid colon in this location which is  nonspecific and can be reactive versus malignant in etiology.    CT SCANS ABDOMEN AND PELVIS   IMPRESSION :   1. Extensive fatty liver, no acute inflammation or  abnormal enhancement    Assessment/Plan     1.  T2 N0 moderately differentiated adenocarcinoma of the upper rectum, newly diagnosed status post low anterior resection by Dr. Gardner.  Patient initially presented with bright red blood per rectum.  This was lasting 4-5 months.  He then underwent EGD colonoscopy by Dr. Murphy.  EGD was negative.  But the colonoscopy showed evidence of an lesion in the upper rectum which was 3 x 2.1 cm and it was  biopsied and consistent with moderately differentiated adenocarcinoma.  Patient was referred to Dr. Gardner and his CEA was 4.97 prior to surgery.  Patient then underwent EGD as well which was negative.  The 2 polyps in the descending colon were negative.    Patient underwent low anterior resection which on pathology shows a 2.5 cm mass extending on into the submucosa with margins clear and not involving the lymphovascular or perineural invasion.  There is 0 out of 12 lymph nodes positive.  And the tumor was situated in the upper rectum.  · Reviewed CT scan which is negative except 2 lucent lesions on T3 and T5, suggested MRI of the thoracic spine  · MRI done on 04/18/19 is normal without evidence of malignancy and lesion on T3 and T5 are hemangioma's.   · Bone scan is negative  · Followed up with observation with CT scans in 6 months.     2.  Family history of colon cancer with father having colon cancer.  Patient is to follow up with  on June 6th, 2019    Plan   1. Patient followed up by observation.   2. MRI, bone scan and CT-scans reviewed with patient today are normal without evidence of malignancy  3. Father having had colon cancer at age 47- Patient is to follow up with  on June 6th, 2019.   4. Encouraged patient on good diet and regular exercise.   5. Follow-up with me in 6 months with labs and CT-scans done 1 week prior.     I, Alicia Hsieh, acted as scribe for Magalis Chamorro MD  in documenting the service or procedure. To the best of my  knowledge, I recorded what was dictated by MD Magalis Llanes MD      CC: Dr. Matthew Murphy

## 2019-11-26 ENCOUNTER — OFFICE VISIT (OUTPATIENT)
Dept: FAMILY MEDICINE CLINIC | Facility: CLINIC | Age: 49
End: 2019-11-26

## 2019-11-26 VITALS
BODY MASS INDEX: 30.66 KG/M2 | OXYGEN SATURATION: 97 % | SYSTOLIC BLOOD PRESSURE: 120 MMHG | DIASTOLIC BLOOD PRESSURE: 80 MMHG | HEART RATE: 68 BPM | WEIGHT: 207 LBS | TEMPERATURE: 98.6 F | HEIGHT: 69 IN

## 2019-11-26 DIAGNOSIS — J98.01 BRONCHOSPASM: ICD-10-CM

## 2019-11-26 DIAGNOSIS — R06.00 DYSPNEA, UNSPECIFIED TYPE: ICD-10-CM

## 2019-11-26 DIAGNOSIS — R07.2 PRECORDIAL PAIN: Primary | ICD-10-CM

## 2019-11-26 DIAGNOSIS — J20.9 ACUTE BRONCHITIS, UNSPECIFIED ORGANISM: ICD-10-CM

## 2019-11-26 DIAGNOSIS — Z82.49 FAMILY HISTORY OF CORONARY ARTERY DISEASE: ICD-10-CM

## 2019-11-26 DIAGNOSIS — R94.31 ABNORMAL EKG: ICD-10-CM

## 2019-11-26 PROCEDURE — 99214 OFFICE O/P EST MOD 30 MIN: CPT | Performed by: FAMILY MEDICINE

## 2019-11-26 PROCEDURE — 93000 ELECTROCARDIOGRAM COMPLETE: CPT | Performed by: FAMILY MEDICINE

## 2019-11-26 RX ORDER — DOXYCYCLINE HYCLATE 100 MG/1
100 CAPSULE ORAL 2 TIMES DAILY
Qty: 20 CAPSULE | Refills: 0 | Status: SHIPPED | OUTPATIENT
Start: 2019-11-26 | End: 2020-03-16

## 2019-11-26 RX ORDER — CYCLOBENZAPRINE HCL 5 MG
5 TABLET ORAL EVERY 8 HOURS PRN
Qty: 30 TABLET | Refills: 1 | Status: SHIPPED | OUTPATIENT
Start: 2019-11-26 | End: 2020-03-16 | Stop reason: SDUPTHER

## 2019-11-26 NOTE — PROGRESS NOTES
SUBJECTIVE:  The patient is a 49-year-old white male.  He is here primarily for back pain and FMLA forms.  However he complains of shortness of breath and chest pain.  He feels like he has bronchitis because October roughly a month ago he presented with shortness of breath and was treated with antibiotics and improved.  However sometime after that his symptoms return.  His pain is on the right side of chest.  Sometimes it is related to lifting his arm up.  No cardiac history.  No diaphoresis or syncope.  He does have a history of colon cancer.  His mother and father both had heart disease.    PAST MEDICAL HISTORY:  Reviewed.    REVIEW OF SYSTEMS:  Please see above; 14 point ROS negative.      OBJECTIVE:   Vitals signs are reviewed and are stable.    General:  Well-nourished.  Alert and oriented x3 in no acute distress.  HEENT: PERRLA.   Neck:  Supple.   Lungs:  Clear.    Heart:  Regular rate and rhythm.   Abdomen:   Soft, nontender.   Extremities:  No cyanosis, clubbing or edema.   Neurological:  Grossly intact without motor or sensory deficits.     ECG 12 Lead  Date/Time: 11/26/2019 4:08 PM  Performed by: Pedro Roa MD  Authorized by: Pedro Roa MD   Rhythm: sinus bradycardia  T inversion: V1 and V2          EKG is done here and interpreted by me.  Indication chest pain.  February 7, 2019 for comparison.  EKG shows sinus bradycardia.  Unchanged except for T wave inversion in V2  ASSESSMENT:    Chest pain  Lumbar pain  Bronchitis with bronchospasm  Family history of coronary artery disease    PLAN: He is advised to go to emergency room should he have any chest pain.  Stress echo was ordered.  We will not treat with anti-inflammatories due to his history of GI bleeding.  Muscle relaxers are refilled.  Vibramycin 100 mg twice daily.  Flexeril 5 mg 3 times daily as needed.  Follow-up in a couple days if not improved.  I again stressed the patient should he have any more significant chest pain he should go to  emergency room.

## 2019-12-10 ENCOUNTER — HOSPITAL ENCOUNTER (OUTPATIENT)
Dept: CARDIOLOGY | Facility: HOSPITAL | Age: 49
Discharge: HOME | End: 2019-12-10
Admitting: FAMILY MEDICINE

## 2019-12-10 VITALS
BODY MASS INDEX: 30.66 KG/M2 | DIASTOLIC BLOOD PRESSURE: 84 MMHG | HEART RATE: 75 BPM | SYSTOLIC BLOOD PRESSURE: 122 MMHG | WEIGHT: 207 LBS | HEIGHT: 69 IN

## 2019-12-10 DIAGNOSIS — R07.2 PRECORDIAL PAIN: ICD-10-CM

## 2019-12-10 DIAGNOSIS — R06.00 DYSPNEA, UNSPECIFIED TYPE: ICD-10-CM

## 2019-12-10 LAB
AORTIC ROOT ANNULUS: 2.2 CM
ASCENDING AORTA: 3.8 CM
BH CV ECHO MEAS - ACS: 2.3 CM
BH CV ECHO MEAS - AO MAX PG: 6.6 MMHG
BH CV ECHO MEAS - AO ROOT AREA (BSA CORRECTED): 2
BH CV ECHO MEAS - AO ROOT AREA: 13.1 CM^2
BH CV ECHO MEAS - AO ROOT DIAM: 4.1 CM
BH CV ECHO MEAS - AO V2 MAX: 128.6 CM/SEC
BH CV ECHO MEAS - ASC AORTA: 3.8 CM
BH CV ECHO MEAS - BSA(HAYCOCK): 2.2 M^2
BH CV ECHO MEAS - BSA: 2.1 M^2
BH CV ECHO MEAS - BZI_BMI: 30.6 KILOGRAMS/M^2
BH CV ECHO MEAS - BZI_METRIC_HEIGHT: 175.3 CM
BH CV ECHO MEAS - BZI_METRIC_WEIGHT: 93.9 KG
BH CV ECHO MEAS - EDV(MOD-SP2): 72 ML
BH CV ECHO MEAS - EDV(MOD-SP4): 88 ML
BH CV ECHO MEAS - EDV(TEICH): 108.8 ML
BH CV ECHO MEAS - EF(CUBED): 68.5 %
BH CV ECHO MEAS - EF(MOD-BP): 62 %
BH CV ECHO MEAS - EF(MOD-SP2): 63.9 %
BH CV ECHO MEAS - EF(MOD-SP4): 80.7 %
BH CV ECHO MEAS - EF(TEICH): 59.9 %
BH CV ECHO MEAS - ESV(MOD-SP2): 26 ML
BH CV ECHO MEAS - ESV(MOD-SP4): 17 ML
BH CV ECHO MEAS - ESV(TEICH): 43.6 ML
BH CV ECHO MEAS - FS: 31.9 %
BH CV ECHO MEAS - IVS/LVPW: 0.98
BH CV ECHO MEAS - IVSD: 0.85 CM
BH CV ECHO MEAS - LAT PEAK E' VEL: 9 CM/SEC
BH CV ECHO MEAS - LV DIASTOLIC VOL/BSA (35-75): 42 ML/M^2
BH CV ECHO MEAS - LV MASS(C)D: 140.3 GRAMS
BH CV ECHO MEAS - LV MASS(C)DI: 66.9 GRAMS/M^2
BH CV ECHO MEAS - LV SYSTOLIC VOL/BSA (12-30): 8.1 ML/M^2
BH CV ECHO MEAS - LVIDD: 4.8 CM
BH CV ECHO MEAS - LVIDS: 3.3 CM
BH CV ECHO MEAS - LVLD AP2: 7.4 CM
BH CV ECHO MEAS - LVLD AP4: 8 CM
BH CV ECHO MEAS - LVLS AP2: 5.6 CM
BH CV ECHO MEAS - LVLS AP4: 7 CM
BH CV ECHO MEAS - LVPWD: 0.87 CM
BH CV ECHO MEAS - MED PEAK E' VEL: 6 CM/SEC
BH CV ECHO MEAS - MR MAX PG: 77.2 MMHG
BH CV ECHO MEAS - MR MAX VEL: 439.4 CM/SEC
BH CV ECHO MEAS - MV A DUR: 0.12 SEC
BH CV ECHO MEAS - MV A MAX VEL: 69.4 CM/SEC
BH CV ECHO MEAS - MV DEC SLOPE: 257.1 CM/SEC^2
BH CV ECHO MEAS - MV DEC TIME: 0.26 SEC
BH CV ECHO MEAS - MV E MAX VEL: 75.8 CM/SEC
BH CV ECHO MEAS - MV E/A: 1.1
BH CV ECHO MEAS - MV P1/2T MAX VEL: 76.3 CM/SEC
BH CV ECHO MEAS - MV P1/2T: 86.9 MSEC
BH CV ECHO MEAS - MVA P1/2T LCG: 2.9 CM^2
BH CV ECHO MEAS - MVA(P1/2T): 2.5 CM^2
BH CV ECHO MEAS - PULM A REVS DUR: 0.12 SEC
BH CV ECHO MEAS - PULM A REVS VEL: 34.7 CM/SEC
BH CV ECHO MEAS - PULM DIAS VEL: 37.7 CM/SEC
BH CV ECHO MEAS - PULM S/D: 1.3
BH CV ECHO MEAS - PULM SYS VEL: 49.3 CM/SEC
BH CV ECHO MEAS - SI(CUBED): 36.7 ML/M^2
BH CV ECHO MEAS - SI(MOD-SP2): 21.9 ML/M^2
BH CV ECHO MEAS - SI(MOD-SP4): 33.9 ML/M^2
BH CV ECHO MEAS - SI(TEICH): 31.1 ML/M^2
BH CV ECHO MEAS - SV(CUBED): 76.9 ML
BH CV ECHO MEAS - SV(MOD-SP2): 46 ML
BH CV ECHO MEAS - SV(MOD-SP4): 71 ML
BH CV ECHO MEAS - SV(TEICH): 65.2 ML
BH CV ECHO MEAS - TAPSE (>1.6): 2.7 CM2
BH CV ECHO MEASUREMENTS AVERAGE E/E' RATIO: 10.11
BH CV STRESS BP STAGE 1: NORMAL
BH CV STRESS BP STAGE 2: NORMAL
BH CV STRESS BP STAGE 3: NORMAL
BH CV STRESS DURATION MIN STAGE 1: 3
BH CV STRESS DURATION MIN STAGE 2: 3
BH CV STRESS DURATION MIN STAGE 3: 3
BH CV STRESS DURATION MIN STAGE 4: 1
BH CV STRESS DURATION SEC STAGE 1: 0
BH CV STRESS DURATION SEC STAGE 2: 0
BH CV STRESS DURATION SEC STAGE 3: 0
BH CV STRESS DURATION SEC STAGE 4: 30
BH CV STRESS ECHO POST STRESS EJECTION FRACTION EF: 80 %
BH CV STRESS GRADE STAGE 1: 10
BH CV STRESS GRADE STAGE 2: 12
BH CV STRESS GRADE STAGE 3: 14
BH CV STRESS GRADE STAGE 4: 16
BH CV STRESS HR STAGE 1: 90
BH CV STRESS HR STAGE 2: 109
BH CV STRESS HR STAGE 3: 140
BH CV STRESS HR STAGE 4: 158
BH CV STRESS METS STAGE 1: 5
BH CV STRESS METS STAGE 2: 7.5
BH CV STRESS METS STAGE 3: 10
BH CV STRESS METS STAGE 4: 13.5
BH CV STRESS PROTOCOL 1: NORMAL
BH CV STRESS SPEED STAGE 1: 1.7
BH CV STRESS SPEED STAGE 2: 2.5
BH CV STRESS SPEED STAGE 3: 3.4
BH CV STRESS SPEED STAGE 4: 4.2
BH CV STRESS STAGE 1: 1
BH CV STRESS STAGE 2: 2
BH CV STRESS STAGE 3: 3
BH CV STRESS STAGE 4: 4
BH CV XLRA - RV BASE: 2.7 CM
BH CV XLRA - RV LENGTH: 6.8 CM
BH CV XLRA - RV MID: 2.3 CM
BH CV XLRA - TDI S': 12 CM/SEC
LEFT ATRIUM VOLUME INDEX: 12 ML/M2
MAXIMAL PREDICTED HEART RATE: 170 BPM
PERCENT MAX PREDICTED HR: 92.94 %
SINUS: 4 CM
STJ: 3.2 CM
STRESS BASELINE BP: NORMAL MMHG
STRESS BASELINE HR: 75 BPM
STRESS PERCENT HR: 109 %
STRESS POST ESTIMATED WORKLOAD: 12 METS
STRESS POST EXERCISE DUR MIN: 10 MIN
STRESS POST EXERCISE DUR SEC: 30 SEC
STRESS POST PEAK BP: NORMAL MMHG
STRESS POST PEAK HR: 158 BPM
STRESS TARGET HR: 145 BPM

## 2019-12-10 PROCEDURE — 93016 CV STRESS TEST SUPVJ ONLY: CPT | Performed by: INTERNAL MEDICINE

## 2019-12-10 PROCEDURE — 93017 CV STRESS TEST TRACING ONLY: CPT

## 2019-12-10 PROCEDURE — 93018 CV STRESS TEST I&R ONLY: CPT | Performed by: INTERNAL MEDICINE

## 2019-12-10 PROCEDURE — 25010000002 PERFLUTREN (DEFINITY) 8.476 MG IN SODIUM CHLORIDE 0.9 % 10 ML INJECTION: Performed by: FAMILY MEDICINE

## 2019-12-10 PROCEDURE — 93320 DOPPLER ECHO COMPLETE: CPT | Performed by: INTERNAL MEDICINE

## 2019-12-10 PROCEDURE — 93325 DOPPLER ECHO COLOR FLOW MAPG: CPT | Performed by: INTERNAL MEDICINE

## 2019-12-10 PROCEDURE — 93350 STRESS TTE ONLY: CPT | Performed by: INTERNAL MEDICINE

## 2019-12-10 PROCEDURE — 93352 ADMIN ECG CONTRAST AGENT: CPT | Performed by: INTERNAL MEDICINE

## 2019-12-10 PROCEDURE — 93320 DOPPLER ECHO COMPLETE: CPT

## 2019-12-10 PROCEDURE — 93325 DOPPLER ECHO COLOR FLOW MAPG: CPT

## 2019-12-10 PROCEDURE — 93350 STRESS TTE ONLY: CPT

## 2019-12-10 RX ADMIN — PERFLUTREN 3 ML: 6.52 INJECTION, SUSPENSION INTRAVENOUS at 11:50

## 2019-12-16 ENCOUNTER — LAB (OUTPATIENT)
Dept: LAB | Facility: HOSPITAL | Age: 49
End: 2019-12-16

## 2019-12-16 ENCOUNTER — OFFICE VISIT (OUTPATIENT)
Dept: ONCOLOGY | Facility: CLINIC | Age: 49
End: 2019-12-16

## 2019-12-16 VITALS
HEART RATE: 67 BPM | TEMPERATURE: 97.8 F | OXYGEN SATURATION: 99 % | BODY MASS INDEX: 30.17 KG/M2 | HEIGHT: 69 IN | DIASTOLIC BLOOD PRESSURE: 99 MMHG | WEIGHT: 203.7 LBS | RESPIRATION RATE: 18 BRPM | SYSTOLIC BLOOD PRESSURE: 158 MMHG

## 2019-12-16 DIAGNOSIS — C18.9 MALIGNANT NEOPLASM OF COLON, UNSPECIFIED PART OF COLON (HCC): ICD-10-CM

## 2019-12-16 DIAGNOSIS — C20 RECTAL CANCER (HCC): Primary | ICD-10-CM

## 2019-12-16 DIAGNOSIS — Z80.0 FAMILY HISTORY OF COLON CANCER IN FATHER: ICD-10-CM

## 2019-12-16 LAB
ALBUMIN SERPL-MCNC: 4.5 G/DL (ref 3.5–5.2)
ALBUMIN/GLOB SERPL: 1.5 G/DL (ref 1.1–2.4)
ALP SERPL-CCNC: 83 U/L (ref 38–116)
ALT SERPL W P-5'-P-CCNC: 18 U/L (ref 0–41)
ANION GAP SERPL CALCULATED.3IONS-SCNC: 12.7 MMOL/L (ref 5–15)
AST SERPL-CCNC: 29 U/L (ref 0–40)
BASOPHILS # BLD AUTO: 0.04 10*3/MM3 (ref 0–0.2)
BASOPHILS NFR BLD AUTO: 0.6 % (ref 0–1.5)
BILIRUB SERPL-MCNC: 0.6 MG/DL (ref 0.2–1.2)
BUN BLD-MCNC: 18 MG/DL (ref 6–20)
BUN/CREAT SERPL: 17.5 (ref 7.3–30)
CALCIUM SPEC-SCNC: 9.3 MG/DL (ref 8.5–10.2)
CEA SERPL-MCNC: 5.56 NG/ML
CHLORIDE SERPL-SCNC: 102 MMOL/L (ref 98–107)
CO2 SERPL-SCNC: 27.3 MMOL/L (ref 22–29)
CREAT BLD-MCNC: 1.03 MG/DL (ref 0.7–1.3)
DEPRECATED RDW RBC AUTO: 38.7 FL (ref 37–54)
EOSINOPHIL # BLD AUTO: 0.27 10*3/MM3 (ref 0–0.4)
EOSINOPHIL NFR BLD AUTO: 4.2 % (ref 0.3–6.2)
ERYTHROCYTE [DISTWIDTH] IN BLOOD BY AUTOMATED COUNT: 11.6 % (ref 12.3–15.4)
GFR SERPL CREATININE-BSD FRML MDRD: 76 ML/MIN/1.73
GLOBULIN UR ELPH-MCNC: 3 GM/DL (ref 1.8–3.5)
GLUCOSE BLD-MCNC: 108 MG/DL (ref 74–124)
HCT VFR BLD AUTO: 48.7 % (ref 37.5–51)
HGB BLD-MCNC: 16.8 G/DL (ref 13–17.7)
IMM GRANULOCYTES # BLD AUTO: 0.03 10*3/MM3 (ref 0–0.05)
IMM GRANULOCYTES NFR BLD AUTO: 0.5 % (ref 0–0.5)
LYMPHOCYTES # BLD AUTO: 1.4 10*3/MM3 (ref 0.7–3.1)
LYMPHOCYTES NFR BLD AUTO: 21.7 % (ref 19.6–45.3)
MCH RBC QN AUTO: 31.6 PG (ref 26.6–33)
MCHC RBC AUTO-ENTMCNC: 34.5 G/DL (ref 31.5–35.7)
MCV RBC AUTO: 91.5 FL (ref 79–97)
MONOCYTES # BLD AUTO: 0.94 10*3/MM3 (ref 0.1–0.9)
MONOCYTES NFR BLD AUTO: 14.6 % (ref 5–12)
NEUTROPHILS # BLD AUTO: 3.78 10*3/MM3 (ref 1.7–7)
NEUTROPHILS NFR BLD AUTO: 58.4 % (ref 42.7–76)
NRBC BLD AUTO-RTO: 0 /100 WBC (ref 0–0.2)
PLATELET # BLD AUTO: 255 10*3/MM3 (ref 140–450)
PMV BLD AUTO: 9.1 FL (ref 6–12)
POTASSIUM BLD-SCNC: 4.7 MMOL/L (ref 3.5–4.7)
PROT SERPL-MCNC: 7.5 G/DL (ref 6.3–8)
RBC # BLD AUTO: 5.32 10*6/MM3 (ref 4.14–5.8)
SODIUM BLD-SCNC: 142 MMOL/L (ref 134–145)
WBC NRBC COR # BLD: 6.46 10*3/MM3 (ref 3.4–10.8)

## 2019-12-16 PROCEDURE — 36415 COLL VENOUS BLD VENIPUNCTURE: CPT

## 2019-12-16 PROCEDURE — 99214 OFFICE O/P EST MOD 30 MIN: CPT | Performed by: INTERNAL MEDICINE

## 2019-12-16 PROCEDURE — 82378 CARCINOEMBRYONIC ANTIGEN: CPT | Performed by: INTERNAL MEDICINE

## 2019-12-16 PROCEDURE — 85025 COMPLETE CBC W/AUTO DIFF WBC: CPT

## 2019-12-16 PROCEDURE — 80053 COMPREHEN METABOLIC PANEL: CPT

## 2019-12-16 NOTE — PROGRESS NOTES
Subjective     REASON FOR FOLLOW-UP:   1. 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 Fatty liver    3. 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 Genetics testing negative.    History of Present Illness   Patient with T2N0 adenocarcinoma of the upper rectum status post low anterior resection here for follow-up.  Given T2 N0 tumor, no role for chemoradiation. Followed by observation with imaging and labs.     He reports feeling well overall.  He met with Dr. Roa who had ordered a test to check for a weak heart.  He will follow up with him soon.  His surgery was on 03/01/19 with Dr. Gardner.  He has been eating well, but his diet consists of mostly fried foods.  I have advised him to eat a better diet.    We reviewed with patient the CT scans from 10/30/19 which was stable and negative for metastasis.    Genetics test was negative.    Patient's labs from 12/16/19 are normal.    Oncology history:  Patient is a 48-year-old gentleman who has had history of bright red blood per rectum since last several months.  It was going on for 4 months.  In the past he had similar symptoms and had found a stomach ulcer and was placed on Protonix at that time.  But this time he noted and went to work.  HEENT at work he started getting dizzy and then was taken to the emergency room by his wife.  He had lost 10-15 pounds in the past 4 months but he had a tendency to lose and gain.  He also felt fatigued.    In the emergency room he had a CT abdomen pelvis and that showed extensive fatty liver all of the other organs were unremarkable.  There was no evidence of any acute inflammation or abnormal enhancement.  He had history of having had a commode full of blood at work prior to his ER visit.  The emergency room discharged him with a follow-up and he was seen by Dr. Murphy.    Patient was found to have a 7 mm polyp  in the descending colon and also a 10 mm polyp in the sigmoid colon which were resected.  He had a fungating non-obstructing medium-sized mass in the proximal rectum.  The mass was circumferential partially involving one half of the lumen circumference.  It was 3 cm in length and in diameter it was 21 mm.  A biopsy was done and it was consistent with moderately differentiated adenocarcinoma of the rectum.  It was thought to be in the upper rectum or proximal rectum.  He also had normal EGD which showed normal esophagus his gastric antrum was mildly erythematous which is biopsied and he had a normal examined duodenum.    On the pathology the colon polyp in the descending colon and the sigmoid colon were all tubular adenomas but the biopsy of the rectum showed moderately differentiated adenocarcinoma.  The antral biopsy was negative and there was no Helicobacter pylori.  Patient underwent MRI which also showed the mass to be in the high rectum and patient was seen by Dr. Gardner and a low anterior resection was done.    Pathology showed invasive moderately differentiated adenocarcinoma 2.5 x 2.3 x 0.5 cm and tumor invades the muscularis propria.  It is grade 2.  There is no lymphovascular space invasion or perineural invasion identified.  Margins were all clear.  0 of 12 lymph nodes were involved.  He has a T2 N0 moderately differentiated adenocarcinoma of the upper rectum.    A portion of the tumor with adjacent normal tissue was sent for MSI and BRAF testing and it is pending.  Multiple sections of the tumor show invasive moderately differentiated adenocarcinoma invading the submucosa and into the muscularis propria.  It does not invade through the muscularis propria in the reviewed sections.  No definite lymphovascular space or perineural invasion seen.    CEA was 4.97 as of February 20, 2019 and this was prior to surgery.    He has been referred here to see further options of treatment.    His father has had a  "history of colon cancer at age 47.    MRI spine done on 04/18/2019  IMPRESSION:   No evidence for metastatic disease to the thoracic spine. The previously  noted lucent lesions within the T2 and T4 vertebrae are compatible with  Hemangiomas.    NM bone scan done on 04/01/2019:  IMPRESSION:  Scattered foci of radiotracer activity in the spine is  consistent with degenerative change. There is no scintigraphic evidence  of osseous metastasis.    CT neck, chest and abdomen pelvis done on 04/03/2019:  IMPRESSION:  1. Status post rectal resection.  2. Punctate nonobstructing stone in each kidney.  3. Fatty infiltration of the liver.  4. Two tiny smoothly marginated lucencies in the approximate T3 and T5  vertebrae. These are nonspecific and could represent small benign  lesions such as small hemangiomas. The possibility of 1 or more tiny  metastatic lesions is not excluded, but thought to be less likely. If  further evaluation is clinically indicated, MRI of the thoracic spine or  correlation with a short-term follow-up CT chest may be helpful.  5. No other definite evidence of metastatic disease.    06/12/19: Genetics testing negative.    Past Medical History:   Diagnosis Date   • Abdominal pain    • Allergic    • Allergic rhinitis    • Arthritis    • Colon cancer (CMS/HCC) 02/20/2019    Invasive moderately differentiated adenocarcinoma   • Colon polyps    • Diverticulosis    • Dry mouth    • Fatty liver 02/07/2019   • Gas pain    • H/O Acute GI bleeding 2015   • Insomnia     \"nerves\"   • Kidney stones     in past   • Migraines    • Rectal bleeding    • Renal disorder    • Stiff neck     holding neck still to avoid dizziness   • Syncope    • Tattoos    • Ulcer of abdomen wall (CMS/HCC) 2013        Past Surgical History:   Procedure Laterality Date   • APPENDECTOMY N/A    • COLON RESECTION N/A 3/1/2019    Procedure: COLON RESECTION LAPAROSCOPIC LOW ANTERIOR, flexible sigmoidoscopy;  Surgeon: Matthew Gardner MD;  " Location: [facility] MAIN OR;  Service: General   • COLONOSCOPY N/A 2/20/2019    Procedure: COLONOSCOPY with Hot and Cold Polypectomy and Biopsies;  Surgeon: Nino Murphy MD;  Location: [facility] ENDOSCOPY;  Service: Gastroenterology   • CYSTOSCOPY W/ URETERAL STENT PLACEMENT  06/04/2016    Dr. Teddy Kaba   • ENDOSCOPY N/A 2/20/2019    Procedure: ESOPHAGOGASTRODUODENOSCOPY with Biopsies;  Surgeon: Nino Murphy MD;  Location: [facility] ENDOSCOPY;  Service: Gastroenterology   • ENDOSCOPY AND COLONOSCOPY N/A 04/23/2015    tubular adenoma rectal polyp   • KNEE ARTHROSCOPY WITH OPEN LIGAMENT REPAIR Bilateral 1980, 1994    right knee: 1980, left knee: 1994   • ORIF TIBIA/FIBULA FRACTURES Left 1980's   • SHOULDER SURGERY Bilateral     Bicep repair on left, broken shoulder on right    • WISDOM TOOTH EXTRACTION Bilateral         Current Outpatient Medications on File Prior to Visit   Medication Sig Dispense Refill   • acetaminophen (TYLENOL) 500 MG tablet Take 500 mg by mouth Every 6 (Six) Hours As Needed for Mild Pain .     • Aspirin-Acetaminophen-Caffeine (EXCEDRIN MIGRAINE PO) Take  by mouth As Needed.     • cyclobenzaprine (FLEXERIL) 5 MG tablet Take 1 tablet by mouth Every 8 (Eight) Hours As Needed for Muscle Spasms. 30 tablet 1   • diphenhydrAMINE (BENADRYL) 50 MG capsule Take 50 mg by mouth Every 6 (Six) Hours As Needed for Itching.     • doxycycline (VIBRAMYCIN) 100 MG capsule Take 1 capsule by mouth 2 (Two) Times a Day. 20 capsule 0   • Multiple Vitamins-Minerals (MULTIVITAMIN ADULT PO) Take 1 tablet by mouth Daily.     • albuterol sulfate  (90 Base) MCG/ACT inhaler Inhale 2 puffs Every 4 (Four) Hours As Needed for Wheezing. 2 puffs every 4 hours when necessary dyspnea 1 inhaler 0   • meloxicam (MOBIC) 15 MG tablet Take 1 tablet by mouth Daily. With food (Patient taking differently: Take 15 mg by mouth As Needed. With food) 30 tablet 1     Current Facility-Administered Medications on File Prior to  Visit   Medication Dose Route Frequency Provider Last Rate Last Dose   • diphenhydrAMINE (BENADRYL) capsule 50 mg  50 mg Oral Once Jsoe Roberto Martin MD            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     Employer: [employer]   Tobacco Use   • Smoking status: Light Tobacco Smoker     Types: Cigars   • Smokeless tobacco: Never Used   • Tobacco comment: a few cigars a year    Substance and Sexual Activity   • Alcohol use: Yes     Comment: social, not daily   • Drug use: No   • Sexual activity: Yes        Family History   Problem Relation Age of Onset   • Heart attack Mother    • Heart disease Mother    • Heart failure Mother    • Colon cancer Father 47   • Colon polyps Father    • Heart disease Father    • Diabetes Father    • Heart failure Father    • Malig Hyperthermia Neg Hx         Review of Systems   Constitutional: Positive for fatigue (12/16/19-Unchanged). Negative for chills, diaphoresis, fever and unexpected weight change.   HENT: Negative for hearing loss, sore throat and trouble swallowing.    Respiratory: Positive for shortness of breath (12/16/19-Unchanged). Negative for chest tightness and wheezing.    Cardiovascular: Negative for chest pain, palpitations and leg swelling.   Gastrointestinal: Positive for abdominal pain (12/16/19-Unchanged) and constipation (12/16/19-Unchanged). Negative for abdominal distention, nausea and vomiting.   Genitourinary: Positive for frequency (Urine dark and strong  "smell.12/16/19-Unchanged). Negative for dysuria, hematuria and urgency.        Burning with urination   Musculoskeletal: Positive for back pain (12/16/19-Unchanged). Negative for joint swelling.        No muscle weakness.   Skin: Negative for rash and wound.   Neurological: Positive for weakness (12/16/19-Unchanged) and headaches (12/16/19-Improved). Negative for seizures, syncope, speech difficulty and numbness.   Hematological: Negative for adenopathy. Does not bruise/bleed easily.   Psychiatric/Behavioral: Negative for behavioral problems, confusion and suicidal ideas.        Objective      Vitals:    12/16/19 1032   BP: 158/99   Pulse: 67   Resp: 18   Temp: 97.8 °F (36.6 °C)   TempSrc: Oral   SpO2: 99%   Weight: 92.4 kg (203 lb 11.2 oz)   Height: 175.3 cm (69.02\")   PainSc: 0-No pain     Current Status 12/16/2019   ECOG score 0       Physical Exam    GENERAL:  Well-developed, well-nourished in no acute distress.   NECK:  Supple with good range of motion; no thyromegaly or masses, no JVD.  LYMPHATICS:  No cervical, supraclavicular, axillary or inguinal adenopathy.  CHEST:  Lungs clear to auscultation. Good airflow.  CARDIAC:  Regular rate and rhythm without murmurs, rubs or gallops. Normal S1,S2.  ABDOMEN:  Soft, nontender with no hepatosplenomegaly or masses.  EXTREMITIES:  No clubbing, cyanosis or edema.  NEUROLOGICAL:  Cranial Nerves II-XII grossly intact.  No focal neurological deficits.  PSYCHIATRIC:  Normal affect and mood.          RECENT LABS:  Hematology WBC   Date Value Ref Range Status   12/16/2019 6.46 3.40 - 10.80 10*3/mm3 Final     RBC   Date Value Ref Range Status   12/16/2019 5.32 4.14 - 5.80 10*6/mm3 Final     Hemoglobin   Date Value Ref Range Status   12/16/2019 16.8 13.0 - 17.7 g/dL Final     Hematocrit   Date Value Ref Range Status   12/16/2019 48.7 37.5 - 51.0 % Final     Platelets   Date Value Ref Range Status   12/16/2019 255 140 - 450 10*3/mm3 Final      RECENT IMAGING:   10/30/19 - CT " Chest Abdomen Pelvis  IMPRESSION:  Stable examination. There is no evidence for metastatic  disease and there is no new abnormality.    06/25/19 - Genetics Scan  Negative    Assessment/Plan    1.  T2 N0 moderately differentiated adenocarcinoma of the upper rectum, newly diagnosed status post low anterior resection by Dr. Gardner.  Patient initially presented with bright red blood per rectum.  This was lasting 4-5 months.  He then underwent EGD colonoscopy by Dr. Murphy.  EGD was negative.  But the colonoscopy showed evidence of an lesion in the upper rectum which was 3 x 2.1 cm and it was  biopsied and consistent with moderately differentiated adenocarcinoma.  Patient was referred to Dr. Gardner and his CEA was 4.97 prior to surgery.  Patient then underwent EGD as well which was negative.  The 2 polyps in the descending colon were negative.    Patient underwent low anterior resection which on pathology shows a 2.5 cm mass extending on into the submucosa with margins clear and not involving the lymphovascular or perineural invasion.  There is 0 out of 12 lymph nodes positive.  And the tumor was situated in the upper rectum.  · Reviewed CT scan which is negative except 2 lucent lesions on T3 and T5, suggested MRI of the thoracic spine  · MRI done on 04/18/19 is normal without evidence of malignancy and lesion on T3 and T5 are hemangioma's.   · Bone scan is negative  · Followed up with observation with CT scans in 6 months.   · CT scans from 10/30/19 are negative.    2.  Family history of colon cancer with father having colon cancer.  Patient is to follow up with  on June 6th, 2019    Plan   1. Colonoscopy due in March 2020.  Refer to Dr. Gardner in March 2020  2. Genetics test was negative.  3. Encouraged patient on good diet and regular exercise.   4. Follow up with Pedro Pablo in 6 months with labs, CT scans 1 week prior    ISandy, acted as scribe for Magalis Chamorro MD  in documenting the service or  procedure. To the best of my knowledge, I recorded what was dictated by MD Magalis Llanes MD  12/16/19    CC: Dr. Matthew Roa MD

## 2019-12-23 ENCOUNTER — TELEPHONE (OUTPATIENT)
Dept: FAMILY MEDICINE CLINIC | Facility: CLINIC | Age: 49
End: 2019-12-23

## 2019-12-23 NOTE — TELEPHONE ENCOUNTER
Pt returned call and left me a message. Called pt back, n/a, left message saying it has not been resulted yet.

## 2020-03-16 ENCOUNTER — OFFICE VISIT (OUTPATIENT)
Dept: FAMILY MEDICINE CLINIC | Facility: CLINIC | Age: 50
End: 2020-03-16

## 2020-03-16 VITALS
HEIGHT: 69 IN | SYSTOLIC BLOOD PRESSURE: 112 MMHG | OXYGEN SATURATION: 98 % | DIASTOLIC BLOOD PRESSURE: 84 MMHG | TEMPERATURE: 97.9 F | BODY MASS INDEX: 30.72 KG/M2 | HEART RATE: 67 BPM | WEIGHT: 207.4 LBS

## 2020-03-16 DIAGNOSIS — M54.50 LUMBAR PAIN: Primary | ICD-10-CM

## 2020-03-16 PROCEDURE — 99213 OFFICE O/P EST LOW 20 MIN: CPT | Performed by: FAMILY MEDICINE

## 2020-03-16 RX ORDER — CYCLOBENZAPRINE HCL 5 MG
5 TABLET ORAL EVERY 8 HOURS PRN
Qty: 30 TABLET | Refills: 1 | Status: SHIPPED | OUTPATIENT
Start: 2020-03-16 | End: 2020-07-09 | Stop reason: SDUPTHER

## 2020-03-16 NOTE — PROGRESS NOTES
SUBJECTIVE:  The patient is a 49-year-old male.  He is primarily here for back pain.  He gets FMLA.  This is due.  He works at [employer].  His pain is present currently.  He was treated for bronchitis in December.  He still has a little bit of cough but is not sick.  No fever or chills.    PAST MEDICAL HISTORY:  Reviewed.    REVIEW OF SYSTEMS:  Please see above; all others reviewed and are negative.      OBJECTIVE:   Vitals signs are reviewed and are stable.    General:  Well-nourished.  Alert and oriented x3 in no acute distress.  HEENT: PERRLA.   Neck:  Supple.   Lungs:  Clear.  Equal bilateral expansion  Heart:  Regular rate and rhythm.   Abdomen:   Soft, nontender.   Extremities:  No cyanosis, clubbing or edema.   Neck: Mild tenderness in the lumbar region.  Pain with flexion extension.  Straight leg raise is negative.  Neurological:  Grossly intact without motor or sensory deficits.     ASSESSMENT:    Lumbar pain    PLAN: FMLA forms provided.  We discussed the results of his stress test which was deemed low risk.  He will take anti-inflammatories and muscle relaxants as needed.  He will call me if any significant problems should occur.  He is up-to-date on his colonoscopy.    Dictated utilizing Dragon dictation.

## 2020-06-15 ENCOUNTER — HOSPITAL ENCOUNTER (OUTPATIENT)
Dept: PET IMAGING | Facility: HOSPITAL | Age: 50
End: 2020-06-15

## 2020-06-17 ENCOUNTER — LAB (OUTPATIENT)
Dept: LAB | Facility: HOSPITAL | Age: 50
End: 2020-06-17

## 2020-06-17 ENCOUNTER — HOSPITAL ENCOUNTER (OUTPATIENT)
Dept: PET IMAGING | Facility: HOSPITAL | Age: 50
Discharge: HOME | End: 2020-06-17
Admitting: INTERNAL MEDICINE

## 2020-06-17 DIAGNOSIS — C20 RECTAL CANCER (HCC): ICD-10-CM

## 2020-06-17 LAB
ALBUMIN SERPL-MCNC: 4.3 G/DL (ref 3.5–5.2)
ALBUMIN/GLOB SERPL: 1.5 G/DL (ref 1.1–2.4)
ALP SERPL-CCNC: 78 U/L (ref 38–116)
ALT SERPL W P-5'-P-CCNC: 18 U/L (ref 0–41)
ANION GAP SERPL CALCULATED.3IONS-SCNC: 12.2 MMOL/L (ref 5–15)
AST SERPL-CCNC: 23 U/L (ref 0–40)
BASOPHILS # BLD AUTO: 0.07 10*3/MM3 (ref 0–0.2)
BASOPHILS NFR BLD AUTO: 1.2 % (ref 0–1.5)
BILIRUB SERPL-MCNC: 0.6 MG/DL (ref 0.2–1.2)
BUN BLD-MCNC: 14 MG/DL (ref 6–20)
BUN/CREAT SERPL: 14.1 (ref 7.3–30)
CALCIUM SPEC-SCNC: 9.1 MG/DL (ref 8.5–10.2)
CEA SERPL-MCNC: 5.35 NG/ML
CHLORIDE SERPL-SCNC: 101 MMOL/L (ref 98–107)
CO2 SERPL-SCNC: 25.8 MMOL/L (ref 22–29)
CREAT BLD-MCNC: 0.99 MG/DL (ref 0.7–1.3)
DEPRECATED RDW RBC AUTO: 40.5 FL (ref 37–54)
EOSINOPHIL # BLD AUTO: 0.31 10*3/MM3 (ref 0–0.4)
EOSINOPHIL NFR BLD AUTO: 5.5 % (ref 0.3–6.2)
ERYTHROCYTE [DISTWIDTH] IN BLOOD BY AUTOMATED COUNT: 11.7 % (ref 12.3–15.4)
GFR SERPL CREATININE-BSD FRML MDRD: 80 ML/MIN/1.73
GLOBULIN UR ELPH-MCNC: 2.8 GM/DL (ref 1.8–3.5)
GLUCOSE BLD-MCNC: 73 MG/DL (ref 74–124)
HCT VFR BLD AUTO: 48.7 % (ref 37.5–51)
HGB BLD-MCNC: 16.5 G/DL (ref 13–17.7)
IMM GRANULOCYTES # BLD AUTO: 0.03 10*3/MM3 (ref 0–0.05)
IMM GRANULOCYTES NFR BLD AUTO: 0.5 % (ref 0–0.5)
LYMPHOCYTES # BLD AUTO: 1.34 10*3/MM3 (ref 0.7–3.1)
LYMPHOCYTES NFR BLD AUTO: 23.9 % (ref 19.6–45.3)
MCH RBC QN AUTO: 32.2 PG (ref 26.6–33)
MCHC RBC AUTO-ENTMCNC: 33.9 G/DL (ref 31.5–35.7)
MCV RBC AUTO: 95.1 FL (ref 79–97)
MONOCYTES # BLD AUTO: 0.7 10*3/MM3 (ref 0.1–0.9)
MONOCYTES NFR BLD AUTO: 12.5 % (ref 5–12)
NEUTROPHILS # BLD AUTO: 3.16 10*3/MM3 (ref 1.7–7)
NEUTROPHILS NFR BLD AUTO: 56.4 % (ref 42.7–76)
NRBC BLD AUTO-RTO: 0 /100 WBC (ref 0–0.2)
PLATELET # BLD AUTO: 257 10*3/MM3 (ref 140–450)
PMV BLD AUTO: 9.9 FL (ref 6–12)
POTASSIUM BLD-SCNC: 4.1 MMOL/L (ref 3.5–4.7)
PROT SERPL-MCNC: 7.1 G/DL (ref 6.3–8)
RBC # BLD AUTO: 5.12 10*6/MM3 (ref 4.14–5.8)
SODIUM BLD-SCNC: 139 MMOL/L (ref 134–145)
WBC NRBC COR # BLD: 5.61 10*3/MM3 (ref 3.4–10.8)

## 2020-06-17 PROCEDURE — 74176 CT ABD & PELVIS W/O CONTRAST: CPT

## 2020-06-17 PROCEDURE — 80053 COMPREHEN METABOLIC PANEL: CPT

## 2020-06-17 PROCEDURE — 71250 CT THORAX DX C-: CPT

## 2020-06-17 PROCEDURE — 82378 CARCINOEMBRYONIC ANTIGEN: CPT | Performed by: INTERNAL MEDICINE

## 2020-06-17 PROCEDURE — 70490 CT SOFT TISSUE NECK W/O DYE: CPT

## 2020-06-17 PROCEDURE — 85025 COMPLETE CBC W/AUTO DIFF WBC: CPT

## 2020-06-17 PROCEDURE — 0 DIATRIZOATE MEGLUMINE & SODIUM PER 1 ML: Performed by: INTERNAL MEDICINE

## 2020-06-17 PROCEDURE — 36415 COLL VENOUS BLD VENIPUNCTURE: CPT

## 2020-06-17 RX ADMIN — DIATRIZOATE MEGLUMINE AND DIATRIZOATE SODIUM 30 ML: 660; 100 LIQUID ORAL; RECTAL at 10:25

## 2020-06-18 ENCOUNTER — APPOINTMENT (OUTPATIENT)
Dept: LAB | Facility: HOSPITAL | Age: 50
End: 2020-06-18

## 2020-06-18 ENCOUNTER — OFFICE VISIT (OUTPATIENT)
Dept: ONCOLOGY | Facility: CLINIC | Age: 50
End: 2020-06-18

## 2020-06-18 VITALS
WEIGHT: 208.9 LBS | DIASTOLIC BLOOD PRESSURE: 82 MMHG | BODY MASS INDEX: 30.94 KG/M2 | SYSTOLIC BLOOD PRESSURE: 122 MMHG | RESPIRATION RATE: 18 BRPM | HEIGHT: 69 IN | HEART RATE: 61 BPM | TEMPERATURE: 99.3 F | OXYGEN SATURATION: 92 %

## 2020-06-18 DIAGNOSIS — C20 RECTAL CANCER (HCC): Primary | ICD-10-CM

## 2020-06-18 PROCEDURE — 99213 OFFICE O/P EST LOW 20 MIN: CPT | Performed by: INTERNAL MEDICINE

## 2020-06-18 NOTE — PROGRESS NOTES
Subjective     REASON FOR FOLLOW-UP:   1. 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 Fatty liver    3. 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 Genetics testing negative.    History of Present Illness   Patient with T2N0 adenocarcinoma of the upper rectum status post low anterior resection here for follow-up.  Given T2 N0 tumor, no role for chemoradiation. Followed by observation with imaging and labs.     He reports feeling well overall.  He met with Dr. Roa who had ordered a test to check for a weak heart.  He will follow up with him soon.  His surgery was on 03/01/19 with Dr. Gardner.  He has been eating well, but his diet consists of mostly fried foods.  I have advised him to eat a better diet.    We reviewed with patient the CT scans from 10/30/19 which was stable and negative for metastasis.    Genetics test was negative.    Interval history: Patient is doing well.  He is exercising a lot and trying to reduce weight.  He has not had a colonoscopy yet by Dr. Murphy.  He complains of mild epigastric discomfort.    Oncology history:  Patient is a 48-year-old gentleman who has had history of bright red blood per rectum since last several months.  It was going on for 4 months.  In the past he had similar symptoms and had found a stomach ulcer and was placed on Protonix at that time.  But this time he noted and went to work.  HEENT at work he started getting dizzy and then was taken to the emergency room by his wife.  He had lost 10-15 pounds in the past 4 months but he had a tendency to lose and gain.  He also felt fatigued.    In the emergency room he had a CT abdomen pelvis and that showed extensive fatty liver all of the other organs were unremarkable.  There was no evidence of any acute inflammation or abnormal enhancement.  He had history of having had a commode full of blood at work  prior to his ER visit.  The emergency room discharged him with a follow-up and he was seen by Dr. Murphy.    Patient was found to have a 7 mm polyp in the descending colon and also a 10 mm polyp in the sigmoid colon which were resected.  He had a fungating non-obstructing medium-sized mass in the proximal rectum.  The mass was circumferential partially involving one half of the lumen circumference.  It was 3 cm in length and in diameter it was 21 mm.  A biopsy was done and it was consistent with moderately differentiated adenocarcinoma of the rectum.  It was thought to be in the upper rectum or proximal rectum.  He also had normal EGD which showed normal esophagus his gastric antrum was mildly erythematous which is biopsied and he had a normal examined duodenum.    On the pathology the colon polyp in the descending colon and the sigmoid colon were all tubular adenomas but the biopsy of the rectum showed moderately differentiated adenocarcinoma.  The antral biopsy was negative and there was no Helicobacter pylori.  Patient underwent MRI which also showed the mass to be in the high rectum and patient was seen by Dr. Gardner and a low anterior resection was done.    Pathology showed invasive moderately differentiated adenocarcinoma 2.5 x 2.3 x 0.5 cm and tumor invades the muscularis propria.  It is grade 2.  There is no lymphovascular space invasion or perineural invasion identified.  Margins were all clear.  0 of 12 lymph nodes were involved.  He has a T2 N0 moderately differentiated adenocarcinoma of the upper rectum.    A portion of the tumor with adjacent normal tissue was sent for MSI and BRAF testing and it is pending.  Multiple sections of the tumor show invasive moderately differentiated adenocarcinoma invading the submucosa and into the muscularis propria.  It does not invade through the muscularis propria in the reviewed sections.  No definite lymphovascular space or perineural invasion seen.    CEA was 4.97  "as of February 20, 2019 and this was prior to surgery.    He has been referred here to see further options of treatment.    His father has had a history of colon cancer at age 47.    MRI spine done on 04/18/2019  IMPRESSION:   No evidence for metastatic disease to the thoracic spine. The previously  noted lucent lesions within the T2 and T4 vertebrae are compatible with  Hemangiomas.    NM bone scan done on 04/01/2019:  IMPRESSION:  Scattered foci of radiotracer activity in the spine is  consistent with degenerative change. There is no scintigraphic evidence  of osseous metastasis.    CT neck, chest and abdomen pelvis done on 04/03/2019:  IMPRESSION:  1. Status post rectal resection.  2. Punctate nonobstructing stone in each kidney.  3. Fatty infiltration of the liver.  4. Two tiny smoothly marginated lucencies in the approximate T3 and T5  vertebrae. These are nonspecific and could represent small benign  lesions such as small hemangiomas. The possibility of 1 or more tiny  metastatic lesions is not excluded, but thought to be less likely. If  further evaluation is clinically indicated, MRI of the thoracic spine or  correlation with a short-term follow-up CT chest may be helpful.  5. No other definite evidence of metastatic disease.    06/12/19: Genetics testing negative.    Past Medical History:   Diagnosis Date   • Abdominal pain    • Allergic    • Allergic rhinitis    • Arthritis    • Colon cancer (CMS/HCC) 02/20/2019    Invasive moderately differentiated adenocarcinoma   • Colon polyps    • Diverticulosis    • Dry mouth    • Fatty liver 02/07/2019   • Gas pain    • H/O Acute GI bleeding 2015   • Insomnia     \"nerves\"   • Kidney stones     in past   • Migraines    • Rectal bleeding    • Renal disorder    • Stiff neck     holding neck still to avoid dizziness   • Syncope    • Tattoos    • Ulcer of abdomen wall (CMS/HCC) 2013        Past Surgical History:   Procedure Laterality Date   • APPENDECTOMY N/A    • COLON " RESECTION N/A 3/1/2019    Procedure: COLON RESECTION LAPAROSCOPIC LOW ANTERIOR, flexible sigmoidoscopy;  Surgeon: Matthew Gardner MD;  Location: [facility] MAIN OR;  Service: General   • COLONOSCOPY N/A 2/20/2019    Procedure: COLONOSCOPY with Hot and Cold Polypectomy and Biopsies;  Surgeon: Nino Murphy MD;  Location: [facility] ENDOSCOPY;  Service: Gastroenterology   • CYSTOSCOPY W/ URETERAL STENT PLACEMENT  06/04/2016    Dr. Teddy Kaba   • ENDOSCOPY N/A 2/20/2019    Procedure: ESOPHAGOGASTRODUODENOSCOPY with Biopsies;  Surgeon: Nino Murphy MD;  Location: [facility] ENDOSCOPY;  Service: Gastroenterology   • ENDOSCOPY AND COLONOSCOPY N/A 04/23/2015    tubular adenoma rectal polyp   • KNEE ARTHROSCOPY WITH OPEN LIGAMENT REPAIR Bilateral 1980, 1994    right knee: 1980, left knee: 1994   • ORIF TIBIA/FIBULA FRACTURES Left 1980's   • SHOULDER SURGERY Bilateral     Bicep repair on left, broken shoulder on right    • WISDOM TOOTH EXTRACTION Bilateral         Current Outpatient Medications on File Prior to Visit   Medication Sig Dispense Refill   • acetaminophen (TYLENOL) 500 MG tablet Take 500 mg by mouth Every 6 (Six) Hours As Needed for Mild Pain .     • cyclobenzaprine (FLEXERIL) 5 MG tablet Take 1 tablet by mouth Every 8 (Eight) Hours As Needed for Muscle Spasms. 30 tablet 1   • diphenhydrAMINE (BENADRYL) 50 MG capsule Take 50 mg by mouth Every 6 (Six) Hours As Needed for Itching.     • Multiple Vitamins-Minerals (MULTIVITAMIN ADULT PO) Take 1 tablet by mouth Daily.     • Aspirin-Acetaminophen-Caffeine (EXCEDRIN MIGRAINE PO) Take  by mouth As Needed.       No current facility-administered medications on file prior to visit.         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     Employer: [employer]   Tobacco Use   • Smoking status: Former Smoker     Types: Cigars   • Smokeless tobacco: Never Used   • Tobacco comment: a few cigars a year    Substance and Sexual Activity   • Alcohol use: Yes     Comment: social, not daily   • Drug use: No   • Sexual activity: Yes        Family History   Problem Relation Age of Onset   • Heart attack Mother    • Heart disease Mother    • Heart failure Mother    • Colon cancer Father 47   • Colon polyps Father    • Heart disease Father    • Diabetes Father    • Heart failure Father    • Malig Hyperthermia Neg Hx         Review of Systems   Constitutional: Positive for fatigue (06/18/20-Unchanged). Negative for appetite change, chills, diaphoresis, fever and unexpected weight change.   HENT: Negative for hearing loss, sore throat and trouble swallowing.    Respiratory: Positive for shortness of breath (06/18/20-Improved). Negative for cough, chest tightness and wheezing.    Cardiovascular: Negative for chest pain, palpitations and leg swelling.   Gastrointestinal: Positive for abdominal pain (Lactose intolerant-06/18/20-Unchanged) and constipation (06/18/20-Unchanged). Negative for abdominal distention, diarrhea, nausea and vomiting.   Genitourinary: Positive for frequency (Urine dark and strong smell.06/18/20-Unchanged). Negative for dysuria, hematuria and urgency.        Burning with urination   Musculoskeletal: Positive for back pain (06/18/20-Unchanged). Negative for joint swelling.        No muscle weakness.   Skin: Negative for rash and wound.   Neurological: Positive for weakness (06/18/20-Improved) and headaches (06/18/20-Unchanged). Negative for seizures, syncope, speech difficulty and  "numbness.   Hematological: Negative for adenopathy. Does not bruise/bleed easily.   Psychiatric/Behavioral: Negative for behavioral problems, confusion and suicidal ideas.        Objective      Vitals:    06/18/20 1029   BP: 122/82   Pulse: 61   Resp: 18   Temp: 99.3 °F (37.4 °C)   TempSrc: Skin   SpO2: 92%   Weight: 94.8 kg (208 lb 14.4 oz)   Height: 175.3 cm (69.02\")   PainSc: 0-No pain     Current Status 6/18/2020   ECOG score 0       Physical Exam    GENERAL:  Well-developed, well-nourished in no acute distress.   NECK:  Supple with good range of motion; no thyromegaly or masses, no JVD.  LYMPHATICS:  No cervical, supraclavicular, axillary or inguinal adenopathy.  CHEST:  Lungs clear to auscultation. Good airflow.  CARDIAC:  Regular rate and rhythm without murmurs, rubs or gallops. Normal S1,S2.  ABDOMEN:  Soft, nontender with no hepatosplenomegaly or masses.  EXTREMITIES:  No clubbing, cyanosis or edema.  NEUROLOGICAL:  Cranial Nerves II-XII grossly intact.  No focal neurological deficits.  PSYCHIATRIC:  Normal affect and mood.          RECENT LABS:  Hematology WBC   Date Value Ref Range Status   06/17/2020 5.61 3.40 - 10.80 10*3/mm3 Final     RBC   Date Value Ref Range Status   06/17/2020 5.12 4.14 - 5.80 10*6/mm3 Final     Hemoglobin   Date Value Ref Range Status   06/17/2020 16.5 13.0 - 17.7 g/dL Final     Hematocrit   Date Value Ref Range Status   06/17/2020 48.7 37.5 - 51.0 % Final     Platelets   Date Value Ref Range Status   06/17/2020 257 140 - 450 10*3/mm3 Final      CT NECK, CHEST, ABDOMEN, AND PELVIS WITH IV CONTRAST 06/17/20  IMPRESSION:  There is no evidence for metastatic disease and there is no  new abnormality.    RECENT IMAGING:   10/30/19 - CT Chest Abdomen Pelvis  IMPRESSION:  Stable examination. There is no evidence for metastatic  disease and there is no new abnormality.    06/25/19 - Genetics Scan  Negative    Assessment/Plan    1.  T2 N0 moderately differentiated adenocarcinoma of the " upper rectum, newly diagnosed status post low anterior resection by Dr. Gardner.  Patient initially presented with bright red blood per rectum.  This was lasting 4-5 months.  He then underwent EGD colonoscopy by Dr. Murphy.  EGD was negative.  But the colonoscopy showed evidence of an lesion in the upper rectum which was 3 x 2.1 cm and it was  biopsied and consistent with moderately differentiated adenocarcinoma.  Patient was referred to Dr. Gardner and his CEA was 4.97 prior to surgery.  Patient then underwent EGD as well which was negative.  The 2 polyps in the descending colon were negative.    Patient underwent low anterior resection which on pathology shows a 2.5 cm mass extending on into the submucosa with margins clear and not involving the lymphovascular or perineural invasion.  There is 0 out of 12 lymph nodes positive.  And the tumor was situated in the upper rectum.  · Reviewed CT scan which is negative except 2 lucent lesions on T3 and T5, suggested MRI of the thoracic spine  · MRI done on 04/18/19 is normal without evidence of malignancy and lesion on T3 and T5 are hemangioma's.   · Bone scan is negative  · Followed up with observation with CT scans in 6 months.   · CT scans from 10/30/19 are negative.    2.  Family history of colon cancer with father having colon cancer.  Patient is to follow up with  on June 6th, 2019    Plan   1.   2. Genetics test was negative.  3. Encouraged patient on good diet and regular exercise.   4. Reviewed recent CT scan which is negative  5. Refer patient to Dr. Murphy for EGD colonoscopy  6. Follow up with Pedro Pablo in 6 months with labs, CT scans 1 week prior    I, Sandy Shrestha, acted as scribe for Magalis Chamroro MD  in documenting the service or procedure. To the best of my knowledge, I recorded what was dictated by MD Magalis Llanes MD  12/16/19    CC: Dr. Matthew Roa MD

## 2020-07-07 ENCOUNTER — APPOINTMENT (OUTPATIENT)
Dept: GENERAL RADIOLOGY | Facility: HOSPITAL | Age: 50
End: 2020-07-07

## 2020-07-07 ENCOUNTER — HOSPITAL ENCOUNTER (EMERGENCY)
Facility: HOSPITAL | Age: 50
Discharge: HOME | End: 2020-07-07
Attending: EMERGENCY MEDICINE | Admitting: EMERGENCY MEDICINE

## 2020-07-07 VITALS
SYSTOLIC BLOOD PRESSURE: 128 MMHG | DIASTOLIC BLOOD PRESSURE: 91 MMHG | HEART RATE: 64 BPM | OXYGEN SATURATION: 96 % | RESPIRATION RATE: 16 BRPM | TEMPERATURE: 97.3 F

## 2020-07-07 DIAGNOSIS — R07.89 ATYPICAL CHEST PAIN: Primary | ICD-10-CM

## 2020-07-07 LAB
ALBUMIN SERPL-MCNC: 4.2 G/DL (ref 3.5–5.2)
ALBUMIN/GLOB SERPL: 1.8 G/DL
ALP SERPL-CCNC: 74 U/L (ref 39–117)
ALT SERPL W P-5'-P-CCNC: 22 U/L (ref 1–41)
ANION GAP SERPL CALCULATED.3IONS-SCNC: 9.6 MMOL/L (ref 5–15)
AST SERPL-CCNC: 23 U/L (ref 1–40)
BASOPHILS # BLD AUTO: 0.04 10*3/MM3 (ref 0–0.2)
BASOPHILS NFR BLD AUTO: 0.6 % (ref 0–1.5)
BILIRUB SERPL-MCNC: 0.7 MG/DL (ref 0–1.2)
BUN SERPL-MCNC: 13 MG/DL (ref 6–20)
BUN/CREAT SERPL: 12.5 (ref 7–25)
CALCIUM SPEC-SCNC: 9.1 MG/DL (ref 8.6–10.5)
CHLORIDE SERPL-SCNC: 104 MMOL/L (ref 98–107)
CO2 SERPL-SCNC: 24.4 MMOL/L (ref 22–29)
CREAT SERPL-MCNC: 1.04 MG/DL (ref 0.76–1.27)
D DIMER PPP FEU-MCNC: 0.28 MCGFEU/ML (ref 0–0.49)
DEPRECATED RDW RBC AUTO: 44.1 FL (ref 37–54)
EOSINOPHIL # BLD AUTO: 0.31 10*3/MM3 (ref 0–0.4)
EOSINOPHIL NFR BLD AUTO: 4.7 % (ref 0.3–6.2)
ERYTHROCYTE [DISTWIDTH] IN BLOOD BY AUTOMATED COUNT: 12.8 % (ref 12.3–15.4)
GFR SERPL CREATININE-BSD FRML MDRD: 76 ML/MIN/1.73
GLOBULIN UR ELPH-MCNC: 2.4 GM/DL
GLUCOSE SERPL-MCNC: 92 MG/DL (ref 65–99)
HCT VFR BLD AUTO: 48.7 % (ref 37.5–51)
HGB BLD-MCNC: 16.8 G/DL (ref 13–17.7)
HOLD SPECIMEN: NORMAL
HOLD SPECIMEN: NORMAL
IMM GRANULOCYTES # BLD AUTO: 0.03 10*3/MM3 (ref 0–0.05)
IMM GRANULOCYTES NFR BLD AUTO: 0.5 % (ref 0–0.5)
LYMPHOCYTES # BLD AUTO: 1.79 10*3/MM3 (ref 0.7–3.1)
LYMPHOCYTES NFR BLD AUTO: 27 % (ref 19.6–45.3)
MCH RBC QN AUTO: 32.2 PG (ref 26.6–33)
MCHC RBC AUTO-ENTMCNC: 34.5 G/DL (ref 31.5–35.7)
MCV RBC AUTO: 93.3 FL (ref 79–97)
MONOCYTES # BLD AUTO: 0.95 10*3/MM3 (ref 0.1–0.9)
MONOCYTES NFR BLD AUTO: 14.3 % (ref 5–12)
NEUTROPHILS NFR BLD AUTO: 3.52 10*3/MM3 (ref 1.7–7)
NEUTROPHILS NFR BLD AUTO: 52.9 % (ref 42.7–76)
NRBC BLD AUTO-RTO: 0 /100 WBC (ref 0–0.2)
PLATELET # BLD AUTO: 235 10*3/MM3 (ref 140–450)
PMV BLD AUTO: 9.1 FL (ref 6–12)
POTASSIUM SERPL-SCNC: 4.2 MMOL/L (ref 3.5–5.2)
PROT SERPL-MCNC: 6.6 G/DL (ref 6–8.5)
RBC # BLD AUTO: 5.22 10*6/MM3 (ref 4.14–5.8)
SODIUM SERPL-SCNC: 138 MMOL/L (ref 136–145)
TROPONIN T SERPL-MCNC: <0.01 NG/ML (ref 0–0.03)
TROPONIN T SERPL-MCNC: <0.01 NG/ML (ref 0–0.03)
WBC # BLD AUTO: 6.64 10*3/MM3 (ref 3.4–10.8)
WHOLE BLOOD HOLD SPECIMEN: NORMAL
WHOLE BLOOD HOLD SPECIMEN: NORMAL

## 2020-07-07 PROCEDURE — 99284 EMERGENCY DEPT VISIT MOD MDM: CPT

## 2020-07-07 PROCEDURE — 84484 ASSAY OF TROPONIN QUANT: CPT

## 2020-07-07 PROCEDURE — 80053 COMPREHEN METABOLIC PANEL: CPT

## 2020-07-07 PROCEDURE — 85379 FIBRIN DEGRADATION QUANT: CPT | Performed by: EMERGENCY MEDICINE

## 2020-07-07 PROCEDURE — 84484 ASSAY OF TROPONIN QUANT: CPT | Performed by: EMERGENCY MEDICINE

## 2020-07-07 PROCEDURE — 85025 COMPLETE CBC W/AUTO DIFF WBC: CPT

## 2020-07-07 PROCEDURE — 93010 ELECTROCARDIOGRAM REPORT: CPT | Performed by: INTERNAL MEDICINE

## 2020-07-07 PROCEDURE — 71046 X-RAY EXAM CHEST 2 VIEWS: CPT

## 2020-07-07 PROCEDURE — 93005 ELECTROCARDIOGRAM TRACING: CPT | Performed by: EMERGENCY MEDICINE

## 2020-07-07 PROCEDURE — 93005 ELECTROCARDIOGRAM TRACING: CPT

## 2020-07-07 RX ORDER — SODIUM CHLORIDE 0.9 % (FLUSH) 0.9 %
10 SYRINGE (ML) INJECTION AS NEEDED
Status: DISCONTINUED | OUTPATIENT
Start: 2020-07-07 | End: 2020-07-07 | Stop reason: HOSPADM

## 2020-07-07 NOTE — DISCHARGE INSTRUCTIONS
You have been evaluated today for chest pain.  Your evaluation was not suggestive of any emergent condition requiring medical intervention at this time.  Continue current home medications  Follow-up with your PMD or cardiologist in 3-5 days, call to get appointment scheduled  Return to the ER immediately for worsening chest pain, palpitations, shortness of breath, persistent vomiting, dizziness, fainting or any new or worsening symptoms

## 2020-07-07 NOTE — ED TRIAGE NOTES
Pt c/o left sided chest pain with deep breathing and shortness of breath for five days.      Mask placed on patient at triage. Triage staff wore appropriate PPE.

## 2020-07-07 NOTE — ED PROVIDER NOTES
EMERGENCY DEPARTMENT ENCOUNTER    Room Number:  41/41  Date of encounter:  7/7/2020  PCP: Pedro Roa MD  Historian: Patient      HPI:  Chief Complaint: Left-sided chest pain  A complete HPI/ROS/PMH/PSH/SH/FH are unobtainable due to: Nothing    Context: Jamil Kamara is a 49 y.o. male who arrives to the ED via private vehicle.  Patient presents with c/o moderate, constant, achy left-sided chest pain that worsens when he takes a deep breath for the past 5 days.   Patient also complains of shortness of breath, 1 episode of diaphoresis.  Patient denies fever, chills, cough, nausea, vomiting, radiation of the pain.  Patient states that nothing makes the symptoms better and taking a deep breath worsens symptoms.  Patient states he had similar symptoms earlier this year except for they were on the right side.  He states he had a normal stress test done at that time.  Patient has a strong family history of both mother and father having history of heart attacks.  Patient states he took Tylenol and Gas-X with no relief of his symptoms.  Patient has past medical history of colon cancer, kidney stones.  He denies being a smoker.        PAST MEDICAL HISTORY  Active Ambulatory Problems     Diagnosis Date Noted   • Arthritis    • Allergic    • Hematochezia 02/12/2019   • Early satiety 02/12/2019   • Dyspepsia and disorder of function of stomach 02/12/2019   • Rectal cancer (CMS/[facility]) 02/27/2019   • Family history of colon cancer in father 12/16/2019     Resolved Ambulatory Problems     Diagnosis Date Noted   • No Resolved Ambulatory Problems     Past Medical History:   Diagnosis Date   • Abdominal pain    • Allergic rhinitis    • Colon cancer (CMS/HCC) 02/20/2019   • Colon polyps    • Diverticulosis    • Dry mouth    • Fatty liver 02/07/2019   • Gas pain    • H/O Acute GI bleeding 2015   • Insomnia    • Kidney stones    • Migraines    • Rectal bleeding    • Renal disorder    • Stiff neck    • Syncope    • Tattoos    •  Ulcer of abdomen wall (CMS/HCC) 2013         PAST SURGICAL HISTORY  Past Surgical History:   Procedure Laterality Date   • APPENDECTOMY N/A    • COLON RESECTION N/A 3/1/2019    Procedure: COLON RESECTION LAPAROSCOPIC LOW ANTERIOR, flexible sigmoidoscopy;  Surgeon: Matthew Gardner MD;  Location: [facility] MAIN OR;  Service: General   • COLONOSCOPY N/A 2/20/2019    Procedure: COLONOSCOPY with Hot and Cold Polypectomy and Biopsies;  Surgeon: Nino Murphy MD;  Location: [facility] ENDOSCOPY;  Service: Gastroenterology   • CYSTOSCOPY W/ URETERAL STENT PLACEMENT  06/04/2016    Dr. Teddy Kaba   • ENDOSCOPY N/A 2/20/2019    Procedure: ESOPHAGOGASTRODUODENOSCOPY with Biopsies;  Surgeon: Nino Murphy MD;  Location: [facility] ENDOSCOPY;  Service: Gastroenterology   • ENDOSCOPY AND COLONOSCOPY N/A 04/23/2015    tubular adenoma rectal polyp   • KNEE ARTHROSCOPY WITH OPEN LIGAMENT REPAIR Bilateral 1980, 1994    right knee: 1980, left knee: 1994   • ORIF TIBIA/FIBULA FRACTURES Left 1980's   • SHOULDER SURGERY Bilateral     Bicep repair on left, broken shoulder on right    • WISDOM TOOTH EXTRACTION Bilateral          FAMILY HISTORY  Family History   Problem Relation Age of Onset   • Heart attack Mother    • Heart disease Mother    • Heart failure Mother    • Colon cancer Father 47   • Colon polyps Father    • Heart disease Father    • Diabetes Father    • Heart failure Father    • Malig Hyperthermia Neg Hx          SOCIAL HISTORY  Social History     Socioeconomic History   • Marital status:      Spouse name: Corrine   • Number of children: Not on file   • Years of education: High School   • Highest education level: Not on file   Occupational History   • 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        PHYSICAL EXAM    ED Triage Vitals   Temp Heart Rate Resp BP SpO2   07/07/20 1408 07/07/20 1408 07/07/20 1408 07/07/20 1419 07/07/20 1408   97.3 °F (36.3 °C) 80 15 126/87 99 %       Physical Exam  GENERAL: Well appearing, non-toxic appearing, not distressed  HENT: normocephalic, atraumatic  EYES: no scleral icterus, PERRL  CV: regular rhythm, regular rate, no murmur, reproducible pain to left chest wall  RESPIRATORY: normal effort, CTAB  ABDOMEN: soft, nontender  MUSCULOSKELETAL: no deformity  NEURO: alert, moves all extremities, follows commands, mental status normal/baseline  SKIN: warm, dry, no rash   Psych: Appropriate mood and affect  Nursing notes and vital signs reviewed      LAB RESULTS  Recent Results (from the past 24 hour(s))   Comprehensive Metabolic Panel    Collection Time: 07/07/20  2:52 PM   Result Value Ref Range    Glucose 92 65 - 99 mg/dL    BUN 13 6 - 20 mg/dL    Creatinine 1.04 0.76 - 1.27 mg/dL    Sodium 138 136 - 145 mmol/L    Potassium 4.2 3.5 - 5.2 mmol/L    Chloride 104 98 - 107 mmol/L    CO2 24.4 22.0 - 29.0 mmol/L    Calcium 9.1 8.6 - 10.5 mg/dL    Total Protein 6.6 6.0 - 8.5 g/dL    Albumin 4.20 3.50 - 5.20 g/dL    ALT (SGPT) 22 1 - 41 U/L    AST (SGOT) 23 1 - 40 U/L    Alkaline Phosphatase 74 39 - 117 U/L    Total Bilirubin 0.7 0.0 - 1.2 mg/dL    eGFR Non African Amer 76 >60 mL/min/1.73    Globulin 2.4 gm/dL    A/G Ratio 1.8 g/dL    BUN/Creatinine Ratio 12.5 7.0 - 25.0    Anion Gap 9.6 5.0 - 15.0 mmol/L   Troponin    Collection Time: 07/07/20  2:52 PM   Result Value Ref Range    Troponin T <0.010 0.000 - 0.030 ng/mL   Light Blue Top    Collection Time: 07/07/20  2:52 PM   Result Value Ref Range    Extra Tube hold for add-on    Green Top (Gel)     Collection Time: 07/07/20  2:52 PM   Result Value Ref Range    Extra Tube Hold for add-ons.    Lavender Top    Collection Time: 07/07/20  2:52 PM   Result Value Ref Range    Extra Tube hold for add-on    Gold Top - SST    Collection Time: 07/07/20  2:52 PM   Result Value Ref Range    Extra Tube Hold for add-ons.    CBC Auto Differential    Collection Time: 07/07/20  2:52 PM   Result Value Ref Range    WBC 6.64 3.40 - 10.80 10*3/mm3    RBC 5.22 4.14 - 5.80 10*6/mm3    Hemoglobin 16.8 13.0 - 17.7 g/dL    Hematocrit 48.7 37.5 - 51.0 %    MCV 93.3 79.0 - 97.0 fL    MCH 32.2 26.6 - 33.0 pg    MCHC 34.5 31.5 - 35.7 g/dL    RDW 12.8 12.3 - 15.4 %    RDW-SD 44.1 37.0 - 54.0 fl    MPV 9.1 6.0 - 12.0 fL    Platelets 235 140 - 450 10*3/mm3    Neutrophil % 52.9 42.7 - 76.0 %    Lymphocyte % 27.0 19.6 - 45.3 %    Monocyte % 14.3 (H) 5.0 - 12.0 %    Eosinophil % 4.7 0.3 - 6.2 %    Basophil % 0.6 0.0 - 1.5 %    Immature Grans % 0.5 0.0 - 0.5 %    Neutrophils, Absolute 3.52 1.70 - 7.00 10*3/mm3    Lymphocytes, Absolute 1.79 0.70 - 3.10 10*3/mm3    Monocytes, Absolute 0.95 (H) 0.10 - 0.90 10*3/mm3    Eosinophils, Absolute 0.31 0.00 - 0.40 10*3/mm3    Basophils, Absolute 0.04 0.00 - 0.20 10*3/mm3    Immature Grans, Absolute 0.03 0.00 - 0.05 10*3/mm3    nRBC 0.0 0.0 - 0.2 /100 WBC   D-dimer, Quantitative    Collection Time: 07/07/20  2:52 PM   Result Value Ref Range    D-Dimer, Quantitative 0.28 0.00 - 0.49 MCGFEU/mL   Troponin    Collection Time: 07/07/20  4:11 PM   Result Value Ref Range    Troponin T <0.010 0.000 - 0.030 ng/mL       Ordered the above labs and independently reviewed the results.      RADIOLOGY  Xr Chest 2 View    Result Date: 7/7/2020  EMERGENCY PA AND LATERAL CHEST X-RAY 07/07/2020  HISTORY:  Chest pain in left upper side for 5 days, patient also complains of some shortness of breath.  This is correlated to a prior chest CT from [facility] on 06/17/2020 as well as a prior chest x-ray 01/07/2016.   FINDINGS: The cardiomediastinal silhouette and pulmonary vasculature are within normal limits.  There are horizontal linear areas of atelectasis or scarring at the lung bases, otherwise the lungs are clear. The costophrenic angles are sharp.      Slightly low lung volumes with horizontal linear areas of atelectasis or scarring at the lung bases, otherwise no active disease is seen in the chest.  This report was finalized on 7/7/2020 4:01 PM by Dr. Jaden Lang M.D.        I ordered the above noted radiological studies and viewed the images on the PACS system.       EKG      Independently viewed by me and interpreted by Dr Roa         MEDICAL RECORD REVIEW  Medical records reviewed in [facility], patient had a stress echo done December 10, 2019 which showed :  Interpretation Summary     · Rest EF = 62%  · Left ventricular systolic function is normal.  · Mild mitral valve regurgitation is present  · Trace aortic valve regurgitation is present.  · STRESS ECHO  · Normal stress echo with no significant echocardiographic evidence for myocardial ischemia.  · Post EF = 80%.         PROCEDURES    Procedures    HEART SCORE    History Moderately suspicious (1)  ECG Normal (0)  Age 46-65 (1)  Risk factors 1 or 2 (1)  Troponin < or = Normal limit (0)    This patient's HEART score is 2    HEART Score Key:  Scores 0-3: 0.9-1.7% risk of adverse cardiac event. In the HEART Score study, these patients were discharged (0.99% in the retrospective study, 1.7% in the prospective study)  Scores 4-6: 12-16.6% risk of adverse cardiac event. In the HEART Score study, these patients were admitted to the hospital. (11.6% retrospective, 16.6% prospective)  Scores ?7: 50-65% risk of adverse cardiac event. In the HEART Score study, these patients were candidates for early invasive measures. (65.2% retrospective, 50.1% prospective)        DIFFERENTIAL DIAGNOSIS  Differential diagnosis for chest pain include but are not limited to the  following:  Anxiety, muscle strain, costochondritis, pleurisy, herpes zoster, MI, ACS, Aortic dissection, PE, pneumonia, pneumothorax, GERD        PROGRESS, DATA ANALYSIS, CONSULTS, AND MEDICAL DECISION MAKING    PPE: Patient was placed in face mask in first look. Patient was wearing facemask when I entered the room and throughout our encounter. I wore full protective equipment throughout this patient encounter including a face mask, and gloves. Hand hygiene was performed before donning protective equipment and after removal when leaving the room.      ED Course as of Jul 07 1927   Tue Jul 07, 2020   1434 EKG independently viewed and contemporaneously interpreted by ED physician. Time: 1409.  Rate 61.  Interpretation: Normal sinus rhythm, normal axis, normal QRS, no acute ST changes.    [JG]   1441 Discussed pertinent information from history and physical exam with patient.  Discussed differential diagnosis and plan for ED evaluation/work-up and treatment including EKG, chest x ray, and labs.  All questions answered.  Patient is agreeable with this plan.        [MS]   1756 Reviewed pt's history and workup with Dr. Roa.  After a bedside evaluation, they agree with the plan of care.          [MS]      ED Course User Index  [JG] Richi De La Rosa MD  [MS] Francoise Willingham, APRN     1920:  HEART Score: 2  I have discussed at length with the patient the results of the test.  I have a low index of suspicion that anything serious is occurring.  The symptoms are atypical for coronary artery disease, pulmonary embolism, thoracic aortic dissection, or any other emergent condition.  The patient understands limitation of testing and understands if we are unable to rule out a disease process 100%.  All questions were answered.  The patient will return if symptoms worsen, develops shortness of breath, fever, weakness in extremities, or any other concerns.  They will also follow-up with PMD and or cardiology as provided  and as instructed.    Discussed plan for discharge, as there is no emergent indication for admission. Patient referred to primary care provider for BP management due to today's BP. Pt/family is agreeable and understands need for follow up and repeat testing.  Pt is aware that discharge does not mean that nothing is wrong but it indicates no emergency is present that requires admission and they must continue care with follow-up as given below or physician of their choice.   Patient/Family voiced understanding of above instructions.  Patient discharged in stable condition.    DIAGNOSIS  Final diagnoses:   Atypical chest pain       FOLLOW UP   Pedro Roa MD  [address] [facility] [address]  [phone]    Call in 1 day      [facility] CARDIOLOGY  [address]  [facility] [address] [phone]  Call in 1 day            MEDICATIONS GIVEN IN ED    Medications   sodium chloride 0.9 % flush 10 mL (has no administration in time range)           COURSE & MEDICAL DECISION MAKING  Any/All labs and Any/All Imaging studies that were ordered were reviewed and are noted above.  Results were reviewed/discussed with the patient and they were also made aware of online assess.   Pt also made aware that some labs, such as cultures, will not be resulted during ER visit and follow up with PMD is necessary.        Francoise Willingham, APRN  07/07/20 7862

## 2020-07-07 NOTE — ED PROVIDER NOTES
MD ATTESTATION NOTE    The CHALO and I have discussed this patient's history, physical exam, and treatment plan.  I have reviewed the documentation and personally had a face to face interaction with the patient. I affirm the documentation and agree with the treatment and plan.  The attached note describes my personal findings.      Jamil Kamara is a 49 y.o. male who presents to the ED c/o chest pain.  Onset Friday.  Symptoms are constant but worsened takes a big breath.  Pain is currently mild.  It feels like pressure and is located primary in the left side of his chest wall.      On exam:  Regular rate and rhythm, no murmur  Clear to auscultation bilaterally  Reproducible chest wall tenderness in the left side  2+ radial pulses bilaterally  No lower extremity edema or tenderness    Labs  Recent Results (from the past 24 hour(s))   Comprehensive Metabolic Panel    Collection Time: 07/07/20  2:52 PM   Result Value Ref Range    Glucose 92 65 - 99 mg/dL    BUN 13 6 - 20 mg/dL    Creatinine 1.04 0.76 - 1.27 mg/dL    Sodium 138 136 - 145 mmol/L    Potassium 4.2 3.5 - 5.2 mmol/L    Chloride 104 98 - 107 mmol/L    CO2 24.4 22.0 - 29.0 mmol/L    Calcium 9.1 8.6 - 10.5 mg/dL    Total Protein 6.6 6.0 - 8.5 g/dL    Albumin 4.20 3.50 - 5.20 g/dL    ALT (SGPT) 22 1 - 41 U/L    AST (SGOT) 23 1 - 40 U/L    Alkaline Phosphatase 74 39 - 117 U/L    Total Bilirubin 0.7 0.0 - 1.2 mg/dL    eGFR Non African Amer 76 >60 mL/min/1.73    Globulin 2.4 gm/dL    A/G Ratio 1.8 g/dL    BUN/Creatinine Ratio 12.5 7.0 - 25.0    Anion Gap 9.6 5.0 - 15.0 mmol/L   Troponin    Collection Time: 07/07/20  2:52 PM   Result Value Ref Range    Troponin T <0.010 0.000 - 0.030 ng/mL   Light Blue Top    Collection Time: 07/07/20  2:52 PM   Result Value Ref Range    Extra Tube hold for add-on    Green Top (Gel)    Collection Time: 07/07/20  2:52 PM   Result Value Ref Range    Extra Tube Hold for add-ons.    Lavender Top    Collection Time: 07/07/20  2:52 PM    Result Value Ref Range    Extra Tube hold for add-on    Gold Top - SST    Collection Time: 07/07/20  2:52 PM   Result Value Ref Range    Extra Tube Hold for add-ons.    CBC Auto Differential    Collection Time: 07/07/20  2:52 PM   Result Value Ref Range    WBC 6.64 3.40 - 10.80 10*3/mm3    RBC 5.22 4.14 - 5.80 10*6/mm3    Hemoglobin 16.8 13.0 - 17.7 g/dL    Hematocrit 48.7 37.5 - 51.0 %    MCV 93.3 79.0 - 97.0 fL    MCH 32.2 26.6 - 33.0 pg    MCHC 34.5 31.5 - 35.7 g/dL    RDW 12.8 12.3 - 15.4 %    RDW-SD 44.1 37.0 - 54.0 fl    MPV 9.1 6.0 - 12.0 fL    Platelets 235 140 - 450 10*3/mm3    Neutrophil % 52.9 42.7 - 76.0 %    Lymphocyte % 27.0 19.6 - 45.3 %    Monocyte % 14.3 (H) 5.0 - 12.0 %    Eosinophil % 4.7 0.3 - 6.2 %    Basophil % 0.6 0.0 - 1.5 %    Immature Grans % 0.5 0.0 - 0.5 %    Neutrophils, Absolute 3.52 1.70 - 7.00 10*3/mm3    Lymphocytes, Absolute 1.79 0.70 - 3.10 10*3/mm3    Monocytes, Absolute 0.95 (H) 0.10 - 0.90 10*3/mm3    Eosinophils, Absolute 0.31 0.00 - 0.40 10*3/mm3    Basophils, Absolute 0.04 0.00 - 0.20 10*3/mm3    Immature Grans, Absolute 0.03 0.00 - 0.05 10*3/mm3    nRBC 0.0 0.0 - 0.2 /100 WBC   D-dimer, Quantitative    Collection Time: 07/07/20  2:52 PM   Result Value Ref Range    D-Dimer, Quantitative 0.28 0.00 - 0.49 MCGFEU/mL   Troponin    Collection Time: 07/07/20  4:11 PM   Result Value Ref Range    Troponin T <0.010 0.000 - 0.030 ng/mL       Radiology  Xr Chest 2 View    Result Date: 7/7/2020  EMERGENCY PA AND LATERAL CHEST X-RAY 07/07/2020  HISTORY:  Chest pain in left upper side for 5 days, patient also complains of some shortness of breath.  This is correlated to a prior chest CT from [facility] on 06/17/2020 as well as a prior chest x-ray 01/07/2016.  FINDINGS: The cardiomediastinal silhouette and pulmonary vasculature are within normal limits.  There are horizontal linear areas of atelectasis or scarring at the lung bases, otherwise the lungs are clear. The  costophrenic angles are sharp.      Slightly low lung volumes with horizontal linear areas of atelectasis or scarring at the lung bases, otherwise no active disease is seen in the chest.  This report was finalized on 7/7/2020 4:01 PM by Dr. Jaden Lang M.D.        Medical Decision Making:  ED Course as of Jul 07 2343   Tue Jul 07, 2020   1434 EKG independently viewed and contemporaneously interpreted by ED physician. Time: 1409.  Rate 61.  Interpretation: Normal sinus rhythm, normal axis, normal QRS, no acute ST changes.    [JG]   1441 Discussed pertinent information from history and physical exam with patient.  Discussed differential diagnosis and plan for ED evaluation/work-up and treatment including EKG, chest x ray, and labs.  All questions answered.  Patient is agreeable with this plan.        [MS]   0986 Reviewed pt's history and workup with Dr. Roa.  After a bedside evaluation, they agree with the plan of care.          [MS]      ED Course User Index  [JG] Richi De La Rosa MD  [MS] Francoise Willingham, APRN           PPE: Both the patient and I wore a surgical mask throughout the entire patient encounter.     Diagnosis  Final diagnoses:   Atypical chest pain        Burak Roa II, MD  07/07/20 1455

## 2020-07-09 ENCOUNTER — OFFICE VISIT (OUTPATIENT)
Dept: FAMILY MEDICINE CLINIC | Facility: CLINIC | Age: 50
End: 2020-07-09

## 2020-07-09 VITALS
TEMPERATURE: 98.4 F | OXYGEN SATURATION: 98 % | HEIGHT: 69 IN | HEART RATE: 66 BPM | RESPIRATION RATE: 20 BRPM | BODY MASS INDEX: 30.36 KG/M2 | DIASTOLIC BLOOD PRESSURE: 80 MMHG | SYSTOLIC BLOOD PRESSURE: 126 MMHG | WEIGHT: 205 LBS

## 2020-07-09 DIAGNOSIS — R07.89 CHEST WALL PAIN: Primary | ICD-10-CM

## 2020-07-09 PROCEDURE — 99213 OFFICE O/P EST LOW 20 MIN: CPT | Performed by: FAMILY MEDICINE

## 2020-07-09 RX ORDER — CYCLOBENZAPRINE HCL 5 MG
5 TABLET ORAL EVERY 8 HOURS PRN
Qty: 30 TABLET | Refills: 1 | Status: SHIPPED | OUTPATIENT
Start: 2020-07-09 | End: 2021-10-13 | Stop reason: SDUPTHER

## 2020-07-09 RX ORDER — CYCLOBENZAPRINE HCL 5 MG
5 TABLET ORAL EVERY 8 HOURS PRN
Qty: 30 TABLET | Refills: 1 | Status: CANCELLED | OUTPATIENT
Start: 2020-07-09

## 2020-07-09 NOTE — PROGRESS NOTES
SUBJECTIVE:  The patient is a 49-year-old white male.  He is here for follow-up.  He was seen in the emergency room on July 7 for chest pain.  He was having fatigue and shortness of breath.  Refer to ER note for specific details.  He ruled out acute coronary syndrome.  He has a strong family history of heart disease.  He has rectal cancer.  He feels better but is still having some discomfort with movement and a deep breath.    PAST MEDICAL HISTORY:  Reviewed.    REVIEW OF SYSTEMS:  Please see above; all others reviewed and are negative.      OBJECTIVE:   Vitals signs are reviewed and are stable.    General:  Well-nourished.  Alert and oriented x3 in no acute distress.  HEENT: PERRLA.   Neck:  Supple.   Lungs:  Clear.    Heart:  Regular rate and rhythm.   Chest: No rash noted.  Tenderness is present across the left anterior/lateral mid rib cage  Abdomen:   Soft, nontender.   Extremities:  No cyanosis, clubbing or edema.   Neurological:  Grossly intact without motor or sensory deficits.     ASSESSMENT:    Chest wall pain  PLAN: He cannot take NSAIDs.  He will take Tylenol and he has some muscle relaxers at home he will try.  Advised to apply heat.  Advised if he should break out in a rash to call me because this could have been a shingles prodrome.    Dictated utilizing Dragon dictation.

## 2020-07-28 ENCOUNTER — PREP FOR SURGERY (OUTPATIENT)
Dept: OTHER | Facility: HOSPITAL | Age: 50
End: 2020-07-28

## 2020-07-28 ENCOUNTER — OFFICE VISIT (OUTPATIENT)
Dept: GASTROENTEROLOGY | Facility: CLINIC | Age: 50
End: 2020-07-28

## 2020-07-28 ENCOUNTER — TELEPHONE (OUTPATIENT)
Dept: SURGERY | Facility: CLINIC | Age: 50
End: 2020-07-28

## 2020-07-28 VITALS — HEIGHT: 69 IN | TEMPERATURE: 97.7 F | WEIGHT: 207.2 LBS | BODY MASS INDEX: 30.69 KG/M2

## 2020-07-28 DIAGNOSIS — C20 RECTAL CARCINOMA (HCC): Primary | ICD-10-CM

## 2020-07-28 PROCEDURE — 99212 OFFICE O/P EST SF 10 MIN: CPT | Performed by: INTERNAL MEDICINE

## 2020-07-28 NOTE — PROGRESS NOTES
"Chief Complaint   Patient presents with   • Follow-up   • Colonoscopy       Jamil Kamara is a  49 y.o. male here for a follow up visit for history of rectal carcinoma.    HPI     Patient 49-year-old male with history of rectal carcinoma status post resection here for follow-up.  Patient reports doing better no active bleeding noted bowels are regular.  Patient has been complaining of atypical chest pain mostly pleuritic but found COVID negative.  Patient referred now for follow-up for further recommendations.    Past Medical History:   Diagnosis Date   • Abdominal pain    • Allergic    • Allergic rhinitis    • Arthritis    • Colon cancer (CMS/HCC) 02/20/2019    Invasive moderately differentiated adenocarcinoma   • Colon polyps    • Diverticulosis    • Dry mouth    • Fatty liver 02/07/2019   • Gas pain    • H/O Acute GI bleeding 2015   • Insomnia     \"nerves\"   • Kidney stones     in past   • Migraines    • Rectal bleeding    • Renal disorder    • Stiff neck     holding neck still to avoid dizziness   • Syncope    • Tattoos    • Ulcer of abdomen wall (CMS/HCC) 2013         Current Outpatient Medications:   •  acetaminophen (TYLENOL) 500 MG tablet, Take 500 mg by mouth Every 6 (Six) Hours As Needed for Mild Pain ., Disp: , Rfl:   •  cyclobenzaprine (FLEXERIL) 5 MG tablet, Take 1 tablet by mouth Every 8 (Eight) Hours As Needed for Muscle Spasms., Disp: 30 tablet, Rfl: 1  •  diphenhydrAMINE (BENADRYL) 50 MG capsule, Take 50 mg by mouth Every 6 (Six) Hours As Needed for Itching., Disp: , Rfl:   •  Multiple Vitamins-Minerals (MULTIVITAMIN ADULT PO), Take 1 tablet by mouth Daily., Disp: , Rfl:   •  Unable to find, 1 each 1 (One) Time. 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     Employer: [employer]   Tobacco Use   • Smoking status: Former Smoker     Types: Cigars   • Smokeless tobacco: Never Used   • Tobacco comment: a few cigars a year    Substance and Sexual Activity   • Alcohol use: Yes     Comment: social, not daily   • Drug use: No   • Sexual activity: Yes       Family History   Problem Relation Age of Onset   • Heart attack Mother    • Heart disease Mother    • Heart failure Mother    • Colon cancer Father 47   • Colon polyps Father    • Heart disease Father    • Diabetes Father    • Heart failure Father    • Malig Hyperthermia Neg Hx        Review of Systems   Constitutional: Negative.    Respiratory: Negative.    Cardiovascular: Positive for chest pain. Negative for palpitations and leg swelling.   Gastrointestinal: Negative.    Musculoskeletal: Negative.    Skin: Negative.    Hematological: Negative.        Vitals:    07/28/20 0930   Temp: 97.7 °F (36.5 °C)       Physical Exam   Constitutional: He is oriented to person, place, and time. He appears well-developed and well-nourished.   HENT:   Head: Normocephalic and atraumatic.   Eyes: Pupils are equal, round, and reactive to light. No scleral icterus.   Cardiovascular: Normal rate, regular rhythm and normal heart sounds.   Pulmonary/Chest: Effort normal and breath sounds normal. No respiratory distress.   Abdominal: Soft. Bowel sounds are normal. He exhibits no distension and no mass. There is no tenderness. No hernia.   Neurological: He is alert and oriented to person, place, and time.   Skin: Skin is warm and dry. No rash noted.   Psychiatric: He has a normal mood and affect. His behavior is normal.   Vitals reviewed.      Admission on 07/07/2020,  Discharged on 07/07/2020   Component Date Value Ref Range Status   • Glucose 07/07/2020 92  65 - 99 mg/dL Final   • BUN 07/07/2020 13  6 - 20 mg/dL Final   • Creatinine 07/07/2020 1.04  0.76 - 1.27 mg/dL Final   • Sodium 07/07/2020 138  136 - 145 mmol/L Final   • Potassium 07/07/2020 4.2  3.5 - 5.2 mmol/L Final   • Chloride 07/07/2020 104  98 - 107 mmol/L Final   • CO2 07/07/2020 24.4  22.0 - 29.0 mmol/L Final   • Calcium 07/07/2020 9.1  8.6 - 10.5 mg/dL Final   • Total Protein 07/07/2020 6.6  6.0 - 8.5 g/dL Final   • Albumin 07/07/2020 4.20  3.50 - 5.20 g/dL Final   • ALT (SGPT) 07/07/2020 22  1 - 41 U/L Final   • AST (SGOT) 07/07/2020 23  1 - 40 U/L Final   • Alkaline Phosphatase 07/07/2020 74  39 - 117 U/L Final   • Total Bilirubin 07/07/2020 0.7  0.0 - 1.2 mg/dL Final   • eGFR Non African Amer 07/07/2020 76  >60 mL/min/1.73 Final   • Globulin 07/07/2020 2.4  gm/dL Final   • A/G Ratio 07/07/2020 1.8  g/dL Final   • BUN/Creatinine Ratio 07/07/2020 12.5  7.0 - 25.0 Final   • Anion Gap 07/07/2020 9.6  5.0 - 15.0 mmol/L Final   • Troponin T 07/07/2020 <0.010  0.000 - 0.030 ng/mL Final   • Troponin T 07/07/2020 <0.010  0.000 - 0.030 ng/mL Final   • Extra Tube 07/07/2020 hold for add-on   Final   • Extra Tube 07/07/2020 Hold for add-ons.   Final   • Extra Tube 07/07/2020 hold for add-on   Final   • Extra Tube 07/07/2020 Hold for add-ons.   Final   • WBC 07/07/2020 6.64  3.40 - 10.80 10*3/mm3 Final   • RBC 07/07/2020 5.22  4.14 - 5.80 10*6/mm3 Final   • Hemoglobin 07/07/2020 16.8  13.0 - 17.7 g/dL Final   • Hematocrit 07/07/2020 48.7  37.5 - 51.0 % Final   • MCV 07/07/2020 93.3  79.0 - 97.0 fL Final   • MCH 07/07/2020 32.2  26.6 - 33.0 pg Final   • MCHC 07/07/2020 34.5  31.5 - 35.7 g/dL Final   • RDW 07/07/2020 12.8  12.3 - 15.4 % Final   • RDW-SD 07/07/2020 44.1  37.0 - 54.0 fl Final   • MPV 07/07/2020 9.1  6.0 - 12.0 fL Final   • Platelets 07/07/2020 235  140 - 450 10*3/mm3 Final   • Neutrophil % 07/07/2020 52.9  42.7  - 76.0 % Final   • Lymphocyte % 07/07/2020 27.0  19.6 - 45.3 % Final   • Monocyte % 07/07/2020 14.3* 5.0 - 12.0 % Final   • Eosinophil % 07/07/2020 4.7  0.3 - 6.2 % Final   • Basophil % 07/07/2020 0.6  0.0 - 1.5 % Final   • Immature Grans % 07/07/2020 0.5  0.0 - 0.5 % Final   • Neutrophils, Absolute 07/07/2020 3.52  1.70 - 7.00 10*3/mm3 Final   • Lymphocytes, Absolute 07/07/2020 1.79  0.70 - 3.10 10*3/mm3 Final   • Monocytes, Absolute 07/07/2020 0.95* 0.10 - 0.90 10*3/mm3 Final   • Eosinophils, Absolute 07/07/2020 0.31  0.00 - 0.40 10*3/mm3 Final   • Basophils, Absolute 07/07/2020 0.04  0.00 - 0.20 10*3/mm3 Final   • Immature Grans, Absolute 07/07/2020 0.03  0.00 - 0.05 10*3/mm3 Final   • nRBC 07/07/2020 0.0  0.0 - 0.2 /100 WBC Final   • D-Dimer, Quantitative 07/07/2020 0.28  0.00 - 0.49 MCGFEU/mL Final   Lab on 06/17/2020   Component Date Value Ref Range Status   • Glucose 06/17/2020 73* 74 - 124 mg/dL Final   • BUN 06/17/2020 14  6 - 20 mg/dL Final   • Creatinine 06/17/2020 0.99  0.70 - 1.30 mg/dL Final   • Sodium 06/17/2020 139  134 - 145 mmol/L Final   • Potassium 06/17/2020 4.1  3.5 - 4.7 mmol/L Final   • Chloride 06/17/2020 101  98 - 107 mmol/L Final   • CO2 06/17/2020 25.8  22.0 - 29.0 mmol/L Final   • Calcium 06/17/2020 9.1  8.5 - 10.2 mg/dL Final   • Total Protein 06/17/2020 7.1  6.3 - 8.0 g/dL Final   • Albumin 06/17/2020 4.30  3.50 - 5.20 g/dL Final   • ALT (SGPT) 06/17/2020 18  0 - 41 U/L Final   • AST (SGOT) 06/17/2020 23  0 - 40 U/L Final   • Alkaline Phosphatase 06/17/2020 78  38 - 116 U/L Final   • Total Bilirubin 06/17/2020 0.6  0.2 - 1.2 mg/dL Final   • eGFR Non African Amer 06/17/2020 80  >60 mL/min/1.73 Final   • Globulin 06/17/2020 2.8  1.8 - 3.5 gm/dL Final   • A/G Ratio 06/17/2020 1.5  1.1 - 2.4 g/dL Final   • BUN/Creatinine Ratio 06/17/2020 14.1  7.3 - 30.0 Final   • Anion Gap 06/17/2020 12.2  5.0 - 15.0 mmol/L Final   • CEA 06/17/2020 5.35  ng/mL Final   • WBC 06/17/2020 5.61  3.40 - 10.80  10*3/mm3 Final   • RBC 06/17/2020 5.12  4.14 - 5.80 10*6/mm3 Final   • Hemoglobin 06/17/2020 16.5  13.0 - 17.7 g/dL Final   • Hematocrit 06/17/2020 48.7  37.5 - 51.0 % Final   • MCV 06/17/2020 95.1  79.0 - 97.0 fL Final   • MCH 06/17/2020 32.2  26.6 - 33.0 pg Final   • MCHC 06/17/2020 33.9  31.5 - 35.7 g/dL Final   • RDW 06/17/2020 11.7* 12.3 - 15.4 % Final   • RDW-SD 06/17/2020 40.5  37.0 - 54.0 fl Final   • MPV 06/17/2020 9.9  6.0 - 12.0 fL Final   • Platelets 06/17/2020 257  140 - 450 10*3/mm3 Final   • Neutrophil % 06/17/2020 56.4  42.7 - 76.0 % Final   • Lymphocyte % 06/17/2020 23.9  19.6 - 45.3 % Final   • Monocyte % 06/17/2020 12.5* 5.0 - 12.0 % Final   • Eosinophil % 06/17/2020 5.5  0.3 - 6.2 % Final   • Basophil % 06/17/2020 1.2  0.0 - 1.5 % Final   • Immature Grans % 06/17/2020 0.5  0.0 - 0.5 % Final   • Neutrophils, Absolute 06/17/2020 3.16  1.70 - 7.00 10*3/mm3 Final   • Lymphocytes, Absolute 06/17/2020 1.34  0.70 - 3.10 10*3/mm3 Final   • Monocytes, Absolute 06/17/2020 0.70  0.10 - 0.90 10*3/mm3 Final   • Eosinophils, Absolute 06/17/2020 0.31  0.00 - 0.40 10*3/mm3 Final   • Basophils, Absolute 06/17/2020 0.07  0.00 - 0.20 10*3/mm3 Final   • Immature Grans, Absolute 06/17/2020 0.03  0.00 - 0.05 10*3/mm3 Final   • nRBC 06/17/2020 0.0  0.0 - 0.2 /100 WBC Final       Jamil was seen today for follow-up and colonoscopy.    Diagnoses and all orders for this visit:    Rectal carcinoma (CMS/HCC)  -     Case Request; Standing  -     Case Request      Patient 49-year-old male with history of rectal carcinoma last year status post low anterior resection here for follow-up.  Patient currently reports pleuritic chest pain that is resolving seen by ER COVID negative.  Patient otherwise doing well.  Patient is due for follow-up colonoscopy postop will arrange endoscopic evaluation and make further recommendations pending results.

## 2020-08-10 ENCOUNTER — TELEPHONE (OUTPATIENT)
Dept: GASTROENTEROLOGY | Facility: CLINIC | Age: 50
End: 2020-08-10

## 2020-08-10 ENCOUNTER — TRANSCRIBE ORDERS (OUTPATIENT)
Dept: SLEEP MEDICINE | Facility: HOSPITAL | Age: 50
End: 2020-08-10

## 2020-08-10 DIAGNOSIS — Z01.818 OTHER SPECIFIED PRE-OPERATIVE EXAMINATION: Primary | ICD-10-CM

## 2020-08-10 NOTE — TELEPHONE ENCOUNTER
----- Message from Jamil Kamara sent at 8/8/2020  7:52 AM EDT -----  Regarding: Visit Follow-Up Question  Contact: [phone]  I haven't been contacted yet about my [place] test before my procedure on Friday. Just checking to make sure everything go smoothly with no delays. Thank you sir have a great day

## 2020-08-12 ENCOUNTER — LAB (OUTPATIENT)
Dept: LAB | Facility: HOSPITAL | Age: 50
End: 2020-08-12

## 2020-08-12 DIAGNOSIS — Z01.818 OTHER SPECIFIED PRE-OPERATIVE EXAMINATION: ICD-10-CM

## 2020-08-12 PROCEDURE — U0004 COV-19 TEST NON-CDC HGH THRU: HCPCS

## 2020-08-12 PROCEDURE — C9803 HOPD COVID-19 SPEC COLLECT: HCPCS

## 2020-08-13 LAB
REF LAB TEST METHOD: NORMAL
SARS-COV-2 RNA RESP QL NAA+PROBE: NOT DETECTED

## 2020-08-14 ENCOUNTER — ANESTHESIA (OUTPATIENT)
Dept: GASTROENTEROLOGY | Facility: HOSPITAL | Age: 50
End: 2020-08-14

## 2020-08-14 ENCOUNTER — HOSPITAL ENCOUNTER (OUTPATIENT)
Facility: HOSPITAL | Age: 50
Setting detail: HOSPITAL OUTPATIENT SURGERY
Discharge: HOME | End: 2020-08-14
Attending: INTERNAL MEDICINE | Admitting: INTERNAL MEDICINE

## 2020-08-14 ENCOUNTER — ANESTHESIA EVENT (OUTPATIENT)
Dept: GASTROENTEROLOGY | Facility: HOSPITAL | Age: 50
End: 2020-08-14

## 2020-08-14 VITALS
BODY MASS INDEX: 28.92 KG/M2 | RESPIRATION RATE: 16 BRPM | OXYGEN SATURATION: 98 % | SYSTOLIC BLOOD PRESSURE: 113 MMHG | HEIGHT: 70 IN | DIASTOLIC BLOOD PRESSURE: 92 MMHG | HEART RATE: 60 BPM | WEIGHT: 202 LBS

## 2020-08-14 DIAGNOSIS — C20 RECTAL CARCINOMA (HCC): ICD-10-CM

## 2020-08-14 PROCEDURE — 25010000002 PROPOFOL 10 MG/ML EMULSION: Performed by: NURSE ANESTHETIST, CERTIFIED REGISTERED

## 2020-08-14 PROCEDURE — 88305 TISSUE EXAM BY PATHOLOGIST: CPT | Performed by: INTERNAL MEDICINE

## 2020-08-14 PROCEDURE — 45385 COLONOSCOPY W/LESION REMOVAL: CPT | Performed by: INTERNAL MEDICINE

## 2020-08-14 RX ORDER — SODIUM CHLORIDE 0.9 % (FLUSH) 0.9 %
10 SYRINGE (ML) INJECTION AS NEEDED
Status: DISCONTINUED | OUTPATIENT
Start: 2020-08-14 | End: 2020-08-14 | Stop reason: HOSPADM

## 2020-08-14 RX ORDER — PROPOFOL 10 MG/ML
VIAL (ML) INTRAVENOUS CONTINUOUS PRN
Status: DISCONTINUED | OUTPATIENT
Start: 2020-08-14 | End: 2020-08-14 | Stop reason: SURG

## 2020-08-14 RX ORDER — LIDOCAINE HYDROCHLORIDE 20 MG/ML
INJECTION, SOLUTION INFILTRATION; PERINEURAL AS NEEDED
Status: DISCONTINUED | OUTPATIENT
Start: 2020-08-14 | End: 2020-08-14 | Stop reason: SURG

## 2020-08-14 RX ORDER — SODIUM CHLORIDE, SODIUM LACTATE, POTASSIUM CHLORIDE, CALCIUM CHLORIDE 600; 310; 30; 20 MG/100ML; MG/100ML; MG/100ML; MG/100ML
INJECTION, SOLUTION INTRAVENOUS CONTINUOUS PRN
Status: DISCONTINUED | OUTPATIENT
Start: 2020-08-14 | End: 2020-08-14 | Stop reason: SURG

## 2020-08-14 RX ORDER — LIDOCAINE HYDROCHLORIDE 10 MG/ML
0.5 INJECTION, SOLUTION INFILTRATION; PERINEURAL ONCE AS NEEDED
Status: DISCONTINUED | OUTPATIENT
Start: 2020-08-14 | End: 2020-08-14 | Stop reason: HOSPADM

## 2020-08-14 RX ORDER — SODIUM CHLORIDE, SODIUM LACTATE, POTASSIUM CHLORIDE, CALCIUM CHLORIDE 600; 310; 30; 20 MG/100ML; MG/100ML; MG/100ML; MG/100ML
1000 INJECTION, SOLUTION INTRAVENOUS CONTINUOUS
Status: DISCONTINUED | OUTPATIENT
Start: 2020-08-14 | End: 2020-08-14 | Stop reason: HOSPADM

## 2020-08-14 RX ORDER — PROPOFOL 10 MG/ML
VIAL (ML) INTRAVENOUS AS NEEDED
Status: DISCONTINUED | OUTPATIENT
Start: 2020-08-14 | End: 2020-08-14 | Stop reason: SURG

## 2020-08-14 RX ADMIN — SODIUM CHLORIDE, POTASSIUM CHLORIDE, SODIUM LACTATE AND CALCIUM CHLORIDE: 600; 310; 30; 20 INJECTION, SOLUTION INTRAVENOUS at 13:55

## 2020-08-14 RX ADMIN — PROPOFOL 300 MCG/KG/MIN: 10 INJECTION, EMULSION INTRAVENOUS at 14:03

## 2020-08-14 RX ADMIN — LIDOCAINE HYDROCHLORIDE 60 MG: 20 INJECTION, SOLUTION INFILTRATION; PERINEURAL at 14:03

## 2020-08-14 RX ADMIN — PROPOFOL 70 MG: 10 INJECTION, EMULSION INTRAVENOUS at 14:03

## 2020-08-14 RX ADMIN — SODIUM CHLORIDE, POTASSIUM CHLORIDE, SODIUM LACTATE AND CALCIUM CHLORIDE 1000 ML: 600; 310; 30; 20 INJECTION, SOLUTION INTRAVENOUS at 13:05

## 2020-08-14 NOTE — ANESTHESIA PREPROCEDURE EVALUATION
Anesthesia Evaluation     Patient summary reviewed and Nursing notes reviewed   NPO Solid Status: > 8 hours  NPO Liquid Status: > 4 hours           Airway   Mallampati: II  TM distance: >3 FB  Neck ROM: full  no difficulty expected  Dental - normal exam     Pulmonary - normal exam   Cardiovascular - normal exam        Neuro/Psych  (+) headaches, syncope,     GI/Hepatic/Renal/Endo    (+)  GI bleeding , liver disease fatty liver disease, renal disease stones,     Musculoskeletal     (+) neck stiffness,   Abdominal  - normal exam   Substance History      OB/GYN          Other   arthritis,    history of cancer remission                    Anesthesia Plan    ASA 2     MAC       Anesthetic plan, all risks, benefits, and alternatives have been provided, discussed and informed consent has been obtained with: patient.

## 2020-08-14 NOTE — ANESTHESIA POSTPROCEDURE EVALUATION
"Patient: Jamil Kamara    Procedure Summary     Date:  08/14/20 Room / Location:   [facility]    Anesthesia Start:  1355 Anesthesia Stop:  1421    Procedure:  COLONOSCOPY to cecum and TI with hot polypectomy (N/A ) Diagnosis:       Rectal carcinoma (CMS/HCC)      Colon polyps      Internal hemorrhoids      (Rectal carcinoma (CMS/HCC) [C20])    Surgeon:  Nino Murphy MD Provider:  Cody Carmichael MD    Anesthesia Type:  MAC ASA Status:  2          Anesthesia Type: MAC    Vitals  Vitals Value Taken Time   /92 8/14/2020  2:41 PM   Temp     Pulse 60 8/14/2020  2:41 PM   Resp 16 8/14/2020  2:41 PM   SpO2 98 % 8/14/2020  2:41 PM           Post Anesthesia Care and Evaluation    Patient location during evaluation: bedside  Patient participation: complete - patient participated  Level of consciousness: awake and alert  Pain management: adequate  Airway patency: patent  Anesthetic complications: No anesthetic complications    Cardiovascular status: acceptable  Respiratory status: acceptable  Hydration status: acceptable    Comments: /92 (BP Location: Left arm, Patient Position: Lying)   Pulse 60   Resp 16   Ht 177.8 cm (70\")   Wt 91.6 kg (202 lb)   SpO2 98%   BMI 28.98 kg/m²       "

## 2020-08-14 NOTE — BRIEF OP NOTE
COLONOSCOPY  Progress Note    Jamil Kamara  8/14/2020    Pre-op Diagnosis:   Rectal carcinoma (CMS/HCC) [C20]       Post-Op Diagnosis Codes:     * Rectal carcinoma (CMS/HCC) [C20]     * Colon polyps [K63.5]     * Internal hemorrhoids [K64.8]    Procedure/CPT® Codes:        Procedure(s):  COLONOSCOPY to cecum and TI with hot polypectomy    Surgeon(s):  Nino Murphy MD    Anesthesia: Monitored Anesthesia Care    Staff:   Endo Technician: Bibiana Hawkins  Endo Nurse: Antoinette Carrillo RN         Estimated Blood Loss: minimal    Urine Voided: * No values recorded between 8/14/2020  1:51 PM and 8/14/2020  2:18 PM *    Specimens:                Specimens     ID Source Type Tests Collected By Collected At Frozen?      A Large Intestine, Rectum Polyp · TISSUE PATHOLOGY EXAM   Nino Murphy MD 8/14/20 5536      Description: rectal polyp                Drains: * No LDAs found *    Findings: Colonoscopy to terminal ileum with normal mucosa.  Moderate amount of retained stool.  Rectosigmoid anastomosis with rectal polyp present removed hot snare.  Remainder the colon mucosa was unremarkable 1+ internal hemorrhoids were identified.    Complications: None          Nino Murphy MD     Date: 8/14/2020  Time: 14:21

## 2020-08-14 NOTE — DISCHARGE INSTRUCTIONS
For the next 24 hours patient needs to be with a responsible adult.    For 24 hours DO NOT drive, operate machinery, appliances, drink alcohol, make important decisions or sign legal documents.    Start with a light or bland diet if you are feeling sick to your stomach otherwise advance to regular diet as tolerated.    Follow recommendations on procedure report if provided by your doctor.    Call Dr lemus for problems [phone]    Problems may include but not limited to: large amounts of bleeding, trouble breathing, repeated vomiting, severe unrelieved pain, fever or chills.

## 2020-08-18 ENCOUNTER — TELEPHONE (OUTPATIENT)
Dept: GASTROENTEROLOGY | Facility: CLINIC | Age: 50
End: 2020-08-18

## 2020-08-18 NOTE — TELEPHONE ENCOUNTER
Called pt back. Pt states he spoke with Dr Murphy after the procedure and Dr Murphy said it would be to OK to be off work until Wednesday 8/19. He states it has been a good thing because he has been dealing with diarrhea ever since he colonoscopy. Advised will make sure OK with MD to write the note and will also look for the paperwork from [facility] for him. Pt verb understanding.

## 2020-08-18 NOTE — TELEPHONE ENCOUNTER
"----- Message from Radames Hoyt Rep sent at 8/17/2020  2:19 PM EDT -----  Regarding: Work Note/[employer]  Contact: [phone]  Patient states he was a little \"hazy\" when he came out of his procedure on Friday. States he thinks he's suppose to be off till Wednesday but doesn't have a work not and would like to know if he can get one? And when he can return to work?    Also wanted to check to see if we received any paperwork from [employer], states they faxed it over to be filled out.   "

## 2020-08-19 NOTE — TELEPHONE ENCOUNTER
Letter completed and printed.     Called pt and advised that his letter to return to work is completed and I can leave that at the  of our office. Pt verb understanding.

## 2020-08-19 NOTE — TELEPHONE ENCOUNTER
From: Fely Jackson [employer] Rep   Sent: 8/19/2020  11:01 AM EDT   To: [facility] Clinical 2 Pool     Patient needs Return to work note. Needs to state diagnoses and no restrictions.     Patient will  when it is ready.

## 2020-09-28 ENCOUNTER — TELEPHONE (OUTPATIENT)
Dept: GASTROENTEROLOGY | Facility: CLINIC | Age: 50
End: 2020-09-28

## 2020-09-28 NOTE — TELEPHONE ENCOUNTER
Results sent to pt via a message on [employer].      Health Maintenance updated to reflect C/S due 8/2025.

## 2020-09-28 NOTE — TELEPHONE ENCOUNTER
----- Message from Nino Murphy MD sent at 9/25/2020  2:30 PM EDT -----  Benign polyp removed, repeat colonoscopy 5 years as discussed

## 2020-10-14 ENCOUNTER — OFFICE VISIT (OUTPATIENT)
Dept: FAMILY MEDICINE CLINIC | Facility: CLINIC | Age: 50
End: 2020-10-14

## 2020-10-14 VITALS
RESPIRATION RATE: 20 BRPM | SYSTOLIC BLOOD PRESSURE: 120 MMHG | BODY MASS INDEX: 29.06 KG/M2 | WEIGHT: 203 LBS | DIASTOLIC BLOOD PRESSURE: 70 MMHG | OXYGEN SATURATION: 97 % | HEIGHT: 70 IN | HEART RATE: 69 BPM | TEMPERATURE: 98 F

## 2020-10-14 DIAGNOSIS — M54.50 LUMBAR PAIN: Primary | ICD-10-CM

## 2020-10-14 DIAGNOSIS — R25.2 LEG CRAMPS: ICD-10-CM

## 2020-10-14 LAB
ANION GAP SERPL CALCULATED.3IONS-SCNC: 8.7 MMOL/L (ref 5–15)
BUN SERPL-MCNC: 17 MG/DL (ref 6–20)
BUN/CREAT SERPL: 17.2 (ref 7–25)
CALCIUM SPEC-SCNC: 9.1 MG/DL (ref 8.6–10.5)
CHLORIDE SERPL-SCNC: 105 MMOL/L (ref 98–107)
CO2 SERPL-SCNC: 26.3 MMOL/L (ref 22–29)
CREAT SERPL-MCNC: 0.99 MG/DL (ref 0.76–1.27)
GFR SERPL CREATININE-BSD FRML MDRD: 80 ML/MIN/1.73
GLUCOSE SERPL-MCNC: 85 MG/DL (ref 65–99)
POTASSIUM SERPL-SCNC: 3.6 MMOL/L (ref 3.5–5.2)
SODIUM SERPL-SCNC: 140 MMOL/L (ref 136–145)

## 2020-10-14 PROCEDURE — 99213 OFFICE O/P EST LOW 20 MIN: CPT | Performed by: FAMILY MEDICINE

## 2020-10-14 PROCEDURE — 80048 BASIC METABOLIC PNL TOTAL CA: CPT | Performed by: FAMILY MEDICINE

## 2020-10-14 RX ORDER — TRAMADOL HYDROCHLORIDE 50 MG/1
50 TABLET ORAL EVERY 8 HOURS PRN
Qty: 30 TABLET | Refills: 0 | Status: SHIPPED | OUTPATIENT
Start: 2020-10-14 | End: 2021-03-23

## 2020-10-14 NOTE — PROGRESS NOTES
"SUBJECTIVE:  The patient is a 50-year-old white male.  He is here today because of leg cramps and lumbar pain.  He has a history of lumbar pain.  PAST MEDICAL HISTORY:  Reviewed.    REVIEW OF SYSTEMS:  Please see above.  All others reviewed and are negative.      OBJECTIVE:   /70 (BP Location: Left arm, Patient Position: Sitting)   Pulse 69   Temp 98 °F (36.7 °C) (Infrared)   Resp 20   Ht 177.8 cm (70\")   Wt 92.1 kg (203 lb)   SpO2 97%   BMI 29.13 kg/m²    Vitals signs are reviewed and are stable.    General:  Well-nourished.  Alert and oriented x3 in no acute distress.  HEENT: PERRLA.   Neck:  Supple.   Lungs:  Clear.    Heart:  Regular rate and rhythm.   Abdomen:   Soft, nontender.   Back: The patient displays with flexion and extension.  Paravertebral lumbar spasm is noted.  Straight leg raise is negative.  Extremities:  No cyanosis, clubbing or edema.   Neurological:  Grossly intact without motor or sensory deficits.     ASSESSMENT:      Diagnoses and all orders for this visit:    1. Lumbar pain (Primary)  -     Basic Metabolic Panel    2. Leg cramps  -     Basic Metabolic Panel         PLAN: See above labs.  FMLA forms provided.  Follow-up on labs.  He he will call if any problems.      Will continue his muscle relaxer.  He is given tramadol 1-2 every 6 hours as needed pain.  He knows this is habituating.  He has had it before and does not use it often.    Dictated utilizing Dragon dictation.    "

## 2020-11-23 ENCOUNTER — HOSPITAL ENCOUNTER (OUTPATIENT)
Dept: PET IMAGING | Facility: HOSPITAL | Age: 50
Discharge: HOME | End: 2020-11-23
Admitting: INTERNAL MEDICINE

## 2020-11-23 DIAGNOSIS — C20 RECTAL CANCER (HCC): ICD-10-CM

## 2020-11-23 PROCEDURE — 74176 CT ABD & PELVIS W/O CONTRAST: CPT

## 2020-11-23 PROCEDURE — 0 DIATRIZOATE MEGLUMINE & SODIUM PER 1 ML: Performed by: INTERNAL MEDICINE

## 2020-11-23 PROCEDURE — 71250 CT THORAX DX C-: CPT

## 2020-11-23 PROCEDURE — 70490 CT SOFT TISSUE NECK W/O DYE: CPT

## 2020-11-23 RX ADMIN — DIATRIZOATE MEGLUMINE AND DIATRIZOATE SODIUM 30 ML: 660; 100 LIQUID ORAL; RECTAL at 10:51

## 2020-12-03 ENCOUNTER — OFFICE VISIT (OUTPATIENT)
Dept: ONCOLOGY | Facility: CLINIC | Age: 50
End: 2020-12-03

## 2020-12-03 ENCOUNTER — LAB (OUTPATIENT)
Dept: LAB | Facility: HOSPITAL | Age: 50
End: 2020-12-03

## 2020-12-03 VITALS
HEART RATE: 65 BPM | SYSTOLIC BLOOD PRESSURE: 125 MMHG | RESPIRATION RATE: 18 BRPM | BODY MASS INDEX: 29.31 KG/M2 | HEIGHT: 70 IN | OXYGEN SATURATION: 98 % | TEMPERATURE: 98.1 F | WEIGHT: 204.7 LBS | DIASTOLIC BLOOD PRESSURE: 83 MMHG

## 2020-12-03 DIAGNOSIS — C18.9 MALIGNANT NEOPLASM OF COLON, UNSPECIFIED PART OF COLON (HCC): Primary | ICD-10-CM

## 2020-12-03 DIAGNOSIS — C20 RECTAL CANCER (HCC): ICD-10-CM

## 2020-12-03 LAB
ALBUMIN SERPL-MCNC: 4.3 G/DL (ref 3.5–5.2)
ALBUMIN/GLOB SERPL: 1.7 G/DL (ref 1.1–2.4)
ALP SERPL-CCNC: 77 U/L (ref 38–116)
ALT SERPL W P-5'-P-CCNC: 19 U/L (ref 0–41)
ANION GAP SERPL CALCULATED.3IONS-SCNC: 8 MMOL/L (ref 5–15)
AST SERPL-CCNC: 26 U/L (ref 0–40)
BASOPHILS # BLD AUTO: 0.06 10*3/MM3 (ref 0–0.2)
BASOPHILS NFR BLD AUTO: 1 % (ref 0–1.5)
BILIRUB SERPL-MCNC: 0.7 MG/DL (ref 0.2–1.2)
BUN SERPL-MCNC: 15 MG/DL (ref 6–20)
BUN/CREAT SERPL: 16.1 (ref 7.3–30)
CALCIUM SPEC-SCNC: 8.9 MG/DL (ref 8.5–10.2)
CHLORIDE SERPL-SCNC: 102 MMOL/L (ref 98–107)
CO2 SERPL-SCNC: 28 MMOL/L (ref 22–29)
CREAT SERPL-MCNC: 0.93 MG/DL (ref 0.7–1.3)
DEPRECATED RDW RBC AUTO: 39 FL (ref 37–54)
EOSINOPHIL # BLD AUTO: 0.34 10*3/MM3 (ref 0–0.4)
EOSINOPHIL NFR BLD AUTO: 5.8 % (ref 0.3–6.2)
ERYTHROCYTE [DISTWIDTH] IN BLOOD BY AUTOMATED COUNT: 11.5 % (ref 12.3–15.4)
GFR SERPL CREATININE-BSD FRML MDRD: 86 ML/MIN/1.73
GLOBULIN UR ELPH-MCNC: 2.5 GM/DL (ref 1.8–3.5)
GLUCOSE SERPL-MCNC: 122 MG/DL (ref 74–124)
HCT VFR BLD AUTO: 46.6 % (ref 37.5–51)
HGB BLD-MCNC: 15.9 G/DL (ref 13–17.7)
IMM GRANULOCYTES # BLD AUTO: 0.03 10*3/MM3 (ref 0–0.05)
IMM GRANULOCYTES NFR BLD AUTO: 0.5 % (ref 0–0.5)
LYMPHOCYTES # BLD AUTO: 1.48 10*3/MM3 (ref 0.7–3.1)
LYMPHOCYTES NFR BLD AUTO: 25.3 % (ref 19.6–45.3)
MCH RBC QN AUTO: 31.7 PG (ref 26.6–33)
MCHC RBC AUTO-ENTMCNC: 34.1 G/DL (ref 31.5–35.7)
MCV RBC AUTO: 93 FL (ref 79–97)
MONOCYTES # BLD AUTO: 0.73 10*3/MM3 (ref 0.1–0.9)
MONOCYTES NFR BLD AUTO: 12.5 % (ref 5–12)
NEUTROPHILS NFR BLD AUTO: 3.22 10*3/MM3 (ref 1.7–7)
NEUTROPHILS NFR BLD AUTO: 54.9 % (ref 42.7–76)
NRBC BLD AUTO-RTO: 0 /100 WBC (ref 0–0.2)
PLATELET # BLD AUTO: 234 10*3/MM3 (ref 140–450)
PMV BLD AUTO: 9 FL (ref 6–12)
POTASSIUM SERPL-SCNC: 4.4 MMOL/L (ref 3.5–4.7)
PROT SERPL-MCNC: 6.8 G/DL (ref 6.3–8)
RBC # BLD AUTO: 5.01 10*6/MM3 (ref 4.14–5.8)
SODIUM SERPL-SCNC: 138 MMOL/L (ref 134–145)
WBC # BLD AUTO: 5.86 10*3/MM3 (ref 3.4–10.8)

## 2020-12-03 PROCEDURE — 80053 COMPREHEN METABOLIC PANEL: CPT

## 2020-12-03 PROCEDURE — 36415 COLL VENOUS BLD VENIPUNCTURE: CPT

## 2020-12-03 PROCEDURE — 99214 OFFICE O/P EST MOD 30 MIN: CPT | Performed by: INTERNAL MEDICINE

## 2020-12-03 PROCEDURE — 85025 COMPLETE CBC W/AUTO DIFF WBC: CPT

## 2020-12-03 NOTE — PROGRESS NOTES
Subjective     REASON FOR FOLLOW-UP:   1. 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 Fatty liver    3. 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 Genetics testing negative.    History of Present Illness   Patient with T2N0 adenocarcinoma of the upper rectum status post low anterior resection here for follow-up.  Given T2 N0 tumor, no role for chemoradiation. Followed by observation with imaging and labs.     He reports feeling well overall.  He met with Dr. Roa who had ordered a test to check for a weak heart.  He will follow up with him soon.  His surgery was on 03/01/19 with Dr. Gardner.  He has been eating well, but his diet consists of mostly fried foods.  I have advised him to eat a better diet.    We reviewed with patient the CT scans from 10/30/19 which was stable and negative for metastasis.    Genetics test was negative.    Interval history: Patient underwent CT scan and I reviewed it with the patient is negative for malignancy.  Reviewed the results of his colonoscopy from August 14, 2020 which shows that there is moderate amount of retained stool.  Rectosigmoid anastomosis with the rectal polyp present which has been removed.  Pathology is benign tubular adenoma        Oncology history:  Patient is a 48-year-old gentleman who has had history of bright red blood per rectum since last several months.  It was going on for 4 months.  In the past he had similar symptoms and had found a stomach ulcer and was placed on Protonix at that time.  But this time he noted and went to work.  HEENT at work he started getting dizzy and then was taken to the emergency room by his wife.  He had lost 10-15 pounds in the past 4 months but he had a tendency to lose and gain.  He also felt fatigued.    In the emergency room he had a CT abdomen pelvis and that showed extensive fatty liver all of the other  organs were unremarkable.  There was no evidence of any acute inflammation or abnormal enhancement.  He had history of having had a commode full of blood at work prior to his ER visit.  The emergency room discharged him with a follow-up and he was seen by Dr. Murphy.    Patient was found to have a 7 mm polyp in the descending colon and also a 10 mm polyp in the sigmoid colon which were resected.  He had a fungating non-obstructing medium-sized mass in the proximal rectum.  The mass was circumferential partially involving one half of the lumen circumference.  It was 3 cm in length and in diameter it was 21 mm.  A biopsy was done and it was consistent with moderately differentiated adenocarcinoma of the rectum.  It was thought to be in the upper rectum or proximal rectum.  He also had normal EGD which showed normal esophagus his gastric antrum was mildly erythematous which is biopsied and he had a normal examined duodenum.    On the pathology the colon polyp in the descending colon and the sigmoid colon were all tubular adenomas but the biopsy of the rectum showed moderately differentiated adenocarcinoma.  The antral biopsy was negative and there was no Helicobacter pylori.  Patient underwent MRI which also showed the mass to be in the high rectum and patient was seen by Dr. Gardner and a low anterior resection was done.    Pathology showed invasive moderately differentiated adenocarcinoma 2.5 x 2.3 x 0.5 cm and tumor invades the muscularis propria.  It is grade 2.  There is no lymphovascular space invasion or perineural invasion identified.  Margins were all clear.  0 of 12 lymph nodes were involved.  He has a T2 N0 moderately differentiated adenocarcinoma of the upper rectum.    A portion of the tumor with adjacent normal tissue was sent for MSI and BRAF testing and it is pending.  Multiple sections of the tumor show invasive moderately differentiated adenocarcinoma invading the submucosa and into the muscularis  "propria.  It does not invade through the muscularis propria in the reviewed sections.  No definite lymphovascular space or perineural invasion seen.    CEA was 4.97 as of February 20, 2019 and this was prior to surgery.    He has been referred here to see further options of treatment.    His father has had a history of colon cancer at age 47.    MRI spine done on 04/18/2019  IMPRESSION:   No evidence for metastatic disease to the thoracic spine. The previously  noted lucent lesions within the T2 and T4 vertebrae are compatible with  Hemangiomas.    NM bone scan done on 04/01/2019:  IMPRESSION:  Scattered foci of radiotracer activity in the spine is  consistent with degenerative change. There is no scintigraphic evidence  of osseous metastasis.    CT neck, chest and abdomen pelvis done on 04/03/2019:  IMPRESSION:  1. Status post rectal resection.  2. Punctate nonobstructing stone in each kidney.  3. Fatty infiltration of the liver.  4. Two tiny smoothly marginated lucencies in the approximate T3 and T5  vertebrae. These are nonspecific and could represent small benign  lesions such as small hemangiomas. The possibility of 1 or more tiny  metastatic lesions is not excluded, but thought to be less likely. If  further evaluation is clinically indicated, MRI of the thoracic spine or  correlation with a short-term follow-up CT chest may be helpful.  5. No other definite evidence of metastatic disease.    06/12/19: Genetics testing negative.    Past Medical History:   Diagnosis Date   • Abdominal pain    • Allergic    • Allergic rhinitis    • Arthritis    • Colon cancer (CMS/HCC) 02/20/2019    Invasive moderately differentiated adenocarcinoma   • Colon polyps    • Diverticulosis    • Dry mouth    • Fatty liver 02/07/2019   • Gas pain    • H/O Acute GI bleeding 2015   • Insomnia     \"nerves\"   • Kidney stones     in past   • Migraines    • Rectal bleeding    • Renal disorder    • Stiff neck     holding neck still to avoid " dizziness   • Syncope    • Tattoos    • Ulcer of abdomen wall (CMS/HCC) 2013        Past Surgical History:   Procedure Laterality Date   • APPENDECTOMY N/A    • COLON RESECTION N/A 3/1/2019    Procedure: COLON RESECTION LAPAROSCOPIC LOW ANTERIOR, flexible sigmoidoscopy;  Surgeon: Matthew Gardner MD;  Location: [facility] MAIN OR;  Service: General   • COLONOSCOPY N/A 2/20/2019    Procedure: COLONOSCOPY with Hot and Cold Polypectomy and Biopsies;  Surgeon: Nino Murphy MD;  Location: [facility] ENDOSCOPY;  Service: Gastroenterology   • COLONOSCOPY N/A 8/14/2020    Procedure: COLONOSCOPY to cecum and TI with hot polypectomy;  Surgeon: Nino Murphy MD;  Location: [facility] ENDOSCOPY;  Service: Gastroenterology;  Laterality: N/A;  pre - hx colon ca  post - polyp, hx resection   • CYSTOSCOPY W/ URETERAL STENT PLACEMENT  06/04/2016    Dr. Teddy Kaba   • ENDOSCOPY N/A 2/20/2019    Procedure: ESOPHAGOGASTRODUODENOSCOPY with Biopsies;  Surgeon: Nino Murphy MD;  Location: [facility] ENDOSCOPY;  Service: Gastroenterology   • ENDOSCOPY AND COLONOSCOPY N/A 04/23/2015    tubular adenoma rectal polyp   • KNEE ARTHROSCOPY WITH OPEN LIGAMENT REPAIR Bilateral 1980, 1994    right knee: 1980, left knee: 1994   • ORIF TIBIA/FIBULA FRACTURES Left 1980's   • SHOULDER SURGERY Bilateral     Bicep repair on left, broken shoulder on right    • WISDOM TOOTH EXTRACTION Bilateral         Current Outpatient Medications on File Prior to Visit   Medication Sig Dispense Refill   • acetaminophen (TYLENOL) 500 MG tablet Take 500 mg by mouth Every 6 (Six) Hours As Needed for Mild Pain .     • cyclobenzaprine (FLEXERIL) 5 MG tablet Take 1 tablet by mouth Every 8 (Eight) Hours As Needed for Muscle Spasms. 30 tablet 1   • diphenhydrAMINE (BENADRYL) 50 MG capsule Take 50 mg by mouth Every 6 (Six) Hours As Needed for Itching.     • POTASSIUM PO Take 100 mg by mouth Every Other Day.     • traMADol (ULTRAM) 50 MG tablet Take 1 tablet by mouth  Every 8 (Eight) Hours As Needed for Severe Pain . 30 tablet 0   • Unable to find 1 each 1 (One) Time. Med Name: CBD gummies     • potassium bicarbonate (K-LYTE) 25 MEQ disintegrating tablet Take  by mouth. Taking otc potassium qod       No current facility-administered medications on file prior to visit.         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     Employer: [employer]   Tobacco Use   • Smoking status: Former Smoker     Types: Cigars   • Smokeless tobacco: Never Used   • Tobacco comment: a few cigars a year    Substance and Sexual Activity   • Alcohol use: Yes     Comment: social, not daily   • Drug use: No   • Sexual activity: Yes        Family History   Problem Relation Age of Onset   • Heart attack Mother    • Heart disease Mother    • Heart failure Mother    • Colon cancer Father 47   • Colon polyps Father    • Heart disease Father    • Diabetes Father    • Heart failure Father    • Malig Hyperthermia Neg Hx         Review of Systems   Constitutional: Positive for fatigue (12/03/20-Unchanged). Negative for appetite change, chills, diaphoresis, fever and unexpected weight change.   HENT: Negative for hearing loss, sore throat and trouble swallowing.    Respiratory: Positive for shortness of breath (12/03/20-Improved). Negative for cough, chest tightness and wheezing.    Cardiovascular: Negative for chest pain, palpitations and leg swelling.   Gastrointestinal: Positive for abdominal pain (Lactose  "intolerant-12/03/20-Unchanged) and constipation (12/03/20-Unchanged). Negative for abdominal distention, diarrhea, nausea and vomiting.   Genitourinary: Positive for frequency (Urine dark and strong smell.  12/03/20-Unchanged). Negative for dysuria, hematuria and urgency.        Burning with urination   Musculoskeletal: Positive for arthralgias (Muscle spasms all over body -12/03/20) and back pain (12/03/20-Unchanged). Negative for joint swelling.        No muscle weakness.   Skin: Negative for rash and wound.   Neurological: Positive for weakness (12/03/20-Unchanged) and headaches (12/03/20-Improved). Negative for seizures, syncope, speech difficulty and numbness.   Hematological: Negative for adenopathy. Does not bruise/bleed easily.   Psychiatric/Behavioral: Negative for behavioral problems, confusion and suicidal ideas.   All other systems reviewed and are negative.   I have reviewed and confirmed the accuracy of the ROS as documented by the MA/LPN/RN Magalis Chamorro MD        Objective      Vitals:    12/03/20 0918   BP: 125/83   Pulse: 65   Resp: 18   Temp: 98.1 °F (36.7 °C)   TempSrc: Skin   SpO2: 98%   Weight: 92.9 kg (204 lb 11.2 oz)   Height: 177.8 cm (70\")   PainSc: 0-No pain     Current Status 12/3/2020   ECOG score 0       Physical Exam    GENERAL:  Well-developed, well-nourished in no acute distress.   NECK:  Supple with good range of motion; no thyromegaly or masses, no JVD.  LYMPHATICS:  No cervical, supraclavicular, axillary or inguinal adenopathy.  CHEST:  Lungs clear to auscultation. Good airflow.  CARDIAC:  Regular rate and rhythm without murmurs, rubs or gallops. Normal S1,S2.  ABDOMEN:  Soft, nontender with no hepatosplenomegaly or masses.  EXTREMITIES:  No clubbing, cyanosis or edema.  NEUROLOGICAL:  Cranial Nerves II-XII grossly intact.  No focal neurological deficits.  PSYCHIATRIC:  Normal affect and mood.      I have reexamined the patient and the results are consistent with the previously " documented exam. Magalis Chamorro MD       RECENT LABS:  Hematology WBC   Date Value Ref Range Status   12/03/2020 5.86 3.40 - 10.80 10*3/mm3 Final     RBC   Date Value Ref Range Status   12/03/2020 5.01 4.14 - 5.80 10*6/mm3 Final     Hemoglobin   Date Value Ref Range Status   12/03/2020 15.9 13.0 - 17.7 g/dL Final     Hematocrit   Date Value Ref Range Status   12/03/2020 46.6 37.5 - 51.0 % Final     Platelets   Date Value Ref Range Status   12/03/2020 234 140 - 450 10*3/mm3 Final        CT NECK, CHEST, ABDOMEN, AND PELVIS WITHOUT IV CONTRAST. 11/23/20    IMPRESSION:  Stable examination. There is no evidence for metastatic  disease and there is no new abnormality.       EMERGENCY PA AND LATERAL CHEST X-RAY 07/07/2020     IMPRESSION:  Slightly low lung volumes with horizontal linear areas of  atelectasis or scarring at the lung bases, otherwise no active disease  is seen in the chest.       CT NECK, CHEST, ABDOMEN, AND PELVIS WITH IV CONTRAST 06/17/20  IMPRESSION:  There is no evidence for metastatic disease and there is no  new abnormality.    RECENT IMAGING:   10/30/19 - CT Chest Abdomen Pelvis  IMPRESSION:  Stable examination. There is no evidence for metastatic  disease and there is no new abnormality.    06/25/19 - Genetics Scan  Negative    Assessment/Plan    1.  T2 N0 moderately differentiated adenocarcinoma of the upper rectum, newly diagnosed status post low anterior resection by Dr. Gardner.  Patient initially presented with bright red blood per rectum.  This was lasting 4-5 months.  He then underwent EGD colonoscopy by Dr. Murphy.  EGD was negative.  But the colonoscopy showed evidence of an lesion in the upper rectum which was 3 x 2.1 cm and it was  biopsied and consistent with moderately differentiated adenocarcinoma.  Patient was referred to Dr. Gardner and his CEA was 4.97 prior to surgery.  Patient then underwent EGD as well which was negative.  The 2 polyps in the descending colon were  negative.    Patient underwent low anterior resection which on pathology shows a 2.5 cm mass extending on into the submucosa with margins clear and not involving the lymphovascular or perineural invasion.  There is 0 out of 12 lymph nodes positive.  And the tumor was situated in the upper rectum.  · Reviewed CT scan which is negative except 2 lucent lesions on T3 and T5, suggested MRI of the thoracic spine  · MRI done on 04/18/19 is normal without evidence of malignancy and lesion on T3 and T5 are hemangioma's.   · Bone scan is negative  · Followed up with observation with CT scans in 6 months.   · CT scans from November 23, 2020 are negative.    2.  Family history of colon cancer with father having colon cancer.  Patient is to follow up with  on June 6th, 2019    Plan   1.   2. Genetics test was negative.  3. Encouraged patient on good diet and regular exercise.   4. Reviewed recent CT scan which is negative  5. Reviewed colonoscopy results which showed polyp which is benign  6. Follow-up 6 months with labs and CT scan      Magalis Chamorro MD      CC: Dr. Matthew Roa MD

## 2021-03-09 ENCOUNTER — OFFICE VISIT (OUTPATIENT)
Dept: FAMILY MEDICINE CLINIC | Facility: CLINIC | Age: 51
End: 2021-03-09

## 2021-03-09 VITALS
HEIGHT: 70 IN | SYSTOLIC BLOOD PRESSURE: 122 MMHG | OXYGEN SATURATION: 97 % | HEART RATE: 55 BPM | BODY MASS INDEX: 30.64 KG/M2 | WEIGHT: 214 LBS | TEMPERATURE: 98 F | DIASTOLIC BLOOD PRESSURE: 78 MMHG | RESPIRATION RATE: 18 BRPM

## 2021-03-09 DIAGNOSIS — R20.0 NUMBNESS: ICD-10-CM

## 2021-03-09 DIAGNOSIS — M54.50 LUMBAR PAIN: Primary | ICD-10-CM

## 2021-03-09 PROCEDURE — 99213 OFFICE O/P EST LOW 20 MIN: CPT | Performed by: FAMILY MEDICINE

## 2021-03-09 RX ORDER — MULTIPLE VITAMINS W/ MINERALS TAB 9MG-400MCG
1 TAB ORAL DAILY
COMMUNITY

## 2021-03-09 RX ORDER — CYCLOBENZAPRINE HCL 5 MG
5 TABLET ORAL 3 TIMES DAILY PRN
Qty: 30 TABLET | Refills: 1 | Status: SHIPPED | OUTPATIENT
Start: 2021-03-09 | End: 2021-07-25

## 2021-03-09 RX ORDER — METHYLPREDNISOLONE 4 MG/1
TABLET ORAL
Qty: 21 TABLET | Refills: 0 | Status: SHIPPED | OUTPATIENT
Start: 2021-03-09 | End: 2021-09-24

## 2021-03-09 NOTE — PROGRESS NOTES
"SUBJECTIVE:  The patient is a 50-year-old male.  He presents today with lumbar pain that radiates down his right leg with a numbness sensation.  He has a history of intermittent flareups of his back.  He does not want to take off work due to financial reasons.  He gets this from time to time.  He works very hard to get in and out of the truck all day.    PAST MEDICAL HISTORY:  Reviewed.    REVIEW OF SYSTEMS:  Please see above.  All others reviewed and are negative.      OBJECTIVE:   /78 (BP Location: Left arm, Patient Position: Sitting)   Pulse 55   Temp 98 °F (36.7 °C) (Infrared)   Resp 18   Ht 177.8 cm (70\")   Wt 97.1 kg (214 lb)   SpO2 97%   BMI 30.71 kg/m²    Vitals signs are reviewed and are stable.    General:  Well-nourished.  Alert and oriented x3 in no acute distress.  HEENT: PERRLA.   Neck:  Supple.   Lungs:  Clear.    Heart:  Regular rate and rhythm.   Abdomen:   Soft, nontender.   Back: Mild tenderness in the lumbar region.  Patient experiences discomfort with flexion  Extremities:  No cyanosis, clubbing or edema.   Neurological:  Grossly intact without motor or sensory deficits.     ASSESSMENT:      Diagnoses and all orders for this visit:    1. Lumbar pain (Primary)    2. Numbness    Other orders  -     methylPREDNISolone (MEDROL) 4 MG dose pack; follow package directions  Dispense: 21 tablet; Refill: 0  -     cyclobenzaprine (FLEXERIL) 5 MG tablet; Take 1 tablet by mouth 3 (Three) Times a Day As Needed for Muscle Spasms.  Dispense: 30 tablet; Refill: 1         PLAN: See above.  Patient let me know if not improved significantly.  He will call if problems.    Dictated utilizing Dragon dictation.    "

## 2021-03-23 DIAGNOSIS — M54.50 LUMBAR PAIN: ICD-10-CM

## 2021-03-23 DIAGNOSIS — Z51.81 MEDICATION MONITORING ENCOUNTER: Primary | ICD-10-CM

## 2021-03-23 DIAGNOSIS — R25.2 LEG CRAMPS: ICD-10-CM

## 2021-03-23 RX ORDER — TRAMADOL HYDROCHLORIDE 50 MG/1
TABLET ORAL
Qty: 30 TABLET | Refills: 0 | Status: SHIPPED | OUTPATIENT
Start: 2021-03-23 | End: 2021-10-13 | Stop reason: SDUPTHER

## 2021-03-24 ENCOUNTER — BULK ORDERING (OUTPATIENT)
Dept: CASE MANAGEMENT | Facility: OTHER | Age: 51
End: 2021-03-24

## 2021-03-24 DIAGNOSIS — Z23 IMMUNIZATION DUE: ICD-10-CM

## 2021-06-01 ENCOUNTER — LAB (OUTPATIENT)
Dept: LAB | Facility: HOSPITAL | Age: 51
End: 2021-06-01

## 2021-06-01 ENCOUNTER — HOSPITAL ENCOUNTER (OUTPATIENT)
Dept: CT IMAGING | Facility: HOSPITAL | Age: 51
Discharge: HOME | End: 2021-06-01
Admitting: INTERNAL MEDICINE

## 2021-06-01 DIAGNOSIS — C18.9 MALIGNANT NEOPLASM OF COLON, UNSPECIFIED PART OF COLON (HCC): ICD-10-CM

## 2021-06-01 LAB
ALBUMIN SERPL-MCNC: 4.4 G/DL (ref 3.5–5.2)
ALBUMIN/GLOB SERPL: 1.4 G/DL
ALP SERPL-CCNC: 84 U/L (ref 39–117)
ALT SERPL W P-5'-P-CCNC: 19 U/L (ref 1–41)
ANION GAP SERPL CALCULATED.3IONS-SCNC: 12.7 MMOL/L (ref 5–15)
AST SERPL-CCNC: 25 U/L (ref 1–40)
BASOPHILS # BLD AUTO: 0.08 10*3/MM3 (ref 0–0.2)
BASOPHILS NFR BLD AUTO: 1.2 % (ref 0–1.5)
BILIRUB SERPL-MCNC: 0.3 MG/DL (ref 0–1.2)
BUN SERPL-MCNC: 15 MG/DL (ref 6–20)
BUN/CREAT SERPL: 12.7 (ref 7–25)
CALCIUM SPEC-SCNC: 9.6 MG/DL (ref 8.6–10.5)
CHLORIDE SERPL-SCNC: 102 MMOL/L (ref 98–107)
CO2 SERPL-SCNC: 27.3 MMOL/L (ref 22–29)
CREAT SERPL-MCNC: 1.18 MG/DL (ref 0.76–1.27)
DEPRECATED RDW RBC AUTO: 39.6 FL (ref 37–54)
EOSINOPHIL # BLD AUTO: 0.45 10*3/MM3 (ref 0–0.4)
EOSINOPHIL NFR BLD AUTO: 6.5 % (ref 0.3–6.2)
ERYTHROCYTE [DISTWIDTH] IN BLOOD BY AUTOMATED COUNT: 11.6 % (ref 12.3–15.4)
GFR SERPL CREATININE-BSD FRML MDRD: 65 ML/MIN/1.73
GLOBULIN UR ELPH-MCNC: 3.1 GM/DL
GLUCOSE SERPL-MCNC: 88 MG/DL (ref 65–99)
HCT VFR BLD AUTO: 50.1 % (ref 37.5–51)
HGB BLD-MCNC: 17.4 G/DL (ref 13–17.7)
IMM GRANULOCYTES # BLD AUTO: 0.06 10*3/MM3 (ref 0–0.05)
IMM GRANULOCYTES NFR BLD AUTO: 0.9 % (ref 0–0.5)
LYMPHOCYTES # BLD AUTO: 1.69 10*3/MM3 (ref 0.7–3.1)
LYMPHOCYTES NFR BLD AUTO: 24.6 % (ref 19.6–45.3)
MCH RBC QN AUTO: 32.5 PG (ref 26.6–33)
MCHC RBC AUTO-ENTMCNC: 34.7 G/DL (ref 31.5–35.7)
MCV RBC AUTO: 93.5 FL (ref 79–97)
MONOCYTES # BLD AUTO: 0.98 10*3/MM3 (ref 0.1–0.9)
MONOCYTES NFR BLD AUTO: 14.2 % (ref 5–12)
NEUTROPHILS NFR BLD AUTO: 3.62 10*3/MM3 (ref 1.7–7)
NEUTROPHILS NFR BLD AUTO: 52.6 % (ref 42.7–76)
NRBC BLD AUTO-RTO: 0 /100 WBC (ref 0–0.2)
PLATELET # BLD AUTO: 276 10*3/MM3 (ref 140–450)
PMV BLD AUTO: 9.6 FL (ref 6–12)
POTASSIUM SERPL-SCNC: 5 MMOL/L (ref 3.5–5.2)
PROT SERPL-MCNC: 7.5 G/DL (ref 6–8.5)
RBC # BLD AUTO: 5.36 10*6/MM3 (ref 4.14–5.8)
SODIUM SERPL-SCNC: 142 MMOL/L (ref 136–145)
WBC # BLD AUTO: 6.88 10*3/MM3 (ref 3.4–10.8)

## 2021-06-01 PROCEDURE — 71250 CT THORAX DX C-: CPT

## 2021-06-01 PROCEDURE — 0 DIATRIZOATE MEGLUMINE & SODIUM PER 1 ML: Performed by: INTERNAL MEDICINE

## 2021-06-01 PROCEDURE — 85025 COMPLETE CBC W/AUTO DIFF WBC: CPT

## 2021-06-01 PROCEDURE — 74176 CT ABD & PELVIS W/O CONTRAST: CPT

## 2021-06-01 PROCEDURE — 80053 COMPREHEN METABOLIC PANEL: CPT | Performed by: INTERNAL MEDICINE

## 2021-06-01 PROCEDURE — 36415 COLL VENOUS BLD VENIPUNCTURE: CPT

## 2021-06-01 RX ADMIN — DIATRIZOATE MEGLUMINE AND DIATRIZOATE SODIUM 30 ML: 660; 100 LIQUID ORAL; RECTAL at 07:30

## 2021-06-11 ENCOUNTER — TELEPHONE (OUTPATIENT)
Dept: ONCOLOGY | Facility: CLINIC | Age: 51
End: 2021-06-11

## 2021-06-11 NOTE — TELEPHONE ENCOUNTER
Caller: Jamil Kamara    Relationship to patient: Self    Best call back number: [phone]    Chief complaint: PT MISSED MYCHART VISIT    Type of visit: VITALS AND MYCHART F/U    Requested date: AFTER NOON(12) ANY DAY    If rescheduling, when is the original appointment: 6/3    Additional notes:    HUB UNABLE TO SCHEDULE MYCHART APPTS

## 2021-06-15 ENCOUNTER — TELEMEDICINE (OUTPATIENT)
Dept: ONCOLOGY | Facility: CLINIC | Age: 51
End: 2021-06-15

## 2021-06-15 DIAGNOSIS — C20 RECTAL CANCER (HCC): Primary | ICD-10-CM

## 2021-06-15 PROCEDURE — 99213 OFFICE O/P EST LOW 20 MIN: CPT | Performed by: INTERNAL MEDICINE

## 2021-06-18 RX ORDER — DOXYCYCLINE HYCLATE 100 MG
100 TABLET ORAL 2 TIMES DAILY
Qty: 20 TABLET | Refills: 0 | Status: SHIPPED | OUTPATIENT
Start: 2021-06-18 | End: 2021-09-24

## 2021-06-22 ENCOUNTER — TELEPHONE (OUTPATIENT)
Dept: ONCOLOGY | Facility: CLINIC | Age: 51
End: 2021-06-22

## 2021-06-22 NOTE — TELEPHONE ENCOUNTER
Have attempted to call Mr. Kamara several times over the past 2 days with no answer or returned call.  Have left message on home telephone that CT scans were negative and that Dr. Chamorro wants to see him in 3-4 months.  Let him know the  will be calling to set up appointment or will send notice of appointment by mail, if unable to reach by phone.

## 2021-06-23 ENCOUNTER — TELEPHONE (OUTPATIENT)
Dept: ONCOLOGY | Facility: CLINIC | Age: 51
End: 2021-06-23

## 2021-06-23 NOTE — TELEPHONE ENCOUNTER
----- Message from Latosha Louie RN sent at 6/22/2021  4:29 PM EDT -----  Please make follow up appointment with Dr. Chamorro with labs in 3-4 months per Dr. Chamorro.    Thank you,  Latosha  ----- Message -----  From: Magalis Chamorro MD  Sent: 6/19/2021  10:36 AM EDT  To: Marie Brooks, EBONI Batista  Please notify the patient that his CT scan was negative.  And his neck showed he makes an appointment with us in 3 to 4 months with labs.  We could not connect on the video visit the other day on Angy 15.  Thanks  Magalis Chamorro MD

## 2021-07-25 RX ORDER — CYCLOBENZAPRINE HCL 5 MG
TABLET ORAL
Qty: 30 TABLET | Refills: 0 | Status: SHIPPED | OUTPATIENT
Start: 2021-07-25 | End: 2021-09-24 | Stop reason: SDUPTHER

## 2021-08-25 ENCOUNTER — IMMUNIZATION (OUTPATIENT)
Dept: VACCINE CLINIC | Facility: HOSPITAL | Age: 51
End: 2021-08-25

## 2021-08-25 PROCEDURE — 91300 HC SARSCOV02 VAC 30MCG/0.3ML IM: CPT | Performed by: INTERNAL MEDICINE

## 2021-08-25 PROCEDURE — 0001A: CPT | Performed by: INTERNAL MEDICINE

## 2021-09-15 ENCOUNTER — IMMUNIZATION (OUTPATIENT)
Dept: VACCINE CLINIC | Facility: HOSPITAL | Age: 51
End: 2021-09-15

## 2021-09-15 PROCEDURE — 91300 HC SARSCOV02 VAC 30MCG/0.3ML IM: CPT | Performed by: INTERNAL MEDICINE

## 2021-09-15 PROCEDURE — 0002A: CPT | Performed by: INTERNAL MEDICINE

## 2021-09-20 ENCOUNTER — APPOINTMENT (OUTPATIENT)
Dept: LAB | Facility: HOSPITAL | Age: 51
End: 2021-09-20

## 2021-09-23 ENCOUNTER — OFFICE VISIT (OUTPATIENT)
Dept: ONCOLOGY | Facility: CLINIC | Age: 51
End: 2021-09-23

## 2021-09-23 ENCOUNTER — LAB (OUTPATIENT)
Dept: LAB | Facility: HOSPITAL | Age: 51
End: 2021-09-23

## 2021-09-23 VITALS
RESPIRATION RATE: 16 BRPM | HEART RATE: 60 BPM | OXYGEN SATURATION: 99 % | SYSTOLIC BLOOD PRESSURE: 125 MMHG | TEMPERATURE: 98.2 F | DIASTOLIC BLOOD PRESSURE: 85 MMHG | BODY MASS INDEX: 29.71 KG/M2 | HEIGHT: 70 IN | WEIGHT: 207.5 LBS

## 2021-09-23 DIAGNOSIS — C18.9 MALIGNANT NEOPLASM OF COLON, UNSPECIFIED PART OF COLON (HCC): ICD-10-CM

## 2021-09-23 DIAGNOSIS — C20 RECTAL CANCER (HCC): ICD-10-CM

## 2021-09-23 DIAGNOSIS — C20 RECTAL CANCER (HCC): Primary | ICD-10-CM

## 2021-09-23 LAB
ALBUMIN SERPL-MCNC: 4.3 G/DL (ref 3.5–5.2)
ALBUMIN/GLOB SERPL: 1.7 G/DL (ref 1.1–2.4)
ALP SERPL-CCNC: 89 U/L (ref 38–116)
ALT SERPL W P-5'-P-CCNC: 19 U/L (ref 0–41)
ANION GAP SERPL CALCULATED.3IONS-SCNC: 7.8 MMOL/L (ref 5–15)
AST SERPL-CCNC: 28 U/L (ref 0–40)
BASOPHILS # BLD AUTO: 0.04 10*3/MM3 (ref 0–0.2)
BASOPHILS NFR BLD AUTO: 0.7 % (ref 0–1.5)
BILIRUB SERPL-MCNC: 0.4 MG/DL (ref 0.2–1.2)
BUN SERPL-MCNC: 15 MG/DL (ref 6–20)
BUN/CREAT SERPL: 16.3 (ref 7.3–30)
CALCIUM SPEC-SCNC: 9 MG/DL (ref 8.5–10.2)
CEA SERPL-MCNC: 5.34 NG/ML
CHLORIDE SERPL-SCNC: 103 MMOL/L (ref 98–107)
CO2 SERPL-SCNC: 28.2 MMOL/L (ref 22–29)
CREAT SERPL-MCNC: 0.92 MG/DL (ref 0.7–1.3)
DEPRECATED RDW RBC AUTO: 40 FL (ref 37–54)
EOSINOPHIL # BLD AUTO: 0.35 10*3/MM3 (ref 0–0.4)
EOSINOPHIL NFR BLD AUTO: 5.7 % (ref 0.3–6.2)
ERYTHROCYTE [DISTWIDTH] IN BLOOD BY AUTOMATED COUNT: 11.7 % (ref 12.3–15.4)
GFR SERPL CREATININE-BSD FRML MDRD: 86 ML/MIN/1.73
GLOBULIN UR ELPH-MCNC: 2.5 GM/DL (ref 1.8–3.5)
GLUCOSE SERPL-MCNC: 104 MG/DL (ref 74–124)
HCT VFR BLD AUTO: 47.4 % (ref 37.5–51)
HGB BLD-MCNC: 16 G/DL (ref 13–17.7)
IMM GRANULOCYTES # BLD AUTO: 0.06 10*3/MM3 (ref 0–0.05)
IMM GRANULOCYTES NFR BLD AUTO: 1 % (ref 0–0.5)
LYMPHOCYTES # BLD AUTO: 1.49 10*3/MM3 (ref 0.7–3.1)
LYMPHOCYTES NFR BLD AUTO: 24.5 % (ref 19.6–45.3)
MCH RBC QN AUTO: 31.2 PG (ref 26.6–33)
MCHC RBC AUTO-ENTMCNC: 33.8 G/DL (ref 31.5–35.7)
MCV RBC AUTO: 92.4 FL (ref 79–97)
MONOCYTES # BLD AUTO: 0.82 10*3/MM3 (ref 0.1–0.9)
MONOCYTES NFR BLD AUTO: 13.5 % (ref 5–12)
NEUTROPHILS NFR BLD AUTO: 3.33 10*3/MM3 (ref 1.7–7)
NEUTROPHILS NFR BLD AUTO: 54.6 % (ref 42.7–76)
NRBC BLD AUTO-RTO: 0 /100 WBC (ref 0–0.2)
PLATELET # BLD AUTO: 261 10*3/MM3 (ref 140–450)
PMV BLD AUTO: 8.8 FL (ref 6–12)
POTASSIUM SERPL-SCNC: 4.1 MMOL/L (ref 3.5–4.7)
PROT SERPL-MCNC: 6.8 G/DL (ref 6.3–8)
RBC # BLD AUTO: 5.13 10*6/MM3 (ref 4.14–5.8)
SODIUM SERPL-SCNC: 139 MMOL/L (ref 134–145)
WBC # BLD AUTO: 6.09 10*3/MM3 (ref 3.4–10.8)

## 2021-09-23 PROCEDURE — 80053 COMPREHEN METABOLIC PANEL: CPT

## 2021-09-23 PROCEDURE — 99213 OFFICE O/P EST LOW 20 MIN: CPT | Performed by: INTERNAL MEDICINE

## 2021-09-23 PROCEDURE — 85025 COMPLETE CBC W/AUTO DIFF WBC: CPT

## 2021-09-23 PROCEDURE — 36415 COLL VENOUS BLD VENIPUNCTURE: CPT

## 2021-09-23 PROCEDURE — 82378 CARCINOEMBRYONIC ANTIGEN: CPT | Performed by: INTERNAL MEDICINE

## 2021-09-23 NOTE — PROGRESS NOTES
Subjective     REASON FOR FOLLOW-UP:   1. 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 Fatty liver    3. 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 Genetics testing negative.    History of Present Illness   Patient with T2N0 adenocarcinoma of the upper rectum status post low anterior resection here for follow-up.  Given T2 N0 tumor, no role for chemoradiation. Followed by observation with imaging and labs.     He reports feeling well overall.  He met with Dr. Roa who had ordered a test to check for a weak heart.  He will follow up with him soon.  His surgery was on 03/01/19 with Dr. Gardner.  He has been eating well, but his diet consists of mostly fried foods.  I have advised him to eat a better diet.    We reviewed with patient the CT scans from 10/30/19 which was stable and negative for metastasis.    Genetics test was negative.    Interval history: Patient denies any complaints.  Last CT scan June 2021 which showed heterogeneous attenuation in the liver likely related to steatosis and is unchanged from prior exam stable mesenteric stranding is present and subcentimeter lymphadenopathy.  This was unchanged as well and was consistent with mesenteric panniculitis    Patient has not lost weight.  We discussed about obtaining a repeat CT in order to see if there is any worsening of the mesenteric stranding      Oncology history:  Patient is a 48-year-old gentleman who has had history of bright red blood per rectum since last several months.  It was going on for 4 months.  In the past he had similar symptoms and had found a stomach ulcer and was placed on Protonix at that time.  But this time he noted and went to work.  HEENT at work he started getting dizzy and then was taken to the emergency room by his wife.  He had lost 10-15 pounds in the past 4 months but he had a tendency to lose and gain.   He also felt fatigued.    In the emergency room he had a CT abdomen pelvis and that showed extensive fatty liver all of the other organs were unremarkable.  There was no evidence of any acute inflammation or abnormal enhancement.  He had history of having had a commode full of blood at work prior to his ER visit.  The emergency room discharged him with a follow-up and he was seen by Dr. Murphy.    Patient was found to have a 7 mm polyp in the descending colon and also a 10 mm polyp in the sigmoid colon which were resected.  He had a fungating non-obstructing medium-sized mass in the proximal rectum.  The mass was circumferential partially involving one half of the lumen circumference.  It was 3 cm in length and in diameter it was 21 mm.  A biopsy was done and it was consistent with moderately differentiated adenocarcinoma of the rectum.  It was thought to be in the upper rectum or proximal rectum.  He also had normal EGD which showed normal esophagus his gastric antrum was mildly erythematous which is biopsied and he had a normal examined duodenum.    On the pathology the colon polyp in the descending colon and the sigmoid colon were all tubular adenomas but the biopsy of the rectum showed moderately differentiated adenocarcinoma.  The antral biopsy was negative and there was no Helicobacter pylori.  Patient underwent MRI which also showed the mass to be in the high rectum and patient was seen by Dr. Gardner and a low anterior resection was done.    Pathology showed invasive moderately differentiated adenocarcinoma 2.5 x 2.3 x 0.5 cm and tumor invades the muscularis propria.  It is grade 2.  There is no lymphovascular space invasion or perineural invasion identified.  Margins were all clear.  0 of 12 lymph nodes were involved.  He has a T2 N0 moderately differentiated adenocarcinoma of the upper rectum.    A portion of the tumor with adjacent normal tissue was sent for MSI and BRAF testing and it is pending.   "Multiple sections of the tumor show invasive moderately differentiated adenocarcinoma invading the submucosa and into the muscularis propria.  It does not invade through the muscularis propria in the reviewed sections.  No definite lymphovascular space or perineural invasion seen.    CEA was 4.97 as of February 20, 2019 and this was prior to surgery.    He has been referred here to see further options of treatment.    His father has had a history of colon cancer at age 47.    MRI spine done on 04/18/2019  IMPRESSION:   No evidence for metastatic disease to the thoracic spine. The previously  noted lucent lesions within the T2 and T4 vertebrae are compatible with  Hemangiomas.    NM bone scan done on 04/01/2019:  IMPRESSION:  Scattered foci of radiotracer activity in the spine is  consistent with degenerative change. There is no scintigraphic evidence  of osseous metastasis.    CT neck, chest and abdomen pelvis done on 04/03/2019:  IMPRESSION:  1. Status post rectal resection.  2. Punctate nonobstructing stone in each kidney.  3. Fatty infiltration of the liver.  4. Two tiny smoothly marginated lucencies in the approximate T3 and T5  vertebrae. These are nonspecific and could represent small benign  lesions such as small hemangiomas. The possibility of 1 or more tiny  metastatic lesions is not excluded, but thought to be less likely. If  further evaluation is clinically indicated, MRI of the thoracic spine or  correlation with a short-term follow-up CT chest may be helpful.  5. No other definite evidence of metastatic disease.    06/12/19: Genetics testing negative.    Past Medical History:   Diagnosis Date   • Abdominal pain    • Allergic    • Allergic rhinitis    • Arthritis    • Colon cancer (CMS/HCC) 02/20/2019    Invasive moderately differentiated adenocarcinoma   • Colon polyps    • Diverticulosis    • Dry mouth    • Fatty liver 02/07/2019   • Gas pain    • H/O Acute GI bleeding 2015   • Insomnia     \"nerves\" "   • Kidney stones     in past   • Migraines    • Rectal bleeding    • Renal disorder    • Stiff neck     holding neck still to avoid dizziness   • Syncope    • Tattoos    • Ulcer of abdomen wall (CMS/HCC) 2013        Past Surgical History:   Procedure Laterality Date   • APPENDECTOMY N/A    • COLON RESECTION N/A 3/1/2019    Procedure: COLON RESECTION LAPAROSCOPIC LOW ANTERIOR, flexible sigmoidoscopy;  Surgeon: Matthew Gardner MD;  Location: [facility] MAIN OR;  Service: General   • COLONOSCOPY N/A 2/20/2019    Procedure: COLONOSCOPY with Hot and Cold Polypectomy and Biopsies;  Surgeon: Nino Murphy MD;  Location: [facility] ENDOSCOPY;  Service: Gastroenterology   • COLONOSCOPY N/A 8/14/2020    Procedure: COLONOSCOPY to cecum and TI with hot polypectomy;  Surgeon: Nino Murphy MD;  Location: [facility] ENDOSCOPY;  Service: Gastroenterology;  Laterality: N/A;  pre - hx colon ca  post - polyp, hx resection   • CYSTOSCOPY W/ URETERAL STENT PLACEMENT  06/04/2016    Dr. Teddy Kaba   • ENDOSCOPY N/A 2/20/2019    Procedure: ESOPHAGOGASTRODUODENOSCOPY with Biopsies;  Surgeon: Nino Murphy MD;  Location: [facility] ENDOSCOPY;  Service: Gastroenterology   • ENDOSCOPY AND COLONOSCOPY N/A 04/23/2015    tubular adenoma rectal polyp   • KNEE ARTHROSCOPY WITH OPEN LIGAMENT REPAIR Bilateral 1980, 1994    right knee: 1980, left knee: 1994   • ORIF TIBIA/FIBULA FRACTURES Left 1980's   • SHOULDER SURGERY Bilateral     Bicep repair on left, broken shoulder on right    • WISDOM TOOTH EXTRACTION Bilateral         Current Outpatient Medications on File Prior to Visit   Medication Sig Dispense Refill   • acetaminophen (TYLENOL) 500 MG tablet Take 500 mg by mouth Every 6 (Six) Hours As Needed for Mild Pain .     • CBD (cannabidiol) oral oil Take  by mouth.     • cyclobenzaprine (FLEXERIL) 5 MG tablet Take 1 tablet by mouth Every 8 (Eight) Hours As Needed for Muscle Spasms. 30 tablet 1   • diphenhydrAMINE (BENADRYL) 50 MG  capsule Take 50 mg by mouth Every 6 (Six) Hours As Needed for Itching.     • multivitamin with minerals (MULTIVITAMIN ADULTS PO) Take 1 tablet by mouth Daily.     • POTASSIUM PO Take 100 mg by mouth Every Other Day.     • traMADol (ULTRAM) 50 MG tablet TAKE ONE TABLET BY MOUTH EVERY 8 HOURS AS NEEDED FOR SEVERE PAIN 30 tablet 0   • [DISCONTINUED] cyclobenzaprine (FLEXERIL) 5 MG tablet TAKE ONE TABLET BY MOUTH THREE TIMES A DAY AS NEEDED FOR MUSCLE SPASMS 30 tablet 0   • [DISCONTINUED] cyclobenzaprine (FLEXERIL) 5 MG tablet TAKE ONE TABLET BY MOUTH THREE TIMES A DAY AS NEEDED FOR MUSCLE SPASMS 30 tablet 0   • [DISCONTINUED] doxycycline (VIBRAMYICN) 100 MG tablet Take 1 tablet by mouth 2 (Two) Times a Day. 20 tablet 0   • [DISCONTINUED] methylPREDNISolone (MEDROL) 4 MG dose pack follow package directions 21 tablet 0   • [DISCONTINUED] Unable to find 1 each 1 (One) Time. Med Name: CBD gummies       No current facility-administered medications on file prior to visit.        ALLERGIES:    Allergies   Allergen Reactions   • Contrast Dye Hives   • Erythromycin Unknown - High Severity     Father was allergic-patient was told not to take due to father's reaction (heart stopped)   • Penicillins Unknown - High Severity     Severe reaction as a child         Social History     Socioeconomic History   • Marital status:      Spouse name: Corrine   • Number of children: Not on file   • Years of education: High School   • Highest education level: Not on file   Tobacco Use   • Smoking status: Former Smoker     Types: Cigars   • Smokeless tobacco: Never Used   • Tobacco comment: a few cigars a year    Substance and Sexual Activity   • Alcohol use: Yes     Comment: social, not daily   • Drug use: No   • Sexual activity: Yes        Family History   Problem Relation Age of Onset   • Heart attack Mother    • Heart disease Mother    • Heart failure Mother    • Colon cancer Father 47   • Colon polyps Father    • Heart disease Father   "  • Diabetes Father    • Heart failure Father    • Malig Hyperthermia Neg Hx         Review of Systems   Constitutional: Positive for appetite change (09/23/21-Unchanged) and fatigue (09/23/21-Improved). Negative for chills, diaphoresis, fever and unexpected weight change.   HENT: Negative for hearing loss, sore throat and trouble swallowing.    Respiratory: Positive for shortness of breath (09/23/21-Improved). Negative for cough, chest tightness and wheezing.    Cardiovascular: Negative for chest pain, palpitations and leg swelling.   Gastrointestinal: Positive for abdominal pain (Lactose intolerant-09/23/21-Unchanged) and constipation (09/23/21-Unchanged). Negative for abdominal distention, diarrhea, nausea and vomiting.   Genitourinary: Positive for frequency (Urine dark and strong smell. 09/23/21-Unchanged). Negative for dysuria, hematuria and urgency.        Burning with urination   Musculoskeletal: Positive for arthralgias (Muscle spasms all over body -09/23/21-Unchanged) and back pain (09/23/21-Unchanged). Negative for joint swelling.        No muscle weakness.   Skin: Negative for rash and wound.   Neurological: Positive for weakness (09/23/21-Improved). Negative for seizures, syncope, speech difficulty, numbness and headaches.   Hematological: Negative for adenopathy. Does not bruise/bleed easily.   Psychiatric/Behavioral: Negative for behavioral problems, confusion and suicidal ideas.   All other systems reviewed and are negative.   I have reviewed and confirmed the accuracy of the ROS as documented by the MA/LPN/RN Magalis Chamorro MD        Objective      Vitals:    09/23/21 1002   BP: 125/85   Pulse: 60   Resp: 16   Temp: 98.2 °F (36.8 °C)   TempSrc: Temporal   SpO2: 99%   Weight: 94.1 kg (207 lb 8 oz)   Height: 177.8 cm (70\")   PainSc: 0-No pain     Current Status 9/23/2021   ECOG score 0       Physical Exam          CONSTITUTIONAL:  Vital signs reviewed.  No distress, looks comfortable.  EYES:  " Conjunctivae and lids unremarkable.  PERRLA  EARS,NOSE,MOUTH,THROAT:  Ears and nose appear unremarkable.  Lips, teeth, gums appear unremarkable.  RESPIRATORY:  Normal respiratory effort.  Lungs clear to auscultation bilaterally.  CARDIOVASCULAR:  Normal S1, S2.  No murmurs rubs or gallops.  No significant lower extremity edema.  GASTROINTESTINAL: Abdomen appears unremarkable.  Nontender.  No hepatomegaly.  No splenomegaly.  LYMPHATIC:  No cervical, supraclavicular, axillary lymphadenopathy.  SKIN:  Warm.  No rashes.  PSYCHIATRIC:  Normal judgment and insight.  Normal mood and affect.    RECENT LABS:  Hematology WBC   Date Value Ref Range Status   09/23/2021 6.09 3.40 - 10.80 10*3/mm3 Final     RBC   Date Value Ref Range Status   09/23/2021 5.13 4.14 - 5.80 10*6/mm3 Final     Hemoglobin   Date Value Ref Range Status   09/23/2021 16.0 13.0 - 17.7 g/dL Final     Hematocrit   Date Value Ref Range Status   09/23/2021 47.4 37.5 - 51.0 % Final     Platelets   Date Value Ref Range Status   09/23/2021 261 140 - 450 10*3/mm3 Final        CT NECK, CHEST, ABDOMEN, AND PELVIS WITHOUT IV CONTRAST. 11/23/20    IMPRESSION:  Stable examination. There is no evidence for metastatic  disease and there is no new abnormality.       EMERGENCY PA AND LATERAL CHEST X-RAY 07/07/2020     IMPRESSION:  Slightly low lung volumes with horizontal linear areas of  atelectasis or scarring at the lung bases, otherwise no active disease  is seen in the chest.       CT NECK, CHEST, ABDOMEN, AND PELVIS WITH IV CONTRAST 06/17/20  IMPRESSION:  There is no evidence for metastatic disease and there is no  new abnormality.    RECENT IMAGING:   10/30/19 - CT Chest Abdomen Pelvis  IMPRESSION:  Stable examination. There is no evidence for metastatic  disease and there is no new abnormality.    06/25/19 - Genetics Scan  Negative    Assessment/Plan    1.  T2 N0 moderately differentiated adenocarcinoma of the upper rectum, newly diagnosed status post low anterior  resection by Dr. Gardner.  Patient initially presented with bright red blood per rectum.  This was lasting 4-5 months.  He then underwent EGD colonoscopy by Dr. Murphy.  EGD was negative.  But the colonoscopy showed evidence of an lesion in the upper rectum which was 3 x 2.1 cm and it was  biopsied and consistent with moderately differentiated adenocarcinoma.  Patient was referred to Dr. Gardner and his CEA was 4.97 prior to surgery.  Patient then underwent EGD as well which was negative.  The 2 polyps in the descending colon were negative.    Patient underwent low anterior resection which on pathology shows a 2.5 cm mass extending on into the submucosa with margins clear and not involving the lymphovascular or perineural invasion.  There is 0 out of 12 lymph nodes positive.  And the tumor was situated in the upper rectum.  · Reviewed CT scan which is negative except 2 lucent lesions on T3 and T5, suggested MRI of the thoracic spine  · MRI done on 04/18/19 is normal without evidence of malignancy and lesion on T3 and T5 are hemangioma's.   · Bone scan is negative  · Followed up with observation with CT scans in 6 months.   · CT scans from November 23, 2020 are negative.  · Colonoscopy done August 2020 showed 9 mm polyp in the rectum resected and is consistent with adenoma    2.  Family history of colon cancer with father having colon cancer.  Patient is to follow up with  on June 6th, 2019    Plan   · Patient with mesenteric panniculitis and questionable lymphadenopathy with stranding on previous CT  · Also had hepatic steatosis  · We will repeat CT scan in order to make sure there is stability  ·  refer to GI to assure there is no concern for this mesenteric l lymphadenopathy/panniculitis   · follow-up with me in 3 months  · We will obtain CT scan in the next couple weeks weeks prior to follow-up with GI  ·       Magalis Chamorro MD      CC: Dr. Matthew Roa MD

## 2021-10-04 ENCOUNTER — HOSPITAL ENCOUNTER (OUTPATIENT)
Dept: CT IMAGING | Facility: HOSPITAL | Age: 51
Discharge: HOME | End: 2021-10-04
Admitting: INTERNAL MEDICINE

## 2021-10-04 DIAGNOSIS — C20 RECTAL CANCER (HCC): ICD-10-CM

## 2021-10-04 PROCEDURE — 0 DIATRIZOATE MEGLUMINE & SODIUM PER 1 ML: Performed by: INTERNAL MEDICINE

## 2021-10-04 PROCEDURE — 71250 CT THORAX DX C-: CPT

## 2021-10-04 PROCEDURE — 74176 CT ABD & PELVIS W/O CONTRAST: CPT

## 2021-10-04 RX ADMIN — DIATRIZOATE MEGLUMINE AND DIATRIZOATE SODIUM 30 ML: 660; 100 LIQUID ORAL; RECTAL at 10:30

## 2021-10-13 DIAGNOSIS — R25.2 LEG CRAMPS: ICD-10-CM

## 2021-10-13 DIAGNOSIS — M54.50 LUMBAR PAIN: ICD-10-CM

## 2021-10-13 RX ORDER — TRAMADOL HYDROCHLORIDE 50 MG/1
50 TABLET ORAL EVERY 8 HOURS PRN
Qty: 30 TABLET | Refills: 0 | Status: SHIPPED | OUTPATIENT
Start: 2021-10-13

## 2021-10-13 RX ORDER — CYCLOBENZAPRINE HCL 5 MG
5 TABLET ORAL EVERY 8 HOURS PRN
Qty: 30 TABLET | Refills: 1 | Status: SHIPPED | OUTPATIENT
Start: 2021-10-13 | End: 2022-03-14 | Stop reason: SDUPTHER

## 2022-02-23 ENCOUNTER — OFFICE VISIT (OUTPATIENT)
Dept: FAMILY MEDICINE CLINIC | Facility: CLINIC | Age: 52
End: 2022-02-23

## 2022-02-23 ENCOUNTER — TELEPHONE (OUTPATIENT)
Dept: FAMILY MEDICINE CLINIC | Facility: CLINIC | Age: 52
End: 2022-02-23

## 2022-02-23 VITALS
DIASTOLIC BLOOD PRESSURE: 60 MMHG | TEMPERATURE: 97.4 F | HEIGHT: 70 IN | OXYGEN SATURATION: 95 % | BODY MASS INDEX: 30.43 KG/M2 | WEIGHT: 212.6 LBS | SYSTOLIC BLOOD PRESSURE: 110 MMHG | HEART RATE: 58 BPM

## 2022-02-23 DIAGNOSIS — M19.90 ARTHRITIS: Primary | ICD-10-CM

## 2022-02-23 DIAGNOSIS — M54.50 LUMBAR PAIN: ICD-10-CM

## 2022-02-23 PROCEDURE — FORMSMISC: Performed by: FAMILY MEDICINE

## 2022-02-23 PROCEDURE — 99213 OFFICE O/P EST LOW 20 MIN: CPT | Performed by: FAMILY MEDICINE

## 2022-02-23 RX ORDER — GABAPENTIN 300 MG/1
CAPSULE ORAL
Qty: 60 CAPSULE | Refills: 0 | Status: SHIPPED | OUTPATIENT
Start: 2022-02-23 | End: 2022-03-09

## 2022-02-23 RX ORDER — METHYLPREDNISOLONE 4 MG/1
TABLET ORAL
Qty: 21 TABLET | Refills: 0 | Status: SHIPPED | OUTPATIENT
Start: 2022-02-23

## 2022-02-23 NOTE — PROGRESS NOTES
"SUBJECTIVE:  The patient is a 51-year-old white male.  He has recurrent lumbar pain.  He is here for [place] updates.  His back pain is getting worse.  He is unable to lift or bend without a great deal of pain.  In the past he has seen pain management.  Occasionally his leg gets numb.  His tramadol is no longer working.    PAST MEDICAL HISTORY:  Reviewed.    REVIEW OF SYSTEMS:  Please see above.  All others reviewed and are negative.      OBJECTIVE:   /60 (BP Location: Left arm, Patient Position: Sitting, Cuff Size: Adult)   Pulse 58   Temp 97.4 °F (36.3 °C) (Infrared)   Ht 177.8 cm (70\")   Wt 96.4 kg (212 lb 9.6 oz)   SpO2 95%   BMI 30.50 kg/m²    Vitals signs are reviewed and are stable.    General:  Well-nourished.  Alert and oriented x3 in no acute distress.  HEENT: PERRLA.   Neck:  Supple.   Lungs:  Clear.    Heart:  Regular rate and rhythm  Back: Pain with flexion extension.  Straight leg raise is negative..   Abdomen:   Soft, nontender.   Extremities:  No cyanosis, clubbing or edema.   Neurological:  Grossly intact without motor or sensory deficits.     ASSESSMENT:      Diagnoses and all orders for this visit:    1. Arthritis (Primary)  -     gabapentin (NEURONTIN) 300 MG capsule; 1 p.o. nightly  Dispense: 60 capsule; Refill: 0    2. Lumbar pain  -     gabapentin (NEURONTIN) 300 MG capsule; 1 p.o. nightly  Dispense: 60 capsule; Refill: 0    Other orders  -     methylPREDNISolone (MEDROL) 4 MG dose pack; follow package directions  Dispense: 21 tablet; Refill: 0         PLAN: He is going to call his pain management doctor and set up an appointment.  He will need to be on restricted duty that involves no lifting or bending.  He has a complete physical scheduled later in the spring/early summer.  He will follow-up with that.  He will call if problems in the meantime.    Dictated utilizing Dragon dictation.    "

## 2022-02-23 NOTE — TELEPHONE ENCOUNTER
Caller: Jamil Kamara    Relationship: Self    Best call back number: [phone]    What form or medical record are you requesting: WORK NOTE, NO LIFTING OR BENDING. THEY NEED A TIMELINE FOR THIS PATIENT IS REQUESTING 1 MONTH    Who is requesting this form or medical record from you: [employer]     How would you like to receive the form or medical records (pick-up, mail, fax):   If fax, what is the fax number: [phone]    Timeframe paperwork needed: TODAY IF POSSIBLE     Additional notes: [facility] WILL NOT ACCEPT THIS WITHOUT A TIMELINE

## 2022-03-01 ENCOUNTER — TELEPHONE (OUTPATIENT)
Dept: FAMILY MEDICINE CLINIC | Facility: CLINIC | Age: 52
End: 2022-03-01

## 2022-03-01 DIAGNOSIS — M54.50 LUMBAR PAIN WITH RADIATION DOWN LEG: Primary | ICD-10-CM

## 2022-03-01 DIAGNOSIS — M79.606 LUMBAR PAIN WITH RADIATION DOWN LEG: Primary | ICD-10-CM

## 2022-03-09 DIAGNOSIS — M54.50 LUMBAR PAIN: ICD-10-CM

## 2022-03-09 DIAGNOSIS — M19.90 ARTHRITIS: ICD-10-CM

## 2022-03-09 RX ORDER — GABAPENTIN 300 MG/1
300 CAPSULE ORAL 3 TIMES DAILY
Qty: 90 CAPSULE | Refills: 0 | Status: SHIPPED | OUTPATIENT
Start: 2022-03-09 | End: 2022-03-14 | Stop reason: SDUPTHER

## 2022-03-14 ENCOUNTER — TELEPHONE (OUTPATIENT)
Dept: FAMILY MEDICINE CLINIC | Facility: CLINIC | Age: 52
End: 2022-03-14

## 2022-03-14 DIAGNOSIS — M54.50 LUMBAR PAIN: ICD-10-CM

## 2022-03-14 NOTE — TELEPHONE ENCOUNTER
Caller: Corrine Kamara    Relationship: Emergency Contact    Best call back number: [phone]    What medication are you requesting: cyclobenzaprine (FLEXERIL) 5 MG tablet  90 DAY SUPPLY AND CHANGE THE PHARMACY ON gabapentin (NEURONTIN) 300 MG capsuleabapentin (NEURONTIN) 300 MG capsule    If a prescription is needed, what is your preferred pharmacy and phone number: [facility] - [place], [place] - [address]  - [phone] [facility] [phone] FX     Additional notes:  PLEASE CONTACT PATIENT'S WIFE CORRINE ONCE THESE HAVE BEEN SENT.

## 2022-03-15 RX ORDER — GABAPENTIN 300 MG/1
300 CAPSULE ORAL 3 TIMES DAILY
Qty: 90 CAPSULE | Refills: 0 | Status: SHIPPED | OUTPATIENT
Start: 2022-03-15

## 2022-03-15 RX ORDER — CYCLOBENZAPRINE HCL 5 MG
5 TABLET ORAL EVERY 8 HOURS PRN
Qty: 90 TABLET | Refills: 1 | Status: SHIPPED | OUTPATIENT
Start: 2022-03-15 | End: 2022-07-20 | Stop reason: SDUPTHER

## 2022-03-25 RX ORDER — HYDROCODONE BITARTRATE AND ACETAMINOPHEN 5; 325 MG/1; MG/1
TABLET ORAL
Qty: 20 TABLET | Refills: 0 | Status: SHIPPED | OUTPATIENT
Start: 2022-03-25 | End: 2022-04-08 | Stop reason: SDUPTHER

## 2022-03-31 ENCOUNTER — TELEPHONE (OUTPATIENT)
Dept: FAMILY MEDICINE CLINIC | Facility: CLINIC | Age: 52
End: 2022-03-31

## 2022-04-01 ENCOUNTER — TELEPHONE (OUTPATIENT)
Dept: FAMILY MEDICINE CLINIC | Facility: CLINIC | Age: 52
End: 2022-04-01

## 2022-04-01 DIAGNOSIS — M54.50 LUMBAR PAIN: Primary | ICD-10-CM

## 2022-04-01 NOTE — TELEPHONE ENCOUNTER
Caller: Jamil Kamara    Relationship: Self    Best call back number:[phone]    Who are you requesting to speak with (clinical staff, provider,  specific staff member):     What was the call regarding: WENT TO PAIN MANAGEMENT YESTERDAY AND WILL NOT BE RECEIVING SHOTS IN HIS BACK UNTIL 4/26 AND NEEDS A NEW RESTRICTION PAPERWORK SENT TO HIS WORK [employer]

## 2022-04-04 NOTE — TELEPHONE ENCOUNTER
Foot Sprain   AMBULATORY CARE:   A foot sprain  is a stretched or torn ligament in the foot or toe  Ligaments are tough tissues that connect bones  Common symptoms include the following:   · Bruising or changes in skin color    · Inability to put weight on your foot    · Pain, tenderness, and swelling    Seek care immediately if:   · You have numbness or tingling below the injury, such as in your toes  · The skin on your injured foot is blue or pale  · You have increased pain, even after you take pain medicine  Call your doctor if:   · You have new weakness in your foot  · You have new or increased swelling in your foot  · You have new or increased stiffness when you move your injured foot  · You have questions or concerns about your condition or care  Treatment for a foot sprain  may include the following:  · A support device , such as a brace, cast, or splint  These devices limit movement and protect further injury  · NSAIDs , such as ibuprofen, help decrease swelling, pain, and fever  This medicine is available with or without a doctor's order  NSAIDs can cause stomach bleeding or kidney problems in certain people  If you take blood thinner medicine, always ask if NSAIDs are safe for you  Always read the medicine label and follow directions  Do not give these medicines to children under 10months of age without direction from your child's healthcare provider  Care for a foot sprain:   · Rest your foot  Limit movement in your sprained foot for the first 2 to 3 days  You might need crutches to take weight off your injured foot as it heals  Use crutches as directed  · Apply ice  on your foot for 15 to 20 minutes every hour or as directed  Use an ice pack, or put crushed ice in a plastic bag  Cover it with a towel  Ice helps prevent tissue damage and decreases swelling and pain  · Compress your foot    You may need to use tape or an elastic bandage to support your foot if you Note faxed to [facility] patient informed   have a mild sprain  You may need a splint on your foot for support if your sprain is severe  Wear your splint for as many days as directed  · Elevate your foot  above the level of your heart as often as you can  This will help decrease swelling and pain  Prop your foot on pillows or blankets to keep it elevated comfortably  · Exercise  your foot as directed to improve your strength and help decrease stiffness  The exercises and physical therapy can help restore strength and increase the range of motion in your foot  Ask your healthcare provider when you can return to your normal activities or play sports  Prevent another foot sprain:   · Warm up and stretch before you exercise  · Do not exercise when you feel pain or are tired  · Wear equipment to protect yourself when you play sports  Follow up with your doctor as directed:  Write down your questions so you remember to ask them during your visits  © Copyright [address] 2022 Information is for End User's use only and may not be sold, redistributed or otherwise used for commercial purposes  All illustrations and images included in CareNotes® are the copyrighted property of A D A M , Inc  or Darlyn Sun  The above information is an  only  It is not intended as medical advice for individual conditions or treatments  Talk to your doctor, nurse or pharmacist before following any medical regimen to see if it is safe and effective for you

## 2022-04-08 RX ORDER — HYDROCODONE BITARTRATE AND ACETAMINOPHEN 5; 325 MG/1; MG/1
TABLET ORAL
Qty: 20 TABLET | Refills: 0 | Status: SHIPPED | OUTPATIENT
Start: 2022-04-08 | End: 2022-07-20 | Stop reason: SDUPTHER

## 2022-04-11 ENCOUNTER — TELEPHONE (OUTPATIENT)
Dept: FAMILY MEDICINE CLINIC | Facility: CLINIC | Age: 52
End: 2022-04-11

## 2022-04-11 NOTE — TELEPHONE ENCOUNTER
Caller: PAIN MANAGEMENT    Relationship to patient: Other    Best call back number: [phone]    Patient is needing: THEY ARE NEEDING A RECENT MRI OR ANYTHING DONE ON HIS BACK SENT OVER.    CAN FAX TO :[phone]

## 2022-04-12 DIAGNOSIS — M54.50 LUMBAR PAIN: Primary | ICD-10-CM

## 2022-04-12 DIAGNOSIS — M54.10 RADICULAR PAIN: ICD-10-CM

## 2022-04-12 NOTE — TELEPHONE ENCOUNTER
Spoke with Pain Management will fax most recent MRI we have, they will order additional studies as needed.

## 2022-06-14 ENCOUNTER — TELEPHONE (OUTPATIENT)
Dept: FAMILY MEDICINE CLINIC | Facility: CLINIC | Age: 52
End: 2022-06-14

## 2022-06-14 NOTE — TELEPHONE ENCOUNTER
Caller: Jamil Kamara    Relationship: Self    Best call back number: [phone]    What is the medical concern/diagnosis: LOWER BACK PAIN    What specialty or service is being requested:   'S RECOMMENDATION    What is the provider, practice or medical service name: DR. DUKES'S REC[facility]MEND[facility]    What is the office location: UNKNOWN    What is the office phone number: UNKNOWN     Any additional details:PATIENT HAS BEEN HAVING ISSUES WITH HIS BACK SENSE HE WAS [age] AND STATES RECENTLY HE HAS NOT BEEN ABLE TO KEEP THE PAIN DOWN.    PATIENT WOULD LIKE A REFERRAL TO SEE A SPECIALIST TO HELP HIM.    PATIENT TAKES CORTIZONE SHOTS AND HAS DONE PHYSICAL THERAPY AND STATES NOTHING IS HELPING.    PATIENT DID STATE HE DOES NOT WISH TO SEE  ONLY BECAUSE PATIENT DOES NOT WANT TO ONLY BE PRESCRIBED PAIN PILLS FOR THIS, HE WOULD LIKE OTHER METHODS FIRST.    PATIENT WOULD LIKE TO DISCUSS MAYBE GOING TO [facility] OR 'S RECOMMENDATION.    PLEASE CALL PATIENT AS SOON AS POSSIBLE.    PATIENT HAD MRI DONE YESTERDAY 6/13 AT [facility] AND HAS DISC FOR  THAT HE WAS TOLD TO GIVE TO .

## 2022-06-16 DIAGNOSIS — M54.50 LUMBAR PAIN: Primary | ICD-10-CM

## 2022-06-16 NOTE — TELEPHONE ENCOUNTER
Called [facility] for MRI results, left patient message Dr Roa will review and referral has been placed.

## 2022-06-21 ENCOUNTER — TELEPHONE (OUTPATIENT)
Dept: FAMILY MEDICINE CLINIC | Facility: CLINIC | Age: 52
End: 2022-06-21

## 2022-06-21 NOTE — TELEPHONE ENCOUNTER
Caller: Jamil Kamara    Relationship: Self    Best call back number: [phone]    What form or medical record are you requesting: WORK RESTRICTION NOTE    Who is requesting this form or medical record from you: EMPLOYER     How would you like to receive the form or medical records (pick-up, mail, fax):   If fax, what is the fax number:  If mail, what is the address:   If pick-up, provide patient with address and location details    Timeframe paperwork needed:   Additional notes: PATIENT STATES THAT PREVIOUS RESTRICTION NOTE HAS . PATIENT ASKS THAT NOTE BE FAXED TO [email or web address]. PATIENT STATES PROVIDERS OFFICE HAS THE FAX NUMBER. PLEASE ADVISE

## 2022-06-21 NOTE — TELEPHONE ENCOUNTER
Caller: Jamil Kamara    Relationship to patient: Self    Best call back number: [phone]    Patient is needing: PATIENT CALLED BACK WITH FAX NUMBER: [phone]

## 2022-07-21 RX ORDER — CYCLOBENZAPRINE HCL 5 MG
5 TABLET ORAL EVERY 8 HOURS PRN
Qty: 90 TABLET | Refills: 1 | Status: SHIPPED | OUTPATIENT
Start: 2022-07-21

## 2022-07-21 RX ORDER — HYDROCODONE BITARTRATE AND ACETAMINOPHEN 5; 325 MG/1; MG/1
TABLET ORAL
Qty: 20 TABLET | Refills: 0 | Status: SHIPPED | OUTPATIENT
Start: 2022-07-21

## 2022-09-21 ENCOUNTER — TELEPHONE (OUTPATIENT)
Dept: FAMILY MEDICINE CLINIC | Facility: CLINIC | Age: 52
End: 2022-09-21

## 2022-09-21 NOTE — TELEPHONE ENCOUNTER
Caller: BREEZY    Relationship: SELF    Best call back number: [phone]     What is the best time to reach you: ANYTIME    Who are you requesting to speak with (clinical staff, provider,  specific staff member): ANA MARÍA        What was the call regarding: Floresita Dodge, sorry to bother you with this. Medical says my paperwork needs to be changed from six times on frequency to seven times on frequency do you have a copy of that still in your system where you can just change that and fax it over to them at fax # [phone]. Sorry for the inconvenience.    PATIENT IS CALLING BACK IN ABOUT THIS [employer] MESSAGE. HE STATES THAT HE STILL IS NOT APPROVED BECAUSE THE FREQUENCY HAS NOT BEEN FIXED TO SEVEN TIMES A DAY AND HE REALLY NEEDS THIS SENT OVER ASAP.      Do you require a callback:  YES

## 2023-07-31 ENCOUNTER — TELEPHONE (OUTPATIENT)
Dept: ONCOLOGY | Facility: CLINIC | Age: 53
End: 2023-07-31
Payer: COMMERCIAL

## 2023-09-25 ENCOUNTER — LAB (OUTPATIENT)
Dept: LAB | Facility: HOSPITAL | Age: 53
End: 2023-09-25
Payer: COMMERCIAL

## 2023-09-25 ENCOUNTER — TELEPHONE (OUTPATIENT)
Dept: GASTROENTEROLOGY | Facility: CLINIC | Age: 53
End: 2023-09-25
Payer: COMMERCIAL

## 2023-09-25 ENCOUNTER — OFFICE VISIT (OUTPATIENT)
Dept: ONCOLOGY | Facility: CLINIC | Age: 53
End: 2023-09-25
Payer: COMMERCIAL

## 2023-09-25 VITALS
DIASTOLIC BLOOD PRESSURE: 82 MMHG | HEART RATE: 56 BPM | SYSTOLIC BLOOD PRESSURE: 123 MMHG | BODY MASS INDEX: 29.06 KG/M2 | TEMPERATURE: 98.7 F | WEIGHT: 203 LBS | OXYGEN SATURATION: 96 % | HEIGHT: 70 IN | RESPIRATION RATE: 18 BRPM

## 2023-09-25 DIAGNOSIS — R11.2 NAUSEA AND VOMITING, UNSPECIFIED VOMITING TYPE: ICD-10-CM

## 2023-09-25 DIAGNOSIS — C20 RECTAL CARCINOMA: ICD-10-CM

## 2023-09-25 DIAGNOSIS — C20 RECTAL CARCINOMA: Primary | ICD-10-CM

## 2023-09-25 LAB
ALBUMIN SERPL-MCNC: 4.1 G/DL (ref 3.5–5.2)
ALBUMIN/GLOB SERPL: 1.9 G/DL
ALP SERPL-CCNC: 80 U/L (ref 39–117)
ALT SERPL W P-5'-P-CCNC: 15 U/L (ref 1–41)
ANION GAP SERPL CALCULATED.3IONS-SCNC: 9.2 MMOL/L (ref 5–15)
AST SERPL-CCNC: 26 U/L (ref 1–40)
BASOPHILS # BLD AUTO: 0.03 10*3/MM3 (ref 0–0.2)
BASOPHILS NFR BLD AUTO: 0.5 % (ref 0–1.5)
BILIRUB SERPL-MCNC: 0.5 MG/DL (ref 0–1.2)
BUN SERPL-MCNC: 16 MG/DL (ref 6–20)
BUN/CREAT SERPL: 17 (ref 7–25)
CALCIUM SPEC-SCNC: 8.7 MG/DL (ref 8.6–10.5)
CHLORIDE SERPL-SCNC: 104 MMOL/L (ref 98–107)
CO2 SERPL-SCNC: 24.8 MMOL/L (ref 22–29)
CREAT SERPL-MCNC: 0.94 MG/DL (ref 0.7–1.3)
DEPRECATED RDW RBC AUTO: 41.3 FL (ref 37–54)
EGFRCR SERPLBLD CKD-EPI 2021: 96.3 ML/MIN/1.73
EOSINOPHIL # BLD AUTO: 0.2 10*3/MM3 (ref 0–0.4)
EOSINOPHIL NFR BLD AUTO: 3.3 % (ref 0.3–6.2)
ERYTHROCYTE [DISTWIDTH] IN BLOOD BY AUTOMATED COUNT: 11.9 % (ref 12.3–15.4)
GLOBULIN UR ELPH-MCNC: 2.2 GM/DL
GLUCOSE SERPL-MCNC: 93 MG/DL (ref 65–99)
HCT VFR BLD AUTO: 45.9 % (ref 37.5–51)
HGB BLD-MCNC: 15.1 G/DL (ref 13–17.7)
IMM GRANULOCYTES # BLD AUTO: 0.02 10*3/MM3 (ref 0–0.05)
IMM GRANULOCYTES NFR BLD AUTO: 0.3 % (ref 0–0.5)
LYMPHOCYTES # BLD AUTO: 1.31 10*3/MM3 (ref 0.7–3.1)
LYMPHOCYTES NFR BLD AUTO: 21.7 % (ref 19.6–45.3)
MCH RBC QN AUTO: 31.7 PG (ref 26.6–33)
MCHC RBC AUTO-ENTMCNC: 32.9 G/DL (ref 31.5–35.7)
MCV RBC AUTO: 96.2 FL (ref 79–97)
MONOCYTES # BLD AUTO: 0.8 10*3/MM3 (ref 0.1–0.9)
MONOCYTES NFR BLD AUTO: 13.2 % (ref 5–12)
NEUTROPHILS NFR BLD AUTO: 3.68 10*3/MM3 (ref 1.7–7)
NEUTROPHILS NFR BLD AUTO: 61 % (ref 42.7–76)
NRBC BLD AUTO-RTO: 0 /100 WBC (ref 0–0.2)
PLATELET # BLD AUTO: 250 10*3/MM3 (ref 140–450)
PMV BLD AUTO: 9 FL (ref 6–12)
POTASSIUM SERPL-SCNC: 4.6 MMOL/L (ref 3.5–5.2)
PROT SERPL-MCNC: 6.3 G/DL (ref 6–8.5)
RBC # BLD AUTO: 4.77 10*6/MM3 (ref 4.14–5.8)
SODIUM SERPL-SCNC: 138 MMOL/L (ref 136–145)
WBC NRBC COR # BLD: 6.04 10*3/MM3 (ref 3.4–10.8)

## 2023-09-25 PROCEDURE — 99213 OFFICE O/P EST LOW 20 MIN: CPT | Performed by: INTERNAL MEDICINE

## 2023-09-25 PROCEDURE — 85025 COMPLETE CBC W/AUTO DIFF WBC: CPT

## 2023-09-25 PROCEDURE — 36415 COLL VENOUS BLD VENIPUNCTURE: CPT

## 2023-09-25 PROCEDURE — 80053 COMPREHEN METABOLIC PANEL: CPT

## 2023-09-25 NOTE — PROGRESS NOTES
Received call back from  Anh.  He states he has put off keeping up with his appointments and thought Dr. Chamorro may want a CT scan before she sees him.  Let him know Dr. Chamorro is out of the office today, but will review with her upon her return and let him know.  Patient verbalized understanding.

## 2023-09-25 NOTE — TELEPHONE ENCOUNTER
We received urgent referral for vomiting and nausea and rectal carcinoma but there are no spots available can you work him in ?

## 2023-09-26 ENCOUNTER — TELEPHONE (OUTPATIENT)
Dept: GASTROENTEROLOGY | Facility: CLINIC | Age: 53
End: 2023-09-26
Payer: COMMERCIAL

## 2023-09-26 NOTE — TELEPHONE ENCOUNTER
Left message advising of the appointment. Advised to call back to confirm the appointment time will work for him.

## 2023-09-26 NOTE — TELEPHONE ENCOUNTER
Hub staff attempted to follow warm transfer process and was unsuccessful     Caller: Jamil Kamara    Relationship to patient: Self    Best call back number: [phone]    Patient is needing: PATIENT MISSED CALL FOR SCHEDULING FOR TOMORROW AT 8:45AM.  CONFIRMED THIS WILL WORK FOR HIM.  PLEASE REACH BACK OUT TO CONFIRM.  OKAY TO LEAVE .

## 2023-09-27 ENCOUNTER — OFFICE VISIT (OUTPATIENT)
Dept: GASTROENTEROLOGY | Facility: CLINIC | Age: 53
End: 2023-09-27
Payer: COMMERCIAL

## 2023-09-27 ENCOUNTER — TELEPHONE (OUTPATIENT)
Dept: GASTROENTEROLOGY | Facility: CLINIC | Age: 53
End: 2023-09-27
Payer: COMMERCIAL

## 2023-09-27 VITALS
WEIGHT: 202.2 LBS | DIASTOLIC BLOOD PRESSURE: 77 MMHG | SYSTOLIC BLOOD PRESSURE: 115 MMHG | OXYGEN SATURATION: 96 % | HEART RATE: 58 BPM | BODY MASS INDEX: 28.95 KG/M2 | TEMPERATURE: 97.1 F | HEIGHT: 70 IN

## 2023-09-27 DIAGNOSIS — R10.84 GENERALIZED ABDOMINAL PAIN: ICD-10-CM

## 2023-09-27 DIAGNOSIS — R12 HEARTBURN: ICD-10-CM

## 2023-09-27 DIAGNOSIS — Z85.048 PERSONAL HISTORY OF RECTAL CANCER: ICD-10-CM

## 2023-09-27 DIAGNOSIS — R19.4 CHANGE IN BOWEL HABITS: ICD-10-CM

## 2023-09-27 DIAGNOSIS — R63.4 WEIGHT LOSS, ABNORMAL: Primary | ICD-10-CM

## 2023-09-27 DIAGNOSIS — Z80.0 FAMILY HISTORY OF COLON CANCER IN FATHER: ICD-10-CM

## 2023-09-27 NOTE — TELEPHONE ENCOUNTER
COLONOSCOPY /egd 11/10/2023 arrive at 130pm   [facility]  . mailed prep instructions to verified address on file....miralax OK FOR HUB TO READ

## 2023-09-27 NOTE — PROGRESS NOTES
"Chief Complaint   Patient presents with    Constipation    Heartburn       HPI    Jamil Kamara is a  53 y.o. male here to reestablish care as a new over 3-year patient with complaints of heartburn and constipation.    This patient follows with Dr. Murphy but not seen since 2020 he is new to me today.    Past medical history of rectal cancer, colon polyps, chronic back pain, arthritis, fatty liver along with family history of colon cancer in his father.    Currently patient complains of generalized abdominal discomfort along with worsening of baseline constipation.  He is passing small piecemeal like stools daily with feelings of incomplete evacuation without rectal bleeding or rectal pain.  Is currently taking MiraLAX with some improvement.  Additionally reports unintentional 20 pound weight loss over the last several months.    He has intermittent heartburn with dietary indiscretions.  No dysphagia, odynophagia, poor appetite or nausea.  He reports rare vomiting with sinus drainage.    He takes Norco twice daily for chronic low back pain.    His hematologist has ordered CT of the chest abdomen and pelvis.     Recent hemogram and LFTs normal.    Additional data reviewed:    C-scope 2020 - Normal ileum, patent end-to-end colo-colonic anastomosis appeared healthy.  Polyp in the rectum.  Nonbleeding internal hemorrhoids.  Recall 5 years.    C-scope 2019 - Malignant tumor in the proximal rectum, polyps, normal ileum.    EGD 2019 - Gastritis otherwise normal    Past Medical History:   Diagnosis Date    Abdominal pain     Allergic     Allergic rhinitis     Arthritis     Colon cancer 02/20/2019    Invasive moderately differentiated adenocarcinoma    Colon polyps     Diverticulosis     Dry mouth     Fatty liver 02/07/2019    Gas pain     H/O Acute GI bleeding 2015    Insomnia     \"nerves\"    Kidney stones     in past    Migraines     Rectal bleeding     Renal disorder     Stiff neck     holding neck still to avoid " dizziness    Syncope     Tattoos     Ulcer of abdomen wall 2013       Past Surgical History:   Procedure Laterality Date    APPENDECTOMY N/A     COLON RESECTION N/A 3/1/2019    Procedure: COLON RESECTION LAPAROSCOPIC LOW ANTERIOR, flexible sigmoidoscopy;  Surgeon: Matthew Gardner MD;  Location: [facility] MAIN OR;  Service: General    COLONOSCOPY N/A 2/20/2019    Procedure: COLONOSCOPY with Hot and Cold Polypectomy and Biopsies;  Surgeon: Nino Murphy MD;  Location: [facility] ENDOSCOPY;  Service: Gastroenterology    COLONOSCOPY N/A 8/14/2020    Procedure: COLONOSCOPY to cecum and TI with hot polypectomy;  Surgeon: Nino Murphy MD;  Location: [facility] ENDOSCOPY;  Service: Gastroenterology;  Laterality: N/A;  pre - hx colon ca  post - polyp, hx resection    CYSTOSCOPY W/ URETERAL STENT PLACEMENT  06/04/2016    Dr. Teddy Kaba    ENDOSCOPY N/A 2/20/2019    Procedure: ESOPHAGOGASTRODUODENOSCOPY with Biopsies;  Surgeon: Nino Murphy MD;  Location: [facility] ENDOSCOPY;  Service: Gastroenterology    ENDOSCOPY AND COLONOSCOPY N/A 04/23/2015    tubular adenoma rectal polyp    KNEE ARTHROSCOPY WITH OPEN LIGAMENT REPAIR Bilateral 1980, 1994    right knee: 1980, left knee: 1994    ORIF TIBIA/FIBULA FRACTURES Left 1980's    SHOULDER SURGERY Bilateral     Bicep repair on left, broken shoulder on right     WISDOM TOOTH EXTRACTION Bilateral        Scheduled Meds:     Continuous Infusions: No current facility-administered medications for this visit.      PRN Meds:     Allergies   Allergen Reactions    Contrast Dye (Echo Or Unknown Ct/Mr) Hives    Erythromycin Unknown - High Severity     Father was allergic-patient was told not to take due to father's reaction (heart stopped)    Penicillins Unknown - High Severity     Severe reaction as a child    Gabapentin Hives       Social History     Socioeconomic History    Marital status:      Spouse name: Corrine    Years of education: High School   Tobacco Use     Smoking status: Former     Types: Cigars    Smokeless tobacco: Never    Tobacco comments:     a few cigars a year    Substance and Sexual Activity    Alcohol use: Yes     Comment: social, not daily    Drug use: No    Sexual activity: Yes       Family History   Problem Relation Age of Onset    Heart attack Mother     Heart disease Mother     Heart failure Mother     Colon cancer Father 47    Colon polyps Father     Heart disease Father     Diabetes Father     Heart failure Father     Malig Hyperthermia Neg Hx        Review of Systems   Constitutional:  Positive for unexpected weight change.   Gastrointestinal:  Positive for abdominal pain and constipation.     Vitals:    09/27/23 0853   BP: 115/77   Pulse: 58   Temp: 97.1 °F (36.2 °C)   SpO2: 96%       Physical Exam  Constitutional:       Appearance: He is well-developed.   Abdominal:      General: Bowel sounds are normal. There is no distension.      Palpations: Abdomen is soft. There is no mass.      Tenderness: There is no abdominal tenderness. There is no guarding.      Hernia: No hernia is present.   Skin:     General: Skin is warm and dry.      Capillary Refill: Capillary refill takes less than 2 seconds.   Neurological:      Mental Status: He is alert and oriented to person, place, and time.   Psychiatric:         Behavior: Behavior normal.     Assessment    Diagnoses and all orders for this visit:    1. Weight loss, abnormal (Primary)  -     Case Request; Standing  -     Obtain Informed Consent; Standing  -     Verify Bowel Prep Was Successful; Standing  -     Give Tap Water Enema If Bowel Prep Insufficient; Standing  -     Case Request    2. Generalized abdominal pain  -     Case Request; Standing  -     Obtain Informed Consent; Standing  -     Verify Bowel Prep Was Successful; Standing  -     Give Tap Water Enema If Bowel Prep Insufficient; Standing  -     Case Request    3. Change in bowel habits  -     Case Request; Standing  -     Obtain Informed  Consent; Standing  -     Verify Bowel Prep Was Successful; Standing  -     Give Tap Water Enema If Bowel Prep Insufficient; Standing  -     Case Request    4. Heartburn  -     Case Request; Standing  -     Obtain Informed Consent; Standing  -     Verify Bowel Prep Was Successful; Standing  -     Give Tap Water Enema If Bowel Prep Insufficient; Standing  -     Case Request    5. Personal history of rectal cancer    6. Family history of colon cancer in father       Plan    Schedule expedited EGD and colonoscopy with Dr. Murphy  Risks and benefits reviewed with the patient all questions were answered  Offered more aggressive treatment for constipation but patient declines at the moment as he reports is difficult to leave the line forward.  I did encourage him to call back if he changes his mind we can prescribe accordingly.  Await CT findings ordered by his oncologist  Further recommendations and follow-up pending endoscopic evaluation         CALLUM Esparza  [facility]  [address] [place] [address]  Office: [phone]

## 2023-10-09 ENCOUNTER — HOSPITAL ENCOUNTER (OUTPATIENT)
Dept: CT IMAGING | Facility: HOSPITAL | Age: 53
Discharge: HOME | End: 2023-10-09
Admitting: INTERNAL MEDICINE
Payer: COMMERCIAL

## 2023-10-09 DIAGNOSIS — R11.2 NAUSEA AND VOMITING, UNSPECIFIED VOMITING TYPE: ICD-10-CM

## 2023-10-09 DIAGNOSIS — C20 RECTAL CARCINOMA: ICD-10-CM

## 2023-10-09 PROCEDURE — 74176 CT ABD & PELVIS W/O CONTRAST: CPT

## 2023-10-09 PROCEDURE — 71250 CT THORAX DX C-: CPT

## 2023-10-17 ENCOUNTER — OFFICE VISIT (OUTPATIENT)
Dept: ONCOLOGY | Facility: CLINIC | Age: 53
End: 2023-10-17
Payer: COMMERCIAL

## 2023-10-17 ENCOUNTER — LAB (OUTPATIENT)
Dept: LAB | Facility: HOSPITAL | Age: 53
End: 2023-10-17
Payer: COMMERCIAL

## 2023-10-17 VITALS
HEIGHT: 70 IN | OXYGEN SATURATION: 95 % | HEART RATE: 57 BPM | RESPIRATION RATE: 18 BRPM | SYSTOLIC BLOOD PRESSURE: 121 MMHG | BODY MASS INDEX: 29.01 KG/M2 | TEMPERATURE: 99.3 F | DIASTOLIC BLOOD PRESSURE: 84 MMHG | WEIGHT: 202.6 LBS

## 2023-10-17 DIAGNOSIS — C20 RECTAL CARCINOMA: ICD-10-CM

## 2023-10-17 DIAGNOSIS — C20 RECTAL CARCINOMA: Primary | ICD-10-CM

## 2023-10-17 LAB
ALBUMIN SERPL-MCNC: 4.3 G/DL (ref 3.5–5.2)
ALBUMIN/GLOB SERPL: 1.7 G/DL
ALP SERPL-CCNC: 77 U/L (ref 39–117)
ALT SERPL W P-5'-P-CCNC: 18 U/L (ref 1–41)
ANION GAP SERPL CALCULATED.3IONS-SCNC: 11.2 MMOL/L (ref 5–15)
AST SERPL-CCNC: 26 U/L (ref 1–40)
BASOPHILS # BLD AUTO: 0.06 10*3/MM3 (ref 0–0.2)
BASOPHILS NFR BLD AUTO: 0.8 % (ref 0–1.5)
BILIRUB SERPL-MCNC: 0.7 MG/DL (ref 0–1.2)
BUN SERPL-MCNC: 18 MG/DL (ref 6–20)
BUN/CREAT SERPL: 18.6 (ref 7–25)
CALCIUM SPEC-SCNC: 9 MG/DL (ref 8.6–10.5)
CHLORIDE SERPL-SCNC: 102 MMOL/L (ref 98–107)
CO2 SERPL-SCNC: 26.8 MMOL/L (ref 22–29)
CREAT SERPL-MCNC: 0.97 MG/DL (ref 0.7–1.3)
DEPRECATED RDW RBC AUTO: 40.6 FL (ref 37–54)
EGFRCR SERPLBLD CKD-EPI 2021: 92.8 ML/MIN/1.73
EOSINOPHIL # BLD AUTO: 0.27 10*3/MM3 (ref 0–0.4)
EOSINOPHIL NFR BLD AUTO: 3.4 % (ref 0.3–6.2)
ERYTHROCYTE [DISTWIDTH] IN BLOOD BY AUTOMATED COUNT: 11.7 % (ref 12.3–15.4)
GLOBULIN UR ELPH-MCNC: 2.6 GM/DL
GLUCOSE SERPL-MCNC: 104 MG/DL (ref 65–99)
HCT VFR BLD AUTO: 48.4 % (ref 37.5–51)
HGB BLD-MCNC: 16.5 G/DL (ref 13–17.7)
IMM GRANULOCYTES # BLD AUTO: 0.04 10*3/MM3 (ref 0–0.05)
IMM GRANULOCYTES NFR BLD AUTO: 0.5 % (ref 0–0.5)
LYMPHOCYTES # BLD AUTO: 1.44 10*3/MM3 (ref 0.7–3.1)
LYMPHOCYTES NFR BLD AUTO: 18.1 % (ref 19.6–45.3)
MCH RBC QN AUTO: 32.4 PG (ref 26.6–33)
MCHC RBC AUTO-ENTMCNC: 34.1 G/DL (ref 31.5–35.7)
MCV RBC AUTO: 95.1 FL (ref 79–97)
MONOCYTES # BLD AUTO: 0.98 10*3/MM3 (ref 0.1–0.9)
MONOCYTES NFR BLD AUTO: 12.3 % (ref 5–12)
NEUTROPHILS NFR BLD AUTO: 5.18 10*3/MM3 (ref 1.7–7)
NEUTROPHILS NFR BLD AUTO: 64.9 % (ref 42.7–76)
NRBC BLD AUTO-RTO: 0 /100 WBC (ref 0–0.2)
PLATELET # BLD AUTO: 254 10*3/MM3 (ref 140–450)
PMV BLD AUTO: 8.8 FL (ref 6–12)
POTASSIUM SERPL-SCNC: 4.5 MMOL/L (ref 3.5–5.2)
PROT SERPL-MCNC: 6.9 G/DL (ref 6–8.5)
RBC # BLD AUTO: 5.09 10*6/MM3 (ref 4.14–5.8)
SODIUM SERPL-SCNC: 140 MMOL/L (ref 136–145)
WBC NRBC COR # BLD: 7.97 10*3/MM3 (ref 3.4–10.8)

## 2023-10-17 PROCEDURE — 80053 COMPREHEN METABOLIC PANEL: CPT

## 2023-10-17 PROCEDURE — 85025 COMPLETE CBC W/AUTO DIFF WBC: CPT

## 2023-10-17 PROCEDURE — 36415 COLL VENOUS BLD VENIPUNCTURE: CPT

## 2023-10-17 PROCEDURE — 99214 OFFICE O/P EST MOD 30 MIN: CPT | Performed by: INTERNAL MEDICINE

## 2023-10-17 NOTE — PROGRESS NOTES
Subjective     REASON FOR FOLLOW-UP:   1. 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 Fatty liver    3. 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 Genetics testing negative.    History of Present Illness   Patient with T2N0 adenocarcinoma of the upper rectum status post low anterior resection here for follow-up.  Given T2 N0 tumor, no role for chemoradiation. Followed by observation with imaging and labs.     He reports feeling well overall.  He met with Dr. Roa who had ordered a test to check for a weak heart.  He will follow up with him soon.  His surgery was on 03/01/19 with Dr. Gardner.  He has been eating well, but his diet consists of mostly fried foods.  I have advised him to eat a better diet.    We reviewed with patient the CT scans from 10/30/19 which was stable and negative for metastasis.    Genetics test was negative.    Interval history: Patient denies any complaints.  Patient is 4 years out from his diagnosis of rectal cancer.  He has been having alternating constipation and diarrhea.  He saw Tabitha roy and they have scheduled him for colonoscopy and EGD as of November 17, 2023.  He underwent a CT scan of the chest abdomen pelvis which is negative as of September 9, 2023.  Bone scan was ordered but not done as they did not have the contrast for the bone scan.  Patient has not lost weight.  He has chronic abdominal discomfort.  Nothing new.  He has chronic back pain for which she has taken epidural injections in the past.  He works at [employer] where his work is very active.  He has not lost weight.  He does take red meat and have asked him to take less red meat.  There is a family history of colon cancer in his dad.  But his genetic testing was negative.    Oncology history:  Patient is a 48-year-old gentleman who has had history of bright red blood per rectum since  last several months.  It was going on for 4 months.  In the past he had similar symptoms and had found a stomach ulcer and was placed on Protonix at that time.  But this time he noted and went to work.  HEENT at work he started getting dizzy and then was taken to the emergency room by his wife.  He had lost 10-15 pounds in the past 4 months but he had a tendency to lose and gain.  He also felt fatigued.    In the emergency room he had a CT abdomen pelvis and that showed extensive fatty liver all of the other organs were unremarkable.  There was no evidence of any acute inflammation or abnormal enhancement.  He had history of having had a commode full of blood at work prior to his ER visit.  The emergency room discharged him with a follow-up and he was seen by Dr. Murphy.    Patient was found to have a 7 mm polyp in the descending colon and also a 10 mm polyp in the sigmoid colon which were resected.  He had a fungating non-obstructing medium-sized mass in the proximal rectum.  The mass was circumferential partially involving one half of the lumen circumference.  It was 3 cm in length and in diameter it was 21 mm.  A biopsy was done and it was consistent with moderately differentiated adenocarcinoma of the rectum.  It was thought to be in the upper rectum or proximal rectum.  He also had normal EGD which showed normal esophagus his gastric antrum was mildly erythematous which is biopsied and he had a normal examined duodenum.    On the pathology the colon polyp in the descending colon and the sigmoid colon were all tubular adenomas but the biopsy of the rectum showed moderately differentiated adenocarcinoma.  The antral biopsy was negative and there was no Helicobacter pylori.  Patient underwent MRI which also showed the mass to be in the high rectum and patient was seen by Dr. Gardner and a low anterior resection was done.    Pathology showed invasive moderately differentiated adenocarcinoma 2.5 x 2.3 x 0.5 cm and  tumor invades the muscularis propria.  It is grade 2.  There is no lymphovascular space invasion or perineural invasion identified.  Margins were all clear.  0 of 12 lymph nodes were involved.  He has a T2 N0 moderately differentiated adenocarcinoma of the upper rectum.    A portion of the tumor with adjacent normal tissue was sent for MSI and BRAF testing and it is pending.  Multiple sections of the tumor show invasive moderately differentiated adenocarcinoma invading the submucosa and into the muscularis propria.  It does not invade through the muscularis propria in the reviewed sections.  No definite lymphovascular space or perineural invasion seen.    CEA was 4.97 as of February 20, 2019 and this was prior to surgery.    He has been referred here to see further options of treatment.    His father has had a history of colon cancer at age 47.    MRI spine done on 04/18/2019  IMPRESSION:   No evidence for metastatic disease to the thoracic spine. The previously  noted lucent lesions within the T2 and T4 vertebrae are compatible with  Hemangiomas.    NM bone scan done on 04/01/2019:  IMPRESSION:  Scattered foci of radiotracer activity in the spine is  consistent with degenerative change. There is no scintigraphic evidence  of osseous metastasis.    CT neck, chest and abdomen pelvis done on 04/03/2019:  IMPRESSION:  1. Status post rectal resection.  2. Punctate nonobstructing stone in each kidney.  3. Fatty infiltration of the liver.  4. Two tiny smoothly marginated lucencies in the approximate T3 and T5  vertebrae. These are nonspecific and could represent small benign  lesions such as small hemangiomas. The possibility of 1 or more tiny  metastatic lesions is not excluded, but thought to be less likely. If  further evaluation is clinically indicated, MRI of the thoracic spine or  correlation with a short-term follow-up CT chest may be helpful.  5. No other definite evidence of metastatic disease.    06/12/19:  "Genetics testing negative.    Past Medical History:   Diagnosis Date    Abdominal pain     Allergic     Allergic rhinitis     Arthritis     Colon cancer 02/20/2019    Invasive moderately differentiated adenocarcinoma    Colon polyps     Diverticulosis     Dry mouth     Fatty liver 02/07/2019    Gas pain     H/O Acute GI bleeding 2015    Insomnia     \"nerves\"    Kidney stones     in past    Migraines     Rectal bleeding     Renal disorder     Stiff neck     holding neck still to avoid dizziness    Syncope     Tattoos     Ulcer of abdomen wall 2013        Past Surgical History:   Procedure Laterality Date    APPENDECTOMY N/A     COLON RESECTION N/A 3/1/2019    Procedure: COLON RESECTION LAPAROSCOPIC LOW ANTERIOR, flexible sigmoidoscopy;  Surgeon: Matthew Gardner MD;  Location: [facility] MAIN OR;  Service: General    COLONOSCOPY N/A 2/20/2019    Procedure: COLONOSCOPY with Hot and Cold Polypectomy and Biopsies;  Surgeon: Nino Murphy MD;  Location: [facility] ENDOSCOPY;  Service: Gastroenterology    COLONOSCOPY N/A 8/14/2020    Procedure: COLONOSCOPY to cecum and TI with hot polypectomy;  Surgeon: Nino Murphy MD;  Location: [facility] ENDOSCOPY;  Service: Gastroenterology;  Laterality: N/A;  pre - hx colon ca  post - polyp, hx resection    CYSTOSCOPY W/ URETERAL STENT PLACEMENT  06/04/2016    Dr. Teddy Kaba    ENDOSCOPY N/A 2/20/2019    Procedure: ESOPHAGOGASTRODUODENOSCOPY with Biopsies;  Surgeon: Nino Murphy MD;  Location: [facility] ENDOSCOPY;  Service: Gastroenterology    ENDOSCOPY AND COLONOSCOPY N/A 04/23/2015    tubular adenoma rectal polyp    KNEE ARTHROSCOPY WITH OPEN LIGAMENT REPAIR Bilateral 1980, 1994    right knee: 1980, left knee: 1994    ORIF TIBIA/FIBULA FRACTURES Left 1980's    SHOULDER SURGERY Bilateral     Bicep repair on left, broken shoulder on right     WISDOM TOOTH EXTRACTION Bilateral         Current Outpatient Medications on File Prior to Visit   Medication Sig Dispense Refill "    acetaminophen (TYLENOL) 500 MG tablet Take 1 tablet by mouth Every 6 (Six) Hours As Needed for Mild Pain.      CBD (cannabidiol) oral oil Take  by mouth.      cyclobenzaprine (FLEXERIL) 5 MG tablet Take 1 tablet by mouth Every 8 (Eight) Hours As Needed for Muscle Spasms. 90 tablet 1    diphenhydrAMINE (BENADRYL) 50 MG capsule Take 1 capsule by mouth Every 6 (Six) Hours As Needed for Itching.      HYDROcodone-acetaminophen (NORCO) 5-325 MG per tablet 1 po bid prn 20 tablet 0    HYDROcodone-acetaminophen (NORCO) 7.5-325 MG per tablet Take 1 tablet by mouth Every 6 (Six) Hours As Needed for Moderate Pain.      methylPREDNISolone (MEDROL) 4 MG dose pack follow package directions 21 tablet 0    multivitamin with minerals tablet tablet Take 1 tablet by mouth Daily.      mupirocin (BACTROBAN) 2 % ointment       POTASSIUM PO Take 100 mg by mouth Every Other Day.      traMADol (ULTRAM) 50 MG tablet Take 1 tablet by mouth Every 8 (Eight) Hours As Needed for Severe Pain . 30 tablet 0     No current facility-administered medications on file prior to visit.        ALLERGIES:    Allergies   Allergen Reactions    Contrast Dye (Echo Or Unknown Ct/Mr) Hives    Erythromycin Unknown - High Severity     Father was allergic-patient was told not to take due to father's reaction (heart stopped)    Penicillins Unknown - High Severity     Severe reaction as a child    Gabapentin Hives        Social History     Socioeconomic History    Marital status:      Spouse name: Corrine    Years of education: High School   Tobacco Use    Smoking status: Former     Types: Cigars    Smokeless tobacco: Never    Tobacco comments:     a few cigars a year    Substance and Sexual Activity    Alcohol use: Yes     Comment: social, not daily    Drug use: No    Sexual activity: Yes        Family History   Problem Relation Age of Onset    Heart attack Mother     Heart disease Mother     Heart failure Mother     Colon cancer Father 47    Colon polyps  "Father     Heart disease Father     Diabetes Father     Heart failure Father     Malig Hyperthermia Neg Hx         Review of Systems   Constitutional:  Negative for chills, diaphoresis, fever and unexpected weight change. Appetite change: 09/23/21-Unchanged. Fatigue: 09/23/21-Improved.  HENT:  Negative for hearing loss, sore throat and trouble swallowing.    Respiratory:  Negative for cough, chest tightness and wheezing. Shortness of breath: 09/23/21-Improved.   Cardiovascular:  Negative for chest pain, palpitations and leg swelling.   Gastrointestinal:  Positive for abdominal pain (Lactose intolerant-09/23/21-Unchanged) and constipation (09/23/21-Unchanged). Negative for abdominal distention, diarrhea, nausea and vomiting.   Genitourinary:  Negative for dysuria, hematuria and urgency. Frequency: Urine dark and strong smell. 09/23/21-Unchanged.       Burning with urination   Musculoskeletal:  Positive for back pain (09/23/21-Unchanged). Negative for joint swelling. Arthralgias: Muscle spasms all over body -09/23/21-Unchanged.       No muscle weakness.   Skin:  Negative for rash and wound.   Neurological:  Negative for seizures, syncope, speech difficulty, numbness and headaches. Weakness: 09/23/21-Improved.  Hematological:  Negative for adenopathy. Does not bruise/bleed easily.   Psychiatric/Behavioral:  Negative for behavioral problems, confusion and suicidal ideas.    All other systems reviewed and are negative.   I have reviewed and confirmed the accuracy of the ROS as documented by the MA/LPN/RN Magalis Chamorro MD        Objective      Vitals:    10/17/23 0809   BP: 121/84   Pulse: 57   Resp: 18   Temp: 99.3 °F (37.4 °C)   TempSrc: Temporal   SpO2: 95%   Weight: 91.9 kg (202 lb 9.6 oz)   Height: 177.8 cm (70\")   PainSc: 0-No pain         10/17/2023     8:06 AM   Current Status   ECOG score 0       Physical Exam          CONSTITUTIONAL:  Vital signs reviewed.  No distress, looks comfortable.  EYES:  Conjunctivae " and lids unremarkable.  PERRLA  EARS,NOSE,MOUTH,THROAT:  Ears and nose appear unremarkable.  Lips, teeth, gums appear unremarkable.  RESPIRATORY:  Normal respiratory effort.  Lungs clear to auscultation bilaterally.  CARDIOVASCULAR:  Normal S1, S2.  No murmurs rubs or gallops.  No significant lower extremity edema.  GASTROINTESTINAL: Abdomen appears unremarkable.  Nontender.  No hepatomegaly.  No splenomegaly.  LYMPHATIC:  No cervical, supraclavicular, axillary lymphadenopathy.  SKIN:  Warm.  No rashes.  PSYCHIATRIC:  Normal judgment and insight.  Normal mood and affect.  I have reexamined the patient and the results are consistent with the previously documented exam. Magalis Chamorro MD     RECENT LABS:  Hematology WBC   Date Value Ref Range Status   10/17/2023 7.97 3.40 - 10.80 10*3/mm3 Final     RBC   Date Value Ref Range Status   10/17/2023 5.09 4.14 - 5.80 10*6/mm3 Final     Hemoglobin   Date Value Ref Range Status   10/17/2023 16.5 13.0 - 17.7 g/dL Final     Hematocrit   Date Value Ref Range Status   10/17/2023 48.4 37.5 - 51.0 % Final     Platelets   Date Value Ref Range Status   10/17/2023 254 140 - 450 10*3/mm3 Final        CT NECK, CHEST, ABDOMEN, AND PELVIS WITHOUT IV CONTRAST. 11/23/20    IMPRESSION:  Stable examination. There is no evidence for metastatic  disease and there is no new abnormality.       EMERGENCY PA AND LATERAL CHEST X-RAY 07/07/2020     IMPRESSION:  Slightly low lung volumes with horizontal linear areas of  atelectasis or scarring at the lung bases, otherwise no active disease  is seen in the chest.       CT NECK, CHEST, ABDOMEN, AND PELVIS WITH IV CONTRAST 06/17/20  IMPRESSION:  There is no evidence for metastatic disease and there is no  new abnormality.    RECENT IMAGING:   10/30/19 - CT Chest Abdomen Pelvis  IMPRESSION:  Stable examination. There is no evidence for metastatic  disease and there is no new abnormality.    06/25/19 - Genetics Scan  Negative    Assessment & Plan    1.   T2 N0 moderately differentiated adenocarcinoma of the upper rectum, newly diagnosed status post low anterior resection by Dr. Gardner.  Patient initially presented with bright red blood per rectum.  This was lasting 4-5 months.  He then underwent EGD colonoscopy by Dr. Murphy.  EGD was negative.  But the colonoscopy showed evidence of an lesion in the upper rectum which was 3 x 2.1 cm and it was  biopsied and consistent with moderately differentiated adenocarcinoma.  Patient was referred to Dr. Gardner and his CEA was 4.97 prior to surgery.  Patient then underwent EGD as well which was negative.  The 2 polyps in the descending colon were negative.    Patient underwent low anterior resection which on pathology shows a 2.5 cm mass extending on into the submucosa with margins clear and not involving the lymphovascular or perineural invasion.  There is 0 out of 12 lymph nodes positive.  And the tumor was situated in the upper rectum.  Reviewed CT scan which is negative except 2 lucent lesions on T3 and T5, suggested MRI of the thoracic spine  MRI done on 04/18/19 is normal without evidence of malignancy and lesion on T3 and T5 are hemangioma's.   Bone scan is negative  Followed up with observation with CT scans in 6 months.   CT scans from November 23, 2020 are negative.  Colonoscopy done August 2020 showed 9 mm polyp in the rectum resected and is consistent with adenoma  October 17, 2023: Patient is having constipation alternating with diarrhea.  He is set up for EGD colonoscopy November 17 by Dr. Murphy.  He had a CT scan of the chest abdomen pelvis which is negative.  Bone scan could not be done.  Because there was no contrast.  However there is no bone lesions on the CT scan.  He has chronic back pain which is unchanged.  CEA is pending.    2.  Family history of colon cancer with father having colon cancer.  Patient is to follow up with  on June 6th, 2019    Plan   Reviewed CT scan which is negative for  any metastasis  Bone scan could not be done secondary to lack of contrast  EGD and colonoscopy are scheduled on November 17 by Dr. Murphy  Follow-up with me in 3 months with labs.  Will obtain CEA on the blood in the lab.    Magalis Chamorro MD    I spent 30 total minutes, face-to-face, caring for Jamil today. Greater than 50% of this time involved counseling and/or coordination of care as documented within this note.    CC: Dr. Matthew Roa MD

## 2023-11-10 ENCOUNTER — ANESTHESIA EVENT (OUTPATIENT)
Dept: GASTROENTEROLOGY | Facility: HOSPITAL | Age: 53
End: 2023-11-10
Payer: COMMERCIAL

## 2023-11-10 ENCOUNTER — ANESTHESIA (OUTPATIENT)
Dept: GASTROENTEROLOGY | Facility: HOSPITAL | Age: 53
End: 2023-11-10
Payer: COMMERCIAL

## 2023-11-10 ENCOUNTER — HOSPITAL ENCOUNTER (OUTPATIENT)
Facility: HOSPITAL | Age: 53
Setting detail: HOSPITAL OUTPATIENT SURGERY
Discharge: HOME | End: 2023-11-10
Attending: INTERNAL MEDICINE | Admitting: INTERNAL MEDICINE
Payer: COMMERCIAL

## 2023-11-10 VITALS
SYSTOLIC BLOOD PRESSURE: 120 MMHG | BODY MASS INDEX: 28.2 KG/M2 | RESPIRATION RATE: 16 BRPM | OXYGEN SATURATION: 99 % | HEART RATE: 52 BPM | WEIGHT: 197 LBS | DIASTOLIC BLOOD PRESSURE: 79 MMHG | HEIGHT: 70 IN

## 2023-11-10 DIAGNOSIS — R63.4 WEIGHT LOSS, ABNORMAL: ICD-10-CM

## 2023-11-10 DIAGNOSIS — R10.84 GENERALIZED ABDOMINAL PAIN: ICD-10-CM

## 2023-11-10 DIAGNOSIS — R12 HEARTBURN: ICD-10-CM

## 2023-11-10 DIAGNOSIS — R19.4 CHANGE IN BOWEL HABITS: ICD-10-CM

## 2023-11-10 PROCEDURE — 25010000002 PROPOFOL 10 MG/ML EMULSION: Performed by: ANESTHESIOLOGY

## 2023-11-10 PROCEDURE — 45378 DIAGNOSTIC COLONOSCOPY: CPT | Performed by: INTERNAL MEDICINE

## 2023-11-10 PROCEDURE — S0260 H&P FOR SURGERY: HCPCS | Performed by: INTERNAL MEDICINE

## 2023-11-10 PROCEDURE — 25810000003 LACTATED RINGERS PER 1000 ML: Performed by: ANESTHESIOLOGY

## 2023-11-10 PROCEDURE — 43235 EGD DIAGNOSTIC BRUSH WASH: CPT | Performed by: INTERNAL MEDICINE

## 2023-11-10 PROCEDURE — 25810000003 LACTATED RINGERS PER 1000 ML: Performed by: INTERNAL MEDICINE

## 2023-11-10 RX ORDER — PROPOFOL 10 MG/ML
VIAL (ML) INTRAVENOUS AS NEEDED
Status: DISCONTINUED | OUTPATIENT
Start: 2023-11-10 | End: 2023-11-10 | Stop reason: SURG

## 2023-11-10 RX ORDER — LIDOCAINE HYDROCHLORIDE 20 MG/ML
INJECTION, SOLUTION INFILTRATION; PERINEURAL AS NEEDED
Status: DISCONTINUED | OUTPATIENT
Start: 2023-11-10 | End: 2023-11-10 | Stop reason: SURG

## 2023-11-10 RX ORDER — SODIUM CHLORIDE, SODIUM LACTATE, POTASSIUM CHLORIDE, CALCIUM CHLORIDE 600; 310; 30; 20 MG/100ML; MG/100ML; MG/100ML; MG/100ML
INJECTION, SOLUTION INTRAVENOUS CONTINUOUS PRN
Status: DISCONTINUED | OUTPATIENT
Start: 2023-11-10 | End: 2023-11-10 | Stop reason: SURG

## 2023-11-10 RX ORDER — EPHEDRINE SULFATE 50 MG/ML
INJECTION, SOLUTION INTRAVENOUS AS NEEDED
Status: DISCONTINUED | OUTPATIENT
Start: 2023-11-10 | End: 2023-11-10 | Stop reason: SURG

## 2023-11-10 RX ORDER — PANTOPRAZOLE SODIUM 40 MG/1
40 TABLET, DELAYED RELEASE ORAL DAILY
Qty: 90 TABLET | Refills: 3 | Status: SHIPPED | OUTPATIENT
Start: 2023-11-10

## 2023-11-10 RX ORDER — SODIUM CHLORIDE, SODIUM LACTATE, POTASSIUM CHLORIDE, CALCIUM CHLORIDE 600; 310; 30; 20 MG/100ML; MG/100ML; MG/100ML; MG/100ML
30 INJECTION, SOLUTION INTRAVENOUS CONTINUOUS PRN
Status: DISCONTINUED | OUTPATIENT
Start: 2023-11-10 | End: 2023-11-10 | Stop reason: HOSPADM

## 2023-11-10 RX ADMIN — EPHEDRINE SULFATE 10 MG: 50 INJECTION INTRAVENOUS at 15:42

## 2023-11-10 RX ADMIN — PROPOFOL 200 MG: 10 INJECTION, EMULSION INTRAVENOUS at 15:37

## 2023-11-10 RX ADMIN — SODIUM CHLORIDE, POTASSIUM CHLORIDE, SODIUM LACTATE AND CALCIUM CHLORIDE: 600; 310; 30; 20 INJECTION, SOLUTION INTRAVENOUS at 15:32

## 2023-11-10 RX ADMIN — LIDOCAINE HYDROCHLORIDE 60 MG: 20 INJECTION, SOLUTION INFILTRATION; PERINEURAL at 15:37

## 2023-11-10 RX ADMIN — PROPOFOL 200 MCG/KG/MIN: 10 INJECTION, EMULSION INTRAVENOUS at 15:37

## 2023-11-10 RX ADMIN — SODIUM CHLORIDE, POTASSIUM CHLORIDE, SODIUM LACTATE AND CALCIUM CHLORIDE 30 ML/HR: 600; 310; 30; 20 INJECTION, SOLUTION INTRAVENOUS at 13:41

## 2023-11-10 NOTE — BRIEF OP NOTE
ESOPHAGOGASTRODUODENOSCOPY, COLONOSCOPY  Progress Note    Jamil Kamara  11/10/2023    Pre-op Diagnosis:   Weight loss, abnormal [R63.4]  Generalized abdominal pain [R10.84]  Change in bowel habits [R19.4]  Heartburn [R12]       Post-Op Diagnosis Codes:     * Weight loss, abnormal [R63.4]     * Generalized abdominal pain [R10.84]     * Change in bowel habits [R19.4]     * Heartburn [R12]     * Constipation [564.0]     * Internal hemorrhoids [K64.8]    Procedure/CPT® Codes:        Procedure(s):  ESOPHAGOGASTRODUODENOSCOPY  COLONOSCOPY into cecum              Surgeon(s):  Nino Murphy MD    Anesthesia: Monitored Anesthesia Care    Staff:   Endo Technician: Jarett Garnett  Endo Nurse: Peyton Medina RN         Estimated Blood Loss: none    Urine Voided: * No values recorded between 11/10/2023  3:34 PM and 11/10/2023  4:05 PM *    Specimens:                None          Drains: * No LDAs found *    Findings: Colonoscopy to the terminal ileum with normal ileum mucosa.  Marked retained stool throughout the colon noted.  Scar noted in the rectum from previous resection with internal hemorrhoids seen.  EGD with normal esophagus stomach and duodenal mucosa throughout.        Complications: None          Nino Murphy MD     Date: 11/10/2023  Time: 16:09 EST

## 2023-11-10 NOTE — ANESTHESIA PREPROCEDURE EVALUATION
Anesthesia Evaluation     Patient summary reviewed and Nursing notes reviewed   NPO Solid Status: > 8 hours  NPO Liquid Status: > 4 hours           Airway   Mallampati: III  TM distance: <3 FB  Neck ROM: full  Possible difficult intubation  Comment: Airway deep and anterior/CMAC D used in past  Dental - normal exam     Pulmonary - normal exam   (+) a smoker Former,  Cardiovascular     Rhythm: regular  Rate: abnormal      PE comment: SB/rate 50    Neuro/Psych  (+) headaches, syncope    ROS Comment: Migraines  GI/Hepatic/Renal/Endo    (+) GERD, GI bleeding , liver disease fatty liver disease, renal disease- stones    Musculoskeletal     (+) neck stiffness  Abdominal  - normal exam   Substance History      OB/GYN          Other   arthritis,   history of cancer      Other Comment: Hx rectal CA              Anesthesia Plan    ASA 2     MAC     intravenous induction     Anesthetic plan, risks, benefits, and alternatives have been provided, discussed and informed consent has been obtained with: patient.    CODE STATUS:

## 2023-11-10 NOTE — H&P
"[facility]  Pre Procedure History & Physical    Chief Complaint:   Constipation and reflux    Subjective     HPI:   Patient 53-year-old male with history of rectal carcinoma and reflux presenting with new onset constipation and ongoing heartburn here for EGD and colonoscopy    Past Medical History:   Past Medical History:   Diagnosis Date    Abdominal pain     Allergic     Allergic rhinitis     Arthritis     Colon cancer 02/20/2019    Invasive moderately differentiated adenocarcinoma    Colon polyps     Constipation     Diverticulosis     Dry mouth     Fatty liver 02/07/2019    Gas pain     GERD (gastroesophageal reflux disease)     H/O Acute GI bleeding 2015    Insomnia     \"nerves\"    Kidney stones     in past    Migraines     Rectal bleeding     Renal disorder     Stiff neck     holding neck still to avoid dizziness    Syncope     Tattoos     Ulcer of abdomen wall 2013       Past Surgical History:  Past Surgical History:   Procedure Laterality Date    APPENDECTOMY N/A     COLON RESECTION N/A 3/1/2019    Procedure: COLON RESECTION LAPAROSCOPIC LOW ANTERIOR, flexible sigmoidoscopy;  Surgeon: Matthew Gardner MD;  Location: [facility] MAIN OR;  Service: General    COLONOSCOPY N/A 2/20/2019    Procedure: COLONOSCOPY with Hot and Cold Polypectomy and Biopsies;  Surgeon: Nino Murphy MD;  Location: [facility] ENDOSCOPY;  Service: Gastroenterology    COLONOSCOPY N/A 8/14/2020    Procedure: COLONOSCOPY to cecum and TI with hot polypectomy;  Surgeon: Nino Murphy MD;  Location: [facility] ENDOSCOPY;  Service: Gastroenterology;  Laterality: N/A;  pre - hx colon ca  post - polyp, hx resection    CYSTOSCOPY W/ URETERAL STENT PLACEMENT  06/04/2016    Dr. Teddy Kaba    ENDOSCOPY N/A 2/20/2019    Procedure: ESOPHAGOGASTRODUODENOSCOPY with Biopsies;  Surgeon: Nino Murphy MD;  Location: [facility] ENDOSCOPY;  Service: Gastroenterology    ENDOSCOPY AND COLONOSCOPY N/A 04/23/2015    tubular " "adenoma rectal polyp    KNEE ARTHROSCOPY WITH OPEN LIGAMENT REPAIR Bilateral 1980, 1994    right knee: 1980, left knee: 1994    ORIF TIBIA/FIBULA FRACTURES Left 1980's    SHOULDER SURGERY Bilateral     Bicep repair on left, broken shoulder on right     WISDOM TOOTH EXTRACTION Bilateral        Family History:  Family History   Problem Relation Age of Onset    Heart attack Mother     Heart disease Mother     Heart failure Mother     Colon cancer Father 47    Colon polyps Father     Heart disease Father     Diabetes Father     Heart failure Father     Malig Hyperthermia Neg Hx        Social History:   reports that he has quit smoking. His smoking use included cigars. He has never used smokeless tobacco. He reports current alcohol use. He reports that he does not use drugs.    Medications:   Medications Prior to Admission   Medication Sig Dispense Refill Last Dose    acetaminophen (TYLENOL) 500 MG tablet Take 1 tablet by mouth Every 6 (Six) Hours As Needed for Mild Pain.   Past Week    CBD (cannabidiol) oral oil Take  by mouth Daily As Needed.   Past Week    diphenhydrAMINE (BENADRYL) 50 MG capsule Take 1 capsule by mouth Every 6 (Six) Hours As Needed for Itching.   11/9/2023    HYDROcodone-acetaminophen (NORCO) 7.5-325 MG per tablet Take 1 tablet by mouth Every 6 (Six) Hours As Needed for Moderate Pain.   11/10/2023    multivitamin with minerals tablet tablet Take 1 tablet by mouth Daily.   11/9/2023    mupirocin (BACTROBAN) 2 % ointment Apply 1 application  topically to the appropriate area as directed Daily As Needed.          Allergies:  Contrast dye (echo or unknown ct/mr), Erythromycin, Penicillins, and Gabapentin    ROS:    Pertinent items are noted in HPI     Objective     Blood pressure 102/83, pulse 54, resp. rate 14, height 176.5 cm (69.5\"), weight 89.4 kg (197 lb), SpO2 100%.    Physical Exam   Constitutional: Pt is oriented to person, place, and time and well-developed, well-nourished, and in no distress. "   Mouth/Throat: Oropharynx is clear and moist.   Neck: Normal range of motion.   Cardiovascular: Normal rate, regular rhythm and normal heart sounds.    Pulmonary/Chest: Effort normal and breath sounds normal.   Abdominal: Soft. Nontender  Skin: Skin is warm and dry.   Psychiatric: Mood, memory, affect and judgment normal.     Assessment & Plan     Diagnosis:  History of rectal carcinoma  Constipation  Heartburn    Anticipated Surgical Procedure:  EGD and colonoscopy    The risks, benefits, and alternatives of this procedure have been discussed with the patient or the responsible party- the patient understands and agrees to proceed.

## 2023-11-10 NOTE — ANESTHESIA POSTPROCEDURE EVALUATION
Patient: Jmail Kamara    Procedure Summary       Date: 11/10/23 Room / Location:  [facility]    Anesthesia Start: 1532 Anesthesia Stop: 1609    Procedures:       ESOPHAGOGASTRODUODENOSCOPY (Esophagus)      COLONOSCOPY into cecum Diagnosis:       Weight loss, abnormal      Generalized abdominal pain      Change in bowel habits      Heartburn      Constipation      Internal hemorrhoids      (Weight loss, abnormal [R63.4])      (Generalized abdominal pain [R10.84])      (Change in bowel habits [R19.4])      (Heartburn [R12])    Surgeons: Nino Murphy MD Provider: Pedro Navarro MD    Anesthesia Type: MAC ASA Status: 2            Anesthesia Type: MAC    Vitals  Vitals Value Taken Time   /69 11/10/23 1608   Temp     Pulse 60 11/10/23 1609   Resp 17 11/10/23 1608   SpO2 99 % 11/10/23 1608   Vitals shown include unfiled device data.        Post Anesthesia Care and Evaluation    Patient location during evaluation: bedside

## 2023-11-21 ENCOUNTER — TELEPHONE (OUTPATIENT)
Dept: ONCOLOGY | Facility: CLINIC | Age: 53
End: 2023-11-21
Payer: COMMERCIAL

## 2024-01-09 ENCOUNTER — OFFICE VISIT (OUTPATIENT)
Dept: ONCOLOGY | Facility: CLINIC | Age: 54
End: 2024-01-09
Payer: COMMERCIAL

## 2024-01-09 ENCOUNTER — LAB (OUTPATIENT)
Dept: LAB | Facility: HOSPITAL | Age: 54
End: 2024-01-09
Payer: COMMERCIAL

## 2024-01-09 VITALS
DIASTOLIC BLOOD PRESSURE: 88 MMHG | SYSTOLIC BLOOD PRESSURE: 127 MMHG | HEIGHT: 69 IN | OXYGEN SATURATION: 95 % | WEIGHT: 206.2 LBS | HEART RATE: 62 BPM | TEMPERATURE: 98.4 F | BODY MASS INDEX: 30.54 KG/M2

## 2024-01-09 DIAGNOSIS — C20 RECTAL CARCINOMA: ICD-10-CM

## 2024-01-09 DIAGNOSIS — C20 RECTAL CARCINOMA: Primary | ICD-10-CM

## 2024-01-09 LAB
ALBUMIN SERPL-MCNC: 4.1 G/DL (ref 3.5–5.2)
ALBUMIN/GLOB SERPL: 1.6 G/DL
ALP SERPL-CCNC: 80 U/L (ref 39–117)
ALT SERPL W P-5'-P-CCNC: 18 U/L (ref 1–41)
ANION GAP SERPL CALCULATED.3IONS-SCNC: 8.3 MMOL/L (ref 5–15)
AST SERPL-CCNC: 37 U/L (ref 1–40)
BASOPHILS # BLD AUTO: 0.03 10*3/MM3 (ref 0–0.2)
BASOPHILS NFR BLD AUTO: 0.4 % (ref 0–1.5)
BILIRUB SERPL-MCNC: 0.3 MG/DL (ref 0–1.2)
BUN SERPL-MCNC: 21 MG/DL (ref 6–20)
BUN/CREAT SERPL: 21.9 (ref 7–25)
CALCIUM SPEC-SCNC: 9.1 MG/DL (ref 8.6–10.5)
CEA SERPL-MCNC: 3.95 NG/ML
CHLORIDE SERPL-SCNC: 103 MMOL/L (ref 98–107)
CO2 SERPL-SCNC: 28.7 MMOL/L (ref 22–29)
CREAT SERPL-MCNC: 0.96 MG/DL (ref 0.76–1.27)
DEPRECATED RDW RBC AUTO: 39.3 FL (ref 37–54)
EGFRCR SERPLBLD CKD-EPI 2021: 93.9 ML/MIN/1.73
EOSINOPHIL # BLD AUTO: 0.3 10*3/MM3 (ref 0–0.4)
EOSINOPHIL NFR BLD AUTO: 3.9 % (ref 0.3–6.2)
ERYTHROCYTE [DISTWIDTH] IN BLOOD BY AUTOMATED COUNT: 11.6 % (ref 12.3–15.4)
GLOBULIN UR ELPH-MCNC: 2.6 GM/DL
GLUCOSE SERPL-MCNC: 102 MG/DL (ref 65–99)
HCT VFR BLD AUTO: 46.2 % (ref 37.5–51)
HGB BLD-MCNC: 15.7 G/DL (ref 13–17.7)
IMM GRANULOCYTES # BLD AUTO: 0.05 10*3/MM3 (ref 0–0.05)
IMM GRANULOCYTES NFR BLD AUTO: 0.7 % (ref 0–0.5)
LYMPHOCYTES # BLD AUTO: 1.74 10*3/MM3 (ref 0.7–3.1)
LYMPHOCYTES NFR BLD AUTO: 22.8 % (ref 19.6–45.3)
MCH RBC QN AUTO: 31.8 PG (ref 26.6–33)
MCHC RBC AUTO-ENTMCNC: 34 G/DL (ref 31.5–35.7)
MCV RBC AUTO: 93.7 FL (ref 79–97)
MONOCYTES # BLD AUTO: 1.11 10*3/MM3 (ref 0.1–0.9)
MONOCYTES NFR BLD AUTO: 14.5 % (ref 5–12)
NEUTROPHILS NFR BLD AUTO: 4.41 10*3/MM3 (ref 1.7–7)
NEUTROPHILS NFR BLD AUTO: 57.7 % (ref 42.7–76)
NRBC BLD AUTO-RTO: 0 /100 WBC (ref 0–0.2)
PLATELET # BLD AUTO: 263 10*3/MM3 (ref 140–450)
PMV BLD AUTO: 8.9 FL (ref 6–12)
POTASSIUM SERPL-SCNC: 4.4 MMOL/L (ref 3.5–5.2)
PROT SERPL-MCNC: 6.7 G/DL (ref 6–8.5)
RBC # BLD AUTO: 4.93 10*6/MM3 (ref 4.14–5.8)
SODIUM SERPL-SCNC: 140 MMOL/L (ref 136–145)
WBC NRBC COR # BLD AUTO: 7.64 10*3/MM3 (ref 3.4–10.8)

## 2024-01-09 PROCEDURE — 85025 COMPLETE CBC W/AUTO DIFF WBC: CPT

## 2024-01-09 PROCEDURE — 80053 COMPREHEN METABOLIC PANEL: CPT

## 2024-01-09 PROCEDURE — 82378 CARCINOEMBRYONIC ANTIGEN: CPT | Performed by: INTERNAL MEDICINE

## 2024-01-09 PROCEDURE — 36415 COLL VENOUS BLD VENIPUNCTURE: CPT

## 2024-01-09 NOTE — PROGRESS NOTES
Subjective     REASON FOR FOLLOW-UP:   1. 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 Fatty liver  3. 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 Genetics testing negative.    History of Present Illness   Patient is  a 53 y.o. male  with T2N0 adenocarcinoma of the upper rectum status post low anterior resection here for follow-up.  Given T2 N0 tumor, no role for chemoradiation. Followed by observation with imaging and labs.     He returns to the office today, 1/9/2024 for 3-month follow-up and review.  Since being seen last, he did undergo EGD and colonoscopy with Dr. Murphy November 2023.  Thankfully this was negative.  The patient did have an colonoscopy as he was struggling with diarrhea.  Ultimately, this was thought to be secondary to constipation.  He now continues on Linzess and reports his bowels are moving more normally.  He denies any blood in his stool.  He has no abdominal pain.  He is breathing adequately.  He has no bony pain.    Oncology history:  Patient is a 48-year-old gentleman who has had history of bright red blood per rectum since last several months.  It was going on for 4 months.  In the past he had similar symptoms and had found a stomach ulcer and was placed on Protonix at that time.  But this time he noted and went to work.  HEENT at work he started getting dizzy and then was taken to the emergency room by his wife.  He had lost 10-15 pounds in the past 4 months but he had a tendency to lose and gain.  He also felt fatigued.    In the emergency room he had a CT abdomen pelvis and that showed extensive fatty liver all of the other organs were unremarkable.  There was no evidence of any acute inflammation or abnormal enhancement.  He had history of having had a commode full of blood at work prior to his ER visit.  The emergency room discharged him with a follow-up and he was  seen by Dr. Murphy.    Patient was found to have a 7 mm polyp in the descending colon and also a 10 mm polyp in the sigmoid colon which were resected.  He had a fungating non-obstructing medium-sized mass in the proximal rectum.  The mass was circumferential partially involving one half of the lumen circumference.  It was 3 cm in length and in diameter it was 21 mm.  A biopsy was done and it was consistent with moderately differentiated adenocarcinoma of the rectum.  It was thought to be in the upper rectum or proximal rectum.  He also had normal EGD which showed normal esophagus his gastric antrum was mildly erythematous which is biopsied and he had a normal examined duodenum.    On the pathology the colon polyp in the descending colon and the sigmoid colon were all tubular adenomas but the biopsy of the rectum showed moderately differentiated adenocarcinoma.  The antral biopsy was negative and there was no Helicobacter pylori.  Patient underwent MRI which also showed the mass to be in the high rectum and patient was seen by Dr. Gardner and a low anterior resection was done.    Pathology showed invasive moderately differentiated adenocarcinoma 2.5 x 2.3 x 0.5 cm and tumor invades the muscularis propria.  It is grade 2.  There is no lymphovascular space invasion or perineural invasion identified.  Margins were all clear.  0 of 12 lymph nodes were involved.  He has a T2 N0 moderately differentiated adenocarcinoma of the upper rectum.    A portion of the tumor with adjacent normal tissue was sent for MSI and BRAF testing and it is pending.  Multiple sections of the tumor show invasive moderately differentiated adenocarcinoma invading the submucosa and into the muscularis propria.  It does not invade through the muscularis propria in the reviewed sections.  No definite lymphovascular space or perineural invasion seen.    CEA was 4.97 as of February 20, 2019 and this was prior to surgery.    He has been referred here to  "see further options of treatment.    His father has had a history of colon cancer at age 47.    MRI spine done on 04/18/2019  IMPRESSION:   No evidence for metastatic disease to the thoracic spine. The previously  noted lucent lesions within the T2 and T4 vertebrae are compatible with  Hemangiomas.    NM bone scan done on 04/01/2019:  IMPRESSION:  Scattered foci of radiotracer activity in the spine is  consistent with degenerative change. There is no scintigraphic evidence  of osseous metastasis.    CT neck, chest and abdomen pelvis done on 04/03/2019:  IMPRESSION:  1. Status post rectal resection.  2. Punctate nonobstructing stone in each kidney.  3. Fatty infiltration of the liver.  4. Two tiny smoothly marginated lucencies in the approximate T3 and T5  vertebrae. These are nonspecific and could represent small benign  lesions such as small hemangiomas. The possibility of 1 or more tiny  metastatic lesions is not excluded, but thought to be less likely. If  further evaluation is clinically indicated, MRI of the thoracic spine or  correlation with a short-term follow-up CT chest may be helpful.  5. No other definite evidence of metastatic disease.    06/12/19: Genetics testing negative.    Past Medical History:   Diagnosis Date    Abdominal pain     Allergic     Allergic rhinitis     Arthritis     Colon cancer 02/20/2019    Invasive moderately differentiated adenocarcinoma    Colon polyps     Constipation     Diverticulosis     Dry mouth     Fatty liver 02/07/2019    Gas pain     GERD (gastroesophageal reflux disease)     H/O Acute GI bleeding 2015    Insomnia     \"nerves\"    Kidney stones     in past    Migraines     Rectal bleeding     Renal disorder     Stiff neck     holding neck still to avoid dizziness    Syncope     Tattoos     Ulcer of abdomen wall 2013        Past Surgical History:   Procedure Laterality Date    APPENDECTOMY N/A     COLON RESECTION N/A 3/1/2019    Procedure: COLON RESECTION LAPAROSCOPIC " LOW ANTERIOR, flexible sigmoidoscopy;  Surgeon: Matthew Gardner MD;  Location: [facility] MAIN OR;  Service: General    COLONOSCOPY N/A 2/20/2019    Procedure: COLONOSCOPY with Hot and Cold Polypectomy and Biopsies;  Surgeon: Nino Murphy MD;  Location: [facility] ENDOSCOPY;  Service: Gastroenterology    COLONOSCOPY N/A 8/14/2020    Procedure: COLONOSCOPY to cecum and TI with hot polypectomy;  Surgeon: Nino Murphy MD;  Location: [facility] ENDOSCOPY;  Service: Gastroenterology;  Laterality: N/A;  pre - hx colon ca  post - polyp, hx resection    COLONOSCOPY N/A 11/10/2023    Procedure: COLONOSCOPY into cecum;  Surgeon: Nino Murphy MD;  Location: [facility] ENDOSCOPY;  Service: Gastroenterology;  Laterality: N/A;  pre- constipation  post- poor prep    CYSTOSCOPY W/ URETERAL STENT PLACEMENT  06/04/2016    Dr. Teddy Kaba    ENDOSCOPY N/A 2/20/2019    Procedure: ESOPHAGOGASTRODUODENOSCOPY with Biopsies;  Surgeon: Nino Murphy MD;  Location: [facility] ENDOSCOPY;  Service: Gastroenterology    ENDOSCOPY N/A 11/10/2023    Procedure: ESOPHAGOGASTRODUODENOSCOPY;  Surgeon: Nino Murphy MD;  Location: [facility] ENDOSCOPY;  Service: Gastroenterology;  Laterality: N/A;  pre- reflux  post-    ENDOSCOPY AND COLONOSCOPY N/A 04/23/2015    tubular adenoma rectal polyp    KNEE ARTHROSCOPY WITH OPEN LIGAMENT REPAIR Bilateral 1980, 1994    right knee: 1980, left knee: 1994    ORIF TIBIA/FIBULA FRACTURES Left 1980's    SHOULDER SURGERY Bilateral     Bicep repair on left, broken shoulder on right     WISDOM TOOTH EXTRACTION Bilateral         Current Outpatient Medications on File Prior to Visit   Medication Sig Dispense Refill    acetaminophen (TYLENOL) 500 MG tablet Take 1 tablet by mouth Every 6 (Six) Hours As Needed for Mild Pain.      CBD (cannabidiol) oral oil Take  by mouth Daily As Needed.      diphenhydrAMINE (BENADRYL) 50 MG capsule Take 1 capsule by mouth Every 6 (Six) Hours As Needed for Itching.       HYDROcodone-acetaminophen (NORCO) 7.5-325 MG per tablet Take 1 tablet by mouth Every 6 (Six) Hours As Needed for Moderate Pain.      linaclotide (Linzess) 145 MCG capsule capsule Take 1 capsule by mouth Every Morning Before Breakfast. 30 capsule 11    multivitamin with minerals tablet tablet Take 1 tablet by mouth Daily.      mupirocin (BACTROBAN) 2 % ointment Apply 1 application  topically to the appropriate area as directed Daily As Needed.      pantoprazole (PROTONIX) 40 MG EC tablet Take 1 tablet by mouth Daily. 90 tablet 3     No current facility-administered medications on file prior to visit.        ALLERGIES:    Allergies   Allergen Reactions    Contrast Dye (Echo Or Unknown Ct/Mr) Hives    Erythromycin Unknown - High Severity     Father was allergic-patient was told not to take due to father's reaction (heart stopped)    Penicillins Unknown - High Severity     Severe reaction as a child    Gabapentin Hives        Social History     Socioeconomic History    Marital status:      Spouse name: Corrine    Years of education: High School   Tobacco Use    Smoking status: Former     Types: Cigars    Smokeless tobacco: Never    Tobacco comments:     a few cigars a year    Vaping Use    Vaping Use: Former   Substance and Sexual Activity    Alcohol use: Yes     Comment: social, not daily    Drug use: No    Sexual activity: Yes        Family History   Problem Relation Age of Onset    Heart attack Mother     Heart disease Mother     Heart failure Mother     Colon cancer Father 47    Colon polyps Father     Heart disease Father     Diabetes Father     Heart failure Father     Malig Hyperthermia Neg Hx         Review of Systems   ROS as per HPI  I have reviewed and confirmed the accuracy of the ROS as documented by the MA/KAYLYN/RN CALLUM Wray        Objective      Vitals:    01/09/24 1543   BP: 127/88   Pulse: 62   Temp: 98.4 °F (36.9 °C)   TempSrc: Temporal   SpO2: 95%   Weight: 93.5 kg (206 lb 3.2  "oz)   Height: 176.5 cm (69.49\")   PainSc: 0-No pain           1/9/2024     3:41 PM   Current Status   ECOG score 0       Physical Exam      CONSTITUTIONAL:  Vital signs reviewed.  No distress, looks comfortable.  EYES:  Conjunctivae and lids unremarkable.  PERRLA  EARS,NOSE,MOUTH,THROAT:  Ears and nose appear unremarkable.  Lips, teeth, gums appear unremarkable.  RESPIRATORY:  Normal respiratory effort.  Lungs clear to auscultation bilaterally.  CARDIOVASCULAR:  Normal S1, S2.  No murmurs rubs or gallops.  No significant lower extremity edema.  GASTROINTESTINAL: Abdomen appears unremarkable.  Nontender.  No hepatomegaly.  No splenomegaly.  Bowel sounds present  SKIN:  Warm.  No rashes.  PSYCHIATRIC:  Normal judgment and insight.  Normal mood and affect.    I have reexamined the patient and the results are consistent with the previously documented exam. CALLUM Wray     RECENT LABS:  Results from last 7 days   Lab Units 01/09/24  1532   WBC 10*3/mm3 7.64   NEUTROS ABS 10*3/mm3 4.41   HEMOGLOBIN g/dL 15.7   HEMATOCRIT % 46.2   PLATELETS 10*3/mm3 263               CT NECK, CHEST, ABDOMEN, AND PELVIS WITHOUT IV CONTRAST. 11/23/20    IMPRESSION:  Stable examination. There is no evidence for metastatic  disease and there is no new abnormality.       EMERGENCY PA AND LATERAL CHEST X-RAY 07/07/2020     IMPRESSION:  Slightly low lung volumes with horizontal linear areas of  atelectasis or scarring at the lung bases, otherwise no active disease  is seen in the chest.       CT NECK, CHEST, ABDOMEN, AND PELVIS WITH IV CONTRAST 06/17/20  IMPRESSION:  There is no evidence for metastatic disease and there is no  new abnormality.    RECENT IMAGING:   10/30/19 - CT Chest Abdomen Pelvis  IMPRESSION:  Stable examination. There is no evidence for metastatic  disease and there is no new abnormality.    06/25/19 - Genetics Scan  Negative    Assessment & Plan    1.  T2 N0 moderately differentiated adenocarcinoma of the " upper rectum, newly diagnosed status post low anterior resection by Dr. Gardner.  Patient initially presented with bright red blood per rectum.  This was lasting 4-5 months.  He then underwent EGD colonoscopy by Dr. Murphy.  EGD was negative.  But the colonoscopy showed evidence of an lesion in the upper rectum which was 3 x 2.1 cm and it was  biopsied and consistent with moderately differentiated adenocarcinoma.  Patient was referred to Dr. Gardner and his CEA was 4.97 prior to surgery.  Patient then underwent EGD as well which was negative.  The 2 polyps in the descending colon were negative.    Patient underwent low anterior resection which on pathology shows a 2.5 cm mass extending on into the submucosa with margins clear and not involving the lymphovascular or perineural invasion.  There is 0 out of 12 lymph nodes positive.  And the tumor was situated in the upper rectum.  Reviewed CT scan which is negative except 2 lucent lesions on T3 and T5, suggested MRI of the thoracic spine  MRI done on 04/18/19 is normal without evidence of malignancy and lesion on T3 and T5 are hemangioma's.   Bone scan is negative  Followed up with observation with CT scans in 6 months.   CT scans from November 23, 2020 are negative.  Colonoscopy done August 2020 showed 9 mm polyp in the rectum resected and is consistent with adenoma  October 17, 2023: Patient is having constipation alternating with diarrhea.   10/9/2023 CT of the chest abdomen pelvis negative  11/10/2023 EGD and colonoscopy were negative  Colonoscopy noted constipation, prescribed Linzess with improvement in bowels.  Recommendations for 2-year follow-up  1/9/2024, CEA is pending.  Clinically the patient feels very well.  He will remain in observation with 6-month follow-up.    2.  Family history of colon cancer with father having colon cancer.  Patient is to follow up with  on June 6th, 2019    Plan   Colonoscopy and EGD from November 2023 reviewed  today.  Patient will remain in observation regarding his previous malignancy  CEA is pending today  6-month follow-up with Dr. Chamorro with CBC, CMP, CEA      Adelaida Cárdenas, APRN  01/09/2024       CC: Dr. Matthew Roa MD

## 2024-01-16 ENCOUNTER — HOSPITAL ENCOUNTER (EMERGENCY)
Facility: HOSPITAL | Age: 54
Discharge: HOME | End: 2024-01-16
Attending: EMERGENCY MEDICINE | Admitting: EMERGENCY MEDICINE
Payer: COMMERCIAL

## 2024-01-16 ENCOUNTER — APPOINTMENT (OUTPATIENT)
Dept: GENERAL RADIOLOGY | Facility: HOSPITAL | Age: 54
End: 2024-01-16
Payer: COMMERCIAL

## 2024-01-16 VITALS
WEIGHT: 195 LBS | BODY MASS INDEX: 27.92 KG/M2 | OXYGEN SATURATION: 98 % | HEIGHT: 70 IN | SYSTOLIC BLOOD PRESSURE: 142 MMHG | TEMPERATURE: 98.5 F | RESPIRATION RATE: 20 BRPM | DIASTOLIC BLOOD PRESSURE: 95 MMHG | HEART RATE: 95 BPM

## 2024-01-16 DIAGNOSIS — J20.5 RSV BRONCHITIS: Primary | ICD-10-CM

## 2024-01-16 LAB
B PARAPERT DNA SPEC QL NAA+PROBE: NOT DETECTED
B PERT DNA SPEC QL NAA+PROBE: NOT DETECTED
C PNEUM DNA NPH QL NAA+NON-PROBE: NOT DETECTED
FLUAV SUBTYP SPEC NAA+PROBE: NOT DETECTED
FLUBV RNA ISLT QL NAA+PROBE: NOT DETECTED
HADV DNA SPEC NAA+PROBE: NOT DETECTED
HCOV 229E RNA SPEC QL NAA+PROBE: NOT DETECTED
HCOV HKU1 RNA SPEC QL NAA+PROBE: NOT DETECTED
HCOV NL63 RNA SPEC QL NAA+PROBE: NOT DETECTED
HCOV OC43 RNA SPEC QL NAA+PROBE: NOT DETECTED
HMPV RNA NPH QL NAA+NON-PROBE: NOT DETECTED
HPIV1 RNA ISLT QL NAA+PROBE: NOT DETECTED
HPIV2 RNA SPEC QL NAA+PROBE: NOT DETECTED
HPIV3 RNA NPH QL NAA+PROBE: NOT DETECTED
HPIV4 P GENE NPH QL NAA+PROBE: NOT DETECTED
M PNEUMO IGG SER IA-ACNC: NOT DETECTED
RHINOVIRUS RNA SPEC NAA+PROBE: NOT DETECTED
RSV RNA NPH QL NAA+NON-PROBE: DETECTED
SARS-COV-2 RNA NPH QL NAA+NON-PROBE: NOT DETECTED

## 2024-01-16 PROCEDURE — 94799 UNLISTED PULMONARY SVC/PX: CPT

## 2024-01-16 PROCEDURE — 94640 AIRWAY INHALATION TREATMENT: CPT

## 2024-01-16 PROCEDURE — 94760 N-INVAS EAR/PLS OXIMETRY 1: CPT

## 2024-01-16 PROCEDURE — 94761 N-INVAS EAR/PLS OXIMETRY MLT: CPT

## 2024-01-16 PROCEDURE — 0202U NFCT DS 22 TRGT SARS-COV-2: CPT | Performed by: EMERGENCY MEDICINE

## 2024-01-16 PROCEDURE — 71046 X-RAY EXAM CHEST 2 VIEWS: CPT

## 2024-01-16 PROCEDURE — 94664 DEMO&/EVAL PT USE INHALER: CPT

## 2024-01-16 PROCEDURE — 99283 EMERGENCY DEPT VISIT LOW MDM: CPT

## 2024-01-16 RX ORDER — METHYLPREDNISOLONE 4 MG/1
TABLET ORAL
Qty: 21 TABLET | Refills: 0 | Status: SHIPPED | OUTPATIENT
Start: 2024-01-16

## 2024-01-16 RX ORDER — DEXTROMETHORPHAN HYDROBROMIDE AND PROMETHAZINE HYDROCHLORIDE 15; 6.25 MG/5ML; MG/5ML
5 SYRUP ORAL EVERY 6 HOURS PRN
Qty: 180 ML | Refills: 0 | Status: SHIPPED | OUTPATIENT
Start: 2024-01-16

## 2024-01-16 RX ORDER — IPRATROPIUM BROMIDE AND ALBUTEROL SULFATE 2.5; .5 MG/3ML; MG/3ML
3 SOLUTION RESPIRATORY (INHALATION) ONCE
Status: COMPLETED | OUTPATIENT
Start: 2024-01-16 | End: 2024-01-16

## 2024-01-16 RX ORDER — ALBUTEROL SULFATE 90 UG/1
2 AEROSOL, METERED RESPIRATORY (INHALATION) EVERY 6 HOURS PRN
Qty: 18 G | Refills: 0 | Status: SHIPPED | OUTPATIENT
Start: 2024-01-16

## 2024-01-16 RX ADMIN — IPRATROPIUM BROMIDE AND ALBUTEROL SULFATE 3 ML: 2.5; .5 SOLUTION RESPIRATORY (INHALATION) at 13:07

## 2024-01-16 NOTE — DISCHARGE INSTRUCTIONS
Rest at home avoiding any particularly strenuous activity today.     Eat small, frequent meals and drink plenty of fluids. Take any medication prescribed as instructed.     Monitor for any signs of recurrent symptoms or worsening and see your primary doctor to discuss your ER visit while returning to the ER if any concerns as we discussed.  _____________________  Why Antibiotics Were Not Prescribed for a Viral Infection:  Colds, flu, and many other upper respiratory infections such as bronchitis and rhinosinusitis are usually caused by viruses.    Antibiotics do not kill viruses. Viral infections are almost always cured by your body´s own immune system.    Based on your history and physical examination, it is likely that your illness is caused by a virus. Your illness is unlikely to be helped by an antibiotic. Antibiotics do not shorten the length of time that you will feel sick from a virus. Antibiotics do not prevent you from spreading an illness caused by a virus. Patients given antibiotics may start to feel better, but this is because the virus infection is resolving on its own and not because of the antibiotic.    In addition, taking antibiotics can come with certain risks:    -Almost one in every four persons taking antibiotics experiences side effects (for example, dizziness, stomach problems, rash, diarrhea, vaginal yeast infection).  -Rarely, people can have severe allergic reactions to antibiotics.  -Using antibiotics when they aren´t needed can lead to the antibiotics not working against other infections in the future, also known as antibiotic resistance.  -Taking antibiotics can increase the risk for Clostridium difficile infection, a form of diarrhea that requires additional treatment with antibiotics and even stool transplant in severe cases.    At this time, we recommend that you focus on treating your symptoms. Rest, stay hydrated, and take over-the-counter medications for symptom relief. You can  prevent the spread of the virus to others by washing your hands frequently, wearing a mask, coughing into your sleeve, and staying away from others while you are sick.    Seek re-evaluation by your healthcare provider if you experience any of the following:    -You don´t get better after a week  -You get better, but then get worse again  -You have a high fever (greater than 101.5 degrees Fahrenheit)

## 2024-01-16 NOTE — ED PROVIDER NOTES
EMERGENCY DEPARTMENT ENCOUNTER    Room Number:  22/22  PCP: Pedro Roa MD  Independent Historians: Patient    HPI:  Chief Complaint: had concerns including Cough and URI.      A complete HPI/ROS/PMH/PSH/SH/FH are unobtainable due to: None    Chronic or social conditions impacting patient care (Social Determinants of Health): None      Context: Jamil Kamara is a 53 y.o. male with a medical history of rectal cancer and chronic pain who presents to the ED c/o acute URI symptoms since Tuesday night.  Patient reports chills with only measured temp elevated to 99 9 at the highest.  He has had productive cough, body aches, ear pain, runny nose, sore throat.  Patient does deny any GI symptoms.  Patient denies any specific sick contacts.  Patient says he had bronchitis in the past and took a few leftover antibiotics from a dog bite in July with no improvement in his symptoms so decided to come in for evaluation.  Patient denies any chest pain, syncope, palpitations.        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  Patient underwent low anterior resection of the rectal mass.  Patient had repeat scopes November 2023 that were negative.  Patient to follow-up in 6 months  Prescription drug monitoring program review: SHANELL reviewed by Moriah Rose MD KASPER query complete and reviewed. Patient receives regular prescriptions for controlled substances.    PAST MEDICAL HISTORY  Active Ambulatory Problems     Diagnosis Date Noted    Arthritis     Allergic     Hematochezia 02/12/2019    Early satiety 02/12/2019    Dyspepsia and disorder of function of stomach 02/12/2019    Rectal cancer 02/27/2019    Family history of colon cancer in father 12/16/2019    Rectal carcinoma 07/28/2020    Weight loss, abnormal 09/27/2023     Generalized abdominal pain 09/27/2023    Change in bowel habits 09/27/2023    Heartburn 09/27/2023     Resolved Ambulatory Problems     Diagnosis Date Noted    No Resolved Ambulatory Problems     Past Medical History:   Diagnosis Date    Abdominal pain     Allergic rhinitis     Colon cancer 02/20/2019    Colon polyps     Constipation     Diverticulosis     Dry mouth     Fatty liver 02/07/2019    Gas pain     GERD (gastroesophageal reflux disease)     H/O Acute GI bleeding 2015    Insomnia     Kidney stones     Migraines     Rectal bleeding     Renal disorder     Stiff neck     Syncope     Tattoos     Ulcer of abdomen wall 2013         PAST SURGICAL HISTORY  Past Surgical History:   Procedure Laterality Date    APPENDECTOMY N/A     COLON RESECTION N/A 3/1/2019    Procedure: COLON RESECTION LAPAROSCOPIC LOW ANTERIOR, flexible sigmoidoscopy;  Surgeon: Matthew Gardner MD;  Location: [facility] MAIN OR;  Service: General    COLONOSCOPY N/A 2/20/2019    Procedure: COLONOSCOPY with Hot and Cold Polypectomy and Biopsies;  Surgeon: Nino Murphy MD;  Location: [facility] ENDOSCOPY;  Service: Gastroenterology    COLONOSCOPY N/A 8/14/2020    Procedure: COLONOSCOPY to cecum and TI with hot polypectomy;  Surgeon: Nino Murphy MD;  Location: [facility] ENDOSCOPY;  Service: Gastroenterology;  Laterality: N/A;  pre - hx colon ca  post - polyp, hx resection    COLONOSCOPY N/A 11/10/2023    Procedure: COLONOSCOPY into cecum;  Surgeon: Nino Murphy MD;  Location: [facility] ENDOSCOPY;  Service: Gastroenterology;  Laterality: N/A;  pre- constipation  post- poor prep    CYSTOSCOPY W/ URETERAL STENT PLACEMENT  06/04/2016    Dr. Teddy Kaba    ENDOSCOPY N/A 2/20/2019    Procedure: ESOPHAGOGASTRODUODENOSCOPY with Biopsies;  Surgeon: Nino Murphy MD;  Location: [facility] ENDOSCOPY;  Service: Gastroenterology    ENDOSCOPY N/A 11/10/2023    Procedure: ESOPHAGOGASTRODUODENOSCOPY;  Surgeon: Nino Murphy MD;  Location:  [facility] ENDOSCOPY;  Service: Gastroenterology;  Laterality: N/A;  pre- reflux  post-    ENDOSCOPY AND COLONOSCOPY N/A 04/23/2015    tubular adenoma rectal polyp    KNEE ARTHROSCOPY WITH OPEN LIGAMENT REPAIR Bilateral 1980, 1994    right knee: 1980, left knee: 1994    ORIF TIBIA/FIBULA FRACTURES Left 1980's    SHOULDER SURGERY Bilateral     Bicep repair on left, broken shoulder on right     WISDOM TOOTH EXTRACTION Bilateral          FAMILY HISTORY  Family History   Problem Relation Age of Onset    Heart attack Mother     Heart disease Mother     Heart failure Mother     Colon cancer Father 47    Colon polyps Father     Heart disease Father     Diabetes Father     Heart failure Father     Malig Hyperthermia Neg Hx          SOCIAL HISTORY  Social History     Socioeconomic History    Marital status:      Spouse name: Corrine    Years of education: High School   Tobacco Use    Smoking status: Former     Types: Cigars    Smokeless tobacco: Never    Tobacco comments:     a few cigars a year    Vaping Use    Vaping Use: Former   Substance and Sexual Activity    Alcohol use: Yes     Comment: social, not daily    Drug use: No    Sexual activity: Yes         ALLERGIES  Contrast dye (echo or unknown ct/mr), Erythromycin, Penicillins, and Gabapentin        REVIEW OF SYSTEMS  Review of Systems  Included in HPI  All systems reviewed and negative except for those discussed in HPI.      PHYSICAL EXAM    I have reviewed the triage vital signs and nursing notes.    ED Triage Vitals   Temp Heart Rate Resp BP SpO2   01/16/24 1140 01/16/24 1140 01/16/24 1140 01/16/24 1150 01/16/24 1140   98.5 °F (36.9 °C) 95 18 142/95 98 %      Temp src Heart Rate Source Patient Position BP Location FiO2 (%)   01/16/24 1140 01/16/24 1140 01/16/24 1150 01/16/24 1150 --   Tympanic Monitor Lying Right arm        Physical Exam  GENERAL: Pleasant cooperative and conversant  male, alert, no acute distress  SKIN: Warm, dry  HENT: Normocephalic,  atraumatic, mucous membranes moist, posterior oropharynx with mild erythema  EYES: no scleral icterus, EOMI, no conjunctivitis  CV: regular rhythm, regular rate, no murmur  RESPIRATORY: normal effort, diminished at the bases bilaterally on auscultation with faint expiratory wheeze scattered  ABDOMEN: soft, nontender, nondistended  MUSCULOSKELETAL: no deformity, no lower extremity edema  NEURO: alert, moves all extremities, follows commands                                                                   LAB RESULTS  Recent Results (from the past 24 hour(s))   Respiratory Panel PCR w/COVID-19(SARS-CoV-2) CIPRIANO/CONNER/JB/PAD/COR/SUJIT In-House, NP Swab in UTM/VTM, 2 HR TAT - Swab, Nasopharynx    Collection Time: 01/16/24 12:26 PM    Specimen: Nasopharynx; Swab   Result Value Ref Range    ADENOVIRUS, PCR Not Detected Not Detected    Coronavirus 229E Not Detected Not Detected    Coronavirus HKU1 Not Detected Not Detected    Coronavirus NL63 Not Detected Not Detected    Coronavirus OC43 Not Detected Not Detected    COVID19 Not Detected Not Detected - Ref. Range    Human Metapneumovirus Not Detected Not Detected    Human Rhinovirus/Enterovirus Not Detected Not Detected    Influenza A PCR Not Detected Not Detected    Influenza B PCR Not Detected Not Detected    Parainfluenza Virus 1 Not Detected Not Detected    Parainfluenza Virus 2 Not Detected Not Detected    Parainfluenza Virus 3 Not Detected Not Detected    Parainfluenza Virus 4 Not Detected Not Detected    RSV, PCR Detected (A) Not Detected    Bordetella pertussis pcr Not Detected Not Detected    Bordetella parapertussis PCR Not Detected Not Detected    Chlamydophila pneumoniae PCR Not Detected Not Detected    Mycoplasma pneumo by PCR Not Detected Not Detected         RADIOLOGY  XR Chest 2 View    Result Date: 1/16/2024  XR CHEST 2 VW-1/16/2024  HISTORY: Cough.  Heart size is within normal limits. Lungs appear free of acute infiltrates. There are minor degenerative changes  in the spine. There is some air beneath the right hemidiaphragm probably interposed colon between the diaphragm and the liver.      1. No acute process.   This report was finalized on 1/16/2024 12:43 PM by Dr. Iraj Garcia M.D on Workstation: [id number]         MEDICATIONS GIVEN IN ER  Medications   ipratropium-albuterol (DUO-NEB) nebulizer solution 3 mL (3 mL Nebulization Given 1/16/24 1307)         ORDERS PLACED DURING THIS VISIT:  Orders Placed This Encounter   Procedures    Respiratory Panel PCR w/COVID-19(SARS-CoV-2) CIP[facility]O/CONNER/JB/PAD/COR/SUJIT In-House, NP Swab in UTM/VTM, 2 HR TAT - Swab, Nasopharynx    XR Chest 2 View         OUTPATIENT MEDICATION MANAGEMENT:  No current Epic-ordered facility-administered medications on file.     Current Outpatient Medications Ordered in Epic   Medication Sig Dispense Refill    acetaminophen (TYLENOL) 500 MG tablet Take 1 tablet by mouth Every 6 (Six) Hours As Needed for Mild Pain.      albuterol sulfate  (90 Base) MCG/ACT inhaler Inhale 2 puffs Every 6 (Six) Hours As Needed for Wheezing or Shortness of Air. 18 g 0    CBD (cannabidiol) oral oil Take  by mouth Daily As Needed.      diphenhydrAMINE (BENADRYL) 50 MG capsule Take 1 capsule by mouth Every 6 (Six) Hours As Needed for Itching.      HYDROcodone-acetaminophen (NORCO) 7.5-325 MG per tablet Take 1 tablet by mouth Every 6 (Six) Hours As Needed for Moderate Pain.      linaclotide (Linzess) 145 MCG capsule capsule Take 1 capsule by mouth Every Morning Before Breakfast. 30 capsule 11    methylPREDNISolone (MEDROL) 4 MG dose pack Take as directed on package instructions. 21 tablet 0    multivitamin with minerals tablet tablet Take 1 tablet by mouth Daily.      mupirocin (BACTROBAN) 2 % ointment Apply 1 application  topically to the appropriate area as directed Daily As Needed.      pantoprazole (PROTONIX) 40 MG EC tablet Take 1 tablet by mouth Daily. 90 tablet 3    promethazine-dextromethorphan (PROMETHAZINE-DM)  6.25-15 MG/5ML syrup Take 5 mL by mouth Every 6 (Six) Hours As Needed for Cough. 180 mL 0         PROCEDURES  Procedures            PROGRESS, DATA ANALYSIS, CONSULTS, AND MEDICAL DECISION MAKING  All labs have been independently interpreted by me.  All radiology studies have been reviewed by me. All EKG's have been independently viewed and interpreted by me.  Discussion below represents my analysis of pertinent findings related to patient's condition, differential diagnosis, treatment plan and final disposition.    Differential diagnosis includes but is not limited to   This patient presents with a constellation of symptoms likely representing viral upper respiratory infection or bronchitis as characterized by: Productive cough, chills, body aches. No chest pain nor shortness of breath. The patient´s presentation is not consistent with other acute cardiopulmonary emergencies like ACS, CHF, or pericardial effusion. Possible COVID-19 (coronavirus), flu, or RSV or any number of other viral illnesses currently at a community peak, but no specific sick contacts, no recent travel or participation in large gatherings, no respiratory compromise, nontoxic appearance.   Other diagnoses were considered in the differential diagnosis for this patient. Considered:  CNS infection, bacterial sinusitis, and pneumonia but low likelihood given documented exam and history. Strep or Mononucleosis also thought less likely as no pharyngeal exudates, no posterior lymphadenopathy, and no splenomegaly on exam.  .    Clinical Scores:                  ED Course as of 01/16/24 2054 Tue Jan 16, 2024   1251 I viewed patient's chest x-ray and on my interpretation patient has normal heart size and no pulmonary consolidation or effusions [AR]      ED Course User Index  [AR] Moriah Rose MD     I discussed all results with patient and answered all questions to the best of my ability. The patient was encouraged to follow up appropriately.       Routine discharge counseling was given, and the patient was instructed to promptly call or followup with their primary physician or even return to the ER if any worsening, changing or persistent symptoms.                AS OF 20:54 EST VITALS:    BP - 142/95  HR - 95  TEMP - 98.5 °F (36.9 °C) (Tympanic)  O2 SATS - 98%      COMPLEXITY OF CARE  Admission was considered but after careful review of the patient's presentation, physical examination, diagnostic results, and response to treatment the patient may be safely discharged with outpatient follow-up.    DIAGNOSIS  Final diagnoses:   RSV bronchitis         DISPOSITION  ED Disposition       ED Disposition   Discharge    Condition   Stable    Comment   --                Please note that portions of this document were completed with a voice recognition program.    Note Disclaimer: At [facility], we believe that sharing information builds trust and better relationships. You are receiving this note because you recently visited [facility]. It is possible you will see health information before a provider has talked with you about it. This kind of information can be easy to misunderstand. To help you fully understand what it means for your health, we urge you to discuss this note with your provider.         Moriah Rose MD  01/16/24 4106

## 2024-01-16 NOTE — Clinical Note
[facility] EMERGENCY DEPARTMENT  [address] [facility] [address]  Phone: [phone]    Jamil Kamara was seen and treated in our emergency department on 1/16/2024.  He may return to work on 01/18/2024.         Thank you for choosing [facility].    Moriah Rose MD

## 2024-03-04 NOTE — PROGRESS NOTES
"Chief Complaint  Establish Care (Pt needs his albuterol inhaler refilled) and Back Pain    Subjective          Jamil Kamara presents to [facility]  History of Present Illness  He is here to establish care.    Rectal cancer: He has a history of rectal cancer in 2019.  He follows with oncology.  His last colonoscopy was 11/2023. He had rectal surgery to remove his rectal cancer. He hasn't undergone chemo or radiation. He saw his oncologist in January and has an appt in July.     Low back pain: He is taking Norco. He is going through pain management, Dr. Acuna. He does get back procedures and epidurals.     He had RSV in January. Every since then, he has had some chest tightness and SOA, which is relieved with albuterol.    Vertigo: He will get vertigo from time-to-time. He has been to PT and has been taught exercises to help with the vertigo. He reports that he was told he had BPPV.      Objective   Vital Signs:   /85 (BP Location: Right arm, Patient Position: Sitting)   Pulse 64   Ht 177.8 cm (70\")   Wt 94.5 kg (208 lb 6.4 oz)   SpO2 95% Comment: room air  BMI 29.90 kg/m²     Physical Exam  Constitutional:       General: He is not in acute distress.     Appearance: Normal appearance.   HENT:      Head: Normocephalic.   Eyes:      Pupils: Pupils are equal, round, and reactive to light.      Visual Fields: Right eye visual fields normal and left eye visual fields normal.   Neck:      Trachea: Trachea normal.   Cardiovascular:      Rate and Rhythm: Normal rate and regular rhythm.      Heart sounds: Normal heart sounds.   Pulmonary:      Effort: Pulmonary effort is normal.      Breath sounds: Normal breath sounds and air entry.   Musculoskeletal:      Right lower leg: No edema.      Left lower leg: No edema.   Skin:     General: Skin is warm and dry.   Neurological:      Mental Status: He is alert and oriented to person, place, and time.   Psychiatric:         Mood and " Affect: Mood and affect normal.         Behavior: Behavior normal.         Thought Content: Thought content normal.        Result Review :   The following data was reviewed by: CALLUM Medina on 03/11/2024:  CEA (01/09/2024 15:32)  Comprehensive Metabolic Panel (01/09/2024 15:32)  CBC & Differential (01/09/2024 15:32)  XR Chest 2 View (01/16/2024 12:40)   CT Abdomen Pelvis Without Contrast (10/09/2023 12:36)              Assessment and Plan    Diagnoses and all orders for this visit:    1. Encounter to establish care (Primary)    2. Rectal cancer  Assessment & Plan:  Continue follow-up with oncology.  He underwent colon resection several years back.  He has not had any chemo or radiation.  His last colonoscopy was November 2023.      3. Chronic bilateral low back pain without sciatica  Assessment & Plan:  He currently gets procedures through pain management and takes Norco.      4. SOB (shortness of breath)  -     albuterol sulfate  (90 Base) MCG/ACT inhaler; Inhale 2 puffs Every 6 (Six) Hours As Needed for Wheezing or Shortness of Air.  Dispense: 18 g; Refill: 1    5. Vertigo  Comments:  Will give a trial of meclizine and see if that will help when he gets episodes of vertigo.  Orders:  -     meclizine (ANTIVERT) 12.5 MG tablet; Take 1 tablet by mouth 3 (Three) Times a Day As Needed for Dizziness.  Dispense: 30 tablet; Refill: 1          Follow Up   Return in about 6 months (around 9/11/2024) for Annual physical.  Patient was given instructions and counseling regarding his condition or for health maintenance advice. Please see specific information pulled into the AVS if appropriate.

## 2024-03-11 ENCOUNTER — OFFICE VISIT (OUTPATIENT)
Dept: FAMILY MEDICINE CLINIC | Age: 54
End: 2024-03-11
Payer: COMMERCIAL

## 2024-03-11 VITALS
BODY MASS INDEX: 29.84 KG/M2 | WEIGHT: 208.4 LBS | OXYGEN SATURATION: 95 % | SYSTOLIC BLOOD PRESSURE: 132 MMHG | HEIGHT: 70 IN | DIASTOLIC BLOOD PRESSURE: 85 MMHG | HEART RATE: 64 BPM

## 2024-03-11 DIAGNOSIS — G89.29 CHRONIC BILATERAL LOW BACK PAIN WITHOUT SCIATICA: ICD-10-CM

## 2024-03-11 DIAGNOSIS — C20 RECTAL CANCER: ICD-10-CM

## 2024-03-11 DIAGNOSIS — R06.02 SOB (SHORTNESS OF BREATH): ICD-10-CM

## 2024-03-11 DIAGNOSIS — R42 VERTIGO: ICD-10-CM

## 2024-03-11 DIAGNOSIS — M54.50 CHRONIC BILATERAL LOW BACK PAIN WITHOUT SCIATICA: ICD-10-CM

## 2024-03-11 DIAGNOSIS — Z76.89 ENCOUNTER TO ESTABLISH CARE: Primary | ICD-10-CM

## 2024-03-11 RX ORDER — MECLIZINE HCL 12.5 MG/1
12.5 TABLET ORAL 3 TIMES DAILY PRN
Qty: 30 TABLET | Refills: 1 | Status: SHIPPED | OUTPATIENT
Start: 2024-03-11

## 2024-03-11 RX ORDER — ALBUTEROL SULFATE 90 UG/1
2 AEROSOL, METERED RESPIRATORY (INHALATION) EVERY 6 HOURS PRN
Qty: 18 G | Refills: 1 | Status: SHIPPED | OUTPATIENT
Start: 2024-03-11

## 2024-03-11 RX ORDER — NALOXONE HYDROCHLORIDE 4 MG/.1ML
SPRAY NASAL
COMMUNITY
Start: 2024-03-05

## 2024-03-11 NOTE — ASSESSMENT & PLAN NOTE
Continue follow-up with oncology.  He underwent colon resection several years back.  He has not had any chemo or radiation.  His last colonoscopy was November 2023.

## 2024-04-19 ENCOUNTER — OFFICE VISIT (OUTPATIENT)
Dept: FAMILY MEDICINE CLINIC | Age: 54
End: 2024-04-19
Payer: COMMERCIAL

## 2024-04-19 VITALS
TEMPERATURE: 97.6 F | OXYGEN SATURATION: 94 % | HEIGHT: 70 IN | WEIGHT: 208 LBS | SYSTOLIC BLOOD PRESSURE: 138 MMHG | DIASTOLIC BLOOD PRESSURE: 83 MMHG | HEART RATE: 68 BPM | BODY MASS INDEX: 29.78 KG/M2

## 2024-04-19 DIAGNOSIS — T63.301A SPIDER BITE WOUND, ACCIDENTAL OR UNINTENTIONAL, INITIAL ENCOUNTER: Primary | ICD-10-CM

## 2024-04-19 PROCEDURE — 87186 SC STD MICRODIL/AGAR DIL: CPT | Performed by: NURSE PRACTITIONER

## 2024-04-19 PROCEDURE — 87070 CULTURE OTHR SPECIMN AEROBIC: CPT | Performed by: NURSE PRACTITIONER

## 2024-04-19 PROCEDURE — 87205 SMEAR GRAM STAIN: CPT | Performed by: NURSE PRACTITIONER

## 2024-04-19 PROCEDURE — 87147 CULTURE TYPE IMMUNOLOGIC: CPT | Performed by: NURSE PRACTITIONER

## 2024-04-19 PROCEDURE — 99213 OFFICE O/P EST LOW 20 MIN: CPT | Performed by: NURSE PRACTITIONER

## 2024-04-19 RX ORDER — DOXYCYCLINE HYCLATE 100 MG/1
100 CAPSULE ORAL 2 TIMES DAILY
Qty: 14 CAPSULE | Refills: 0 | Status: SHIPPED | OUTPATIENT
Start: 2024-04-19 | End: 2024-04-26

## 2024-04-19 NOTE — PROGRESS NOTES
"Chief Complaint  Insect Bite (Patient c/o spider bite on left cheek pt states the thinks it was on 4/13 )    Subjective        Jamil Kamara presents to [facility]  Insect Bite  This is a new problem. The current episode started in the past 7 days. The problem has been gradually worsening. Associated symptoms include fatigue. Pertinent negatives include no chills or fever. He has tried ice for the symptoms. The treatment provided no relief.       Objective   Vital Signs:  /83 (BP Location: Left arm, Patient Position: Sitting, Cuff Size: Adult)   Pulse 68   Temp 97.6 °F (36.4 °C) (Oral)   Ht 177.8 cm (70\")   Wt 94.3 kg (208 lb)   SpO2 94%   BMI 29.84 kg/m²   Estimated body mass index is 29.84 kg/m² as calculated from the following:    Height as of this encounter: 177.8 cm (70\").    Weight as of this encounter: 94.3 kg (208 lb).             Physical Exam  HENT:      Head:     Cardiovascular:      Rate and Rhythm: Normal rate and regular rhythm.   Pulmonary:      Effort: Pulmonary effort is normal.      Breath sounds: Normal breath sounds.   Skin:     General: Skin is warm and dry.   Neurological:      Mental Status: He is alert and oriented to person, place, and time.        Result Review :                     Assessment and Plan     Diagnoses and all orders for this visit:    1. Spider bite wound, accidental or unintentional, initial encounter (Primary)  Comments:  handout provided on spider bite, F/U for worsening  Orders:  -     doxycycline (VIBRAMYCIN) 100 MG capsule; Take 1 capsule by mouth 2 (Two) Times a Day for 7 days.  Dispense: 14 capsule; Refill: 0  -     mupirocin (BACTROBAN) 2 % ointment; Apply 1 Application topically to the appropriate area as directed 3 (Three) Times a Day.  Dispense: 30 g; Refill: 1  -     Wound Culture - Wound, Cheek, Right; Future  -     Wound Culture - Wound, Cheek, Right             Follow Up     Return if symptoms worsen or fail " to improve.  Patient was given instructions and counseling regarding his condition or for health maintenance advice. Please see specific information pulled into the AVS if appropriate.

## 2024-04-22 LAB
BACTERIA SPEC AEROBE CULT: ABNORMAL
GRAM STN SPEC: ABNORMAL
GRAM STN SPEC: ABNORMAL

## 2024-07-17 ENCOUNTER — TELEPHONE (OUTPATIENT)
Dept: ONCOLOGY | Facility: CLINIC | Age: 54
End: 2024-07-17
Payer: COMMERCIAL

## 2024-08-13 DIAGNOSIS — R06.02 SOB (SHORTNESS OF BREATH): ICD-10-CM

## 2024-08-13 RX ORDER — ALBUTEROL SULFATE 90 UG/1
2 AEROSOL, METERED RESPIRATORY (INHALATION) EVERY 6 HOURS PRN
Qty: 18 G | Refills: 1 | Status: SHIPPED | OUTPATIENT
Start: 2024-08-13

## 2024-08-20 ENCOUNTER — LAB (OUTPATIENT)
Dept: LAB | Facility: HOSPITAL | Age: 54
End: 2024-08-20
Payer: COMMERCIAL

## 2024-08-20 ENCOUNTER — OFFICE VISIT (OUTPATIENT)
Dept: ONCOLOGY | Facility: CLINIC | Age: 54
End: 2024-08-20
Payer: COMMERCIAL

## 2024-08-20 VITALS
SYSTOLIC BLOOD PRESSURE: 116 MMHG | HEART RATE: 64 BPM | BODY MASS INDEX: 31.64 KG/M2 | DIASTOLIC BLOOD PRESSURE: 74 MMHG | OXYGEN SATURATION: 95 % | TEMPERATURE: 98.4 F | WEIGHT: 221 LBS | HEIGHT: 70 IN

## 2024-08-20 DIAGNOSIS — C20 RECTAL CARCINOMA: Primary | ICD-10-CM

## 2024-08-20 DIAGNOSIS — C20 RECTAL CARCINOMA: ICD-10-CM

## 2024-08-20 LAB
ALBUMIN SERPL-MCNC: 3.8 G/DL (ref 3.5–5.2)
ALBUMIN/GLOB SERPL: 1.5 G/DL
ALP SERPL-CCNC: 80 U/L (ref 39–117)
ALT SERPL W P-5'-P-CCNC: 15 U/L (ref 1–41)
ANION GAP SERPL CALCULATED.3IONS-SCNC: 8.4 MMOL/L (ref 5–15)
AST SERPL-CCNC: 28 U/L (ref 1–40)
BASOPHILS # BLD AUTO: 0.06 10*3/MM3 (ref 0–0.2)
BASOPHILS NFR BLD AUTO: 0.9 % (ref 0–1.5)
BILIRUB SERPL-MCNC: 0.3 MG/DL (ref 0–1.2)
BUN SERPL-MCNC: 18 MG/DL (ref 6–20)
BUN/CREAT SERPL: 17 (ref 7–25)
CALCIUM SPEC-SCNC: 8.9 MG/DL (ref 8.6–10.5)
CEA SERPL-MCNC: 4.62 NG/ML
CHLORIDE SERPL-SCNC: 103 MMOL/L (ref 98–107)
CO2 SERPL-SCNC: 28.6 MMOL/L (ref 22–29)
CREAT SERPL-MCNC: 1.06 MG/DL (ref 0.76–1.27)
DEPRECATED RDW RBC AUTO: 39.1 FL (ref 37–54)
EGFRCR SERPLBLD CKD-EPI 2021: 82.9 ML/MIN/1.73
EOSINOPHIL # BLD AUTO: 0.33 10*3/MM3 (ref 0–0.4)
EOSINOPHIL NFR BLD AUTO: 5.1 % (ref 0.3–6.2)
ERYTHROCYTE [DISTWIDTH] IN BLOOD BY AUTOMATED COUNT: 11.5 % (ref 12.3–15.4)
GLOBULIN UR ELPH-MCNC: 2.5 GM/DL
GLUCOSE SERPL-MCNC: 102 MG/DL (ref 65–99)
HCT VFR BLD AUTO: 45.8 % (ref 37.5–51)
HGB BLD-MCNC: 15.7 G/DL (ref 13–17.7)
IMM GRANULOCYTES # BLD AUTO: 0.02 10*3/MM3 (ref 0–0.05)
IMM GRANULOCYTES NFR BLD AUTO: 0.3 % (ref 0–0.5)
LYMPHOCYTES # BLD AUTO: 2.25 10*3/MM3 (ref 0.7–3.1)
LYMPHOCYTES NFR BLD AUTO: 34.7 % (ref 19.6–45.3)
MCH RBC QN AUTO: 31.7 PG (ref 26.6–33)
MCHC RBC AUTO-ENTMCNC: 34.3 G/DL (ref 31.5–35.7)
MCV RBC AUTO: 92.5 FL (ref 79–97)
MONOCYTES # BLD AUTO: 0.97 10*3/MM3 (ref 0.1–0.9)
MONOCYTES NFR BLD AUTO: 15 % (ref 5–12)
NEUTROPHILS NFR BLD AUTO: 2.85 10*3/MM3 (ref 1.7–7)
NEUTROPHILS NFR BLD AUTO: 44 % (ref 42.7–76)
NRBC BLD AUTO-RTO: 0 /100 WBC (ref 0–0.2)
PLATELET # BLD AUTO: 257 10*3/MM3 (ref 140–450)
PMV BLD AUTO: 8.9 FL (ref 6–12)
POTASSIUM SERPL-SCNC: 4.3 MMOL/L (ref 3.5–5.2)
PROT SERPL-MCNC: 6.3 G/DL (ref 6–8.5)
RBC # BLD AUTO: 4.95 10*6/MM3 (ref 4.14–5.8)
SODIUM SERPL-SCNC: 140 MMOL/L (ref 136–145)
WBC NRBC COR # BLD AUTO: 6.48 10*3/MM3 (ref 3.4–10.8)

## 2024-08-20 PROCEDURE — 99213 OFFICE O/P EST LOW 20 MIN: CPT | Performed by: INTERNAL MEDICINE

## 2024-08-20 PROCEDURE — 80053 COMPREHEN METABOLIC PANEL: CPT

## 2024-08-20 PROCEDURE — 36415 COLL VENOUS BLD VENIPUNCTURE: CPT

## 2024-08-20 PROCEDURE — 82378 CARCINOEMBRYONIC ANTIGEN: CPT | Performed by: INTERNAL MEDICINE

## 2024-08-20 PROCEDURE — 85025 COMPLETE CBC W/AUTO DIFF WBC: CPT

## 2024-08-20 NOTE — PROGRESS NOTES
Subjective     REASON FOR FOLLOW-UP:   1. 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 Fatty liver  3. 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 Genetics testing negative.    History of Present Illness   Patient is  a 54 y.o. male  with T2N0 adenocarcinoma of the upper rectum status post low anterior resection here for follow-up.  Given T2 N0 tumor, no role for chemoradiation. Followed by observation with imaging and labs.     He returns to the office today, 1/9/2024 for 3-month follow-up and review.  Since being seen last, he did undergo EGD and colonoscopy with Dr. Murphy November 2023.  Thankfully this was negative.  The patient did have an colonoscopy as he was struggling with diarrhea.  Ultimately, this was thought to be secondary to constipation.  He now continues on Linzess and reports his bowels are moving more normally.  He denies any blood in his stool.  He has no abdominal pain.  He is breathing adequately.  He has no bony pain.    August 20, 2024: Patient has gained weight.  He has reasonable appetite.  He takes pain medications hydrocodone for his chronic back pain.  He does have constipation.  He says he has intermittent left lower quadrant discomfort likely secondary to constipation.  He does not have any blood in the stools.  He had a colonoscopy and the EGD back in November 10, 2024 and both of them were negative.  His CEA has always been low.  His CT scan October 9, 2023 has been negative..    Oncology history:  Patient is a 48-year-old gentleman who has had history of bright red blood per rectum since last several months.  It was going on for 4 months.  In the past he had similar symptoms and had found a stomach ulcer and was placed on Protonix at that time.  But this time he noted and went to work.  HEENT at work he started getting dizzy and then was taken to the emergency room by  his wife.  He had lost 10-15 pounds in the past 4 months but he had a tendency to lose and gain.  He also felt fatigued.    In the emergency room he had a CT abdomen pelvis and that showed extensive fatty liver all of the other organs were unremarkable.  There was no evidence of any acute inflammation or abnormal enhancement.  He had history of having had a commode full of blood at work prior to his ER visit.  The emergency room discharged him with a follow-up and he was seen by Dr. Murphy.    Patient was found to have a 7 mm polyp in the descending colon and also a 10 mm polyp in the sigmoid colon which were resected.  He had a fungating non-obstructing medium-sized mass in the proximal rectum.  The mass was circumferential partially involving one half of the lumen circumference.  It was 3 cm in length and in diameter it was 21 mm.  A biopsy was done and it was consistent with moderately differentiated adenocarcinoma of the rectum.  It was thought to be in the upper rectum or proximal rectum.  He also had normal EGD which showed normal esophagus his gastric antrum was mildly erythematous which is biopsied and he had a normal examined duodenum.    On the pathology the colon polyp in the descending colon and the sigmoid colon were all tubular adenomas but the biopsy of the rectum showed moderately differentiated adenocarcinoma.  The antral biopsy was negative and there was no Helicobacter pylori.  Patient underwent MRI which also showed the mass to be in the high rectum and patient was seen by Dr. Gardner and a low anterior resection was done.    Pathology showed invasive moderately differentiated adenocarcinoma 2.5 x 2.3 x 0.5 cm and tumor invades the muscularis propria.  It is grade 2.  There is no lymphovascular space invasion or perineural invasion identified.  Margins were all clear.  0 of 12 lymph nodes were involved.  He has a T2 N0 moderately differentiated adenocarcinoma of the upper rectum.    A portion of  the tumor with adjacent normal tissue was sent for MSI and BRAF testing and it is pending.  Multiple sections of the tumor show invasive moderately differentiated adenocarcinoma invading the submucosa and into the muscularis propria.  It does not invade through the muscularis propria in the reviewed sections.  No definite lymphovascular space or perineural invasion seen.    CEA was 4.97 as of February 20, 2019 and this was prior to surgery.    He has been referred here to see further options of treatment.    His father has had a history of colon cancer at age 47.    MRI spine done on 04/18/2019  IMPRESSION:   No evidence for metastatic disease to the thoracic spine. The previously  noted lucent lesions within the T2 and T4 vertebrae are compatible with  Hemangiomas.    NM bone scan done on 04/01/2019:  IMPRESSION:  Scattered foci of radiotracer activity in the spine is  consistent with degenerative change. There is no scintigraphic evidence  of osseous metastasis.    CT neck, chest and abdomen pelvis done on 04/03/2019:  IMPRESSION:  1. Status post rectal resection.  2. Punctate nonobstructing stone in each kidney.  3. Fatty infiltration of the liver.  4. Two tiny smoothly marginated lucencies in the approximate T3 and T5  vertebrae. These are nonspecific and could represent small benign  lesions such as small hemangiomas. The possibility of 1 or more tiny  metastatic lesions is not excluded, but thought to be less likely. If  further evaluation is clinically indicated, MRI of the thoracic spine or  correlation with a short-term follow-up CT chest may be helpful.  5. No other definite evidence of metastatic disease.    06/12/19: Genetics testing negative.    Past Medical History:   Diagnosis Date    Abdominal pain     Allergic     Allergic rhinitis     Arthritis     Colon cancer 02/20/2019    Invasive moderately differentiated adenocarcinoma    Colon polyps     Constipation     Diverticulosis     Dry mouth     Fatty  "liver 02/07/2019    Gas pain     GERD (gastroesophageal reflux disease)     H/O Acute GI bleeding 2015    Insomnia     \"nerves\"    Kidney stones     in past    Migraines     Rectal bleeding     Renal disorder     Stiff neck     holding neck still to avoid dizziness    Syncope     Tattoos     Ulcer of abdomen wall 2013        Past Surgical History:   Procedure Laterality Date    APPENDECTOMY N/A     COLON RESECTION N/A 3/1/2019    Procedure: COLON RESECTION LAPAROSCOPIC LOW ANTERIOR, flexible sigmoidoscopy;  Surgeon: Matthew Gardner MD;  Location: [facility] MAIN OR;  Service: General    COLONOSCOPY N/A 2/20/2019    Procedure: COLONOSCOPY with Hot and Cold Polypectomy and Biopsies;  Surgeon: Nino Murphy MD;  Location: [facility] ENDOSCOPY;  Service: Gastroenterology    COLONOSCOPY N/A 8/14/2020    Procedure: COLONOSCOPY to cecum and TI with hot polypectomy;  Surgeon: Nino Murphy MD;  Location: [facility] ENDOSCOPY;  Service: Gastroenterology;  Laterality: N/A;  pre - hx colon ca  post - polyp, hx resection    COLONOSCOPY N/A 11/10/2023    Procedure: COLONOSCOPY into cecum;  Surgeon: Nino Murphy MD;  Location: [facility] ENDOSCOPY;  Service: Gastroenterology;  Laterality: N/A;  pre- constipation  post- poor prep    CYSTOSCOPY W/ URETERAL STENT PLACEMENT  06/04/2016    Dr. Teddy Kaba    ENDOSCOPY N/A 2/20/2019    Procedure: ESOPHAGOGASTRODUODENOSCOPY with Biopsies;  Surgeon: Nino Murphy MD;  Location: [facility] ENDOSCOPY;  Service: Gastroenterology    ENDOSCOPY N/A 11/10/2023    Procedure: ESOPHAGOGASTRODUODENOSCOPY;  Surgeon: Nino Murphy MD;  Location: [facility] ENDOSCOPY;  Service: Gastroenterology;  Laterality: N/A;  pre- reflux  post-    ENDOSCOPY AND COLONOSCOPY N/A 04/23/2015    tubular adenoma rectal polyp    KNEE ARTHROSCOPY WITH OPEN LIGAMENT REPAIR Bilateral 1980, 1994    right knee: 1980, left knee: 1994    ORIF TIBIA/FIBULA FRACTURES Left 1980's    SHOULDER SURGERY Bilateral     " Bicep repair on left, broken shoulder on right     WISDOM TOOTH EXTRACTION Bilateral         Current Outpatient Medications on File Prior to Visit   Medication Sig Dispense Refill    acetaminophen (TYLENOL) 500 MG tablet Take 1 tablet by mouth Every 6 (Six) Hours As Needed for Mild Pain.      albuterol sulfate  (90 Base) MCG/ACT inhaler Inhale 2 puffs Every 6 (Six) Hours As Needed for Wheezing or Shortness of Air. 18 g 1    diphenhydrAMINE (BENADRYL) 50 MG capsule Take 1 capsule by mouth Every 6 (Six) Hours As Needed for Itching.      HYDROcodone-acetaminophen (NORCO) 7.5-325 MG per tablet Take 1 tablet by mouth Every 6 (Six) Hours As Needed for Moderate Pain.      naloxone (NARCAN) 4 MG/0.1ML nasal spray       meclizine (ANTIVERT) 12.5 MG tablet Take 1 tablet by mouth 3 (Three) Times a Day As Needed for Dizziness. (Patient not taking: Reported on 8/20/2024) 30 tablet 1    mupirocin (BACTROBAN) 2 % ointment Apply 1 Application topically to the appropriate area as directed 3 (Three) Times a Day. (Patient not taking: Reported on 8/20/2024) 30 g 1     No current facility-administered medications on file prior to visit.        ALLERGIES:    Allergies   Allergen Reactions    Contrast Dye (Echo Or Unknown Ct/Mr) Hives    Erythromycin Unknown - High Severity     Father was allergic-patient was told not to take due to father's reaction (heart stopped)    Penicillins Unknown - High Severity     Severe reaction as a child    Gabapentin Hives        Social History     Socioeconomic History    Marital status:      Spouse name: Corrine    Years of education: High School   Tobacco Use    Smoking status: Some Days     Types: Cigars    Smokeless tobacco: Never    Tobacco comments:     a few cigars a year    Vaping Use    Vaping status: Former   Substance and Sexual Activity    Alcohol use: Yes     Alcohol/week: 2.0 standard drinks of alcohol     Types: 1 Shots of liquor, 1 Drinks containing 0.5 oz of alcohol per week     " Comment: socially    Drug use: No    Sexual activity: Yes     Partners: Female        Family History   Problem Relation Age of Onset    Heart attack Mother     Heart disease Mother     Heart failure Mother     Colon cancer Father 47    Colon polyps Father     Heart disease Father     Diabetes Father     Heart failure Father     Malig Hyperthermia Neg Hx         Review of Systems   ROS as per HPI  I have reviewed and confirmed the accuracy of the ROS as documented by the MA/LPN/RN Magalis hCamorro MD  Patient had complained of mild left lower quadrant abdominal discomfort likely secondary to constipation as he does get constipated      Objective      Vitals:    08/20/24 1549   BP: 116/74   Pulse: 64   Temp: 98.4 °F (36.9 °C)   TempSrc: Oral   SpO2: 95%   Weight: 100 kg (221 lb)   Height: 177.8 cm (70\")           8/20/2024     3:44 PM   Current Status   ECOG score 0       Physical Exam      CONSTITUTIONAL:  Vital signs reviewed.  No distress, looks comfortable.  EYES:  Conjunctivae and lids unremarkable.  PERRLA  EARS,NOSE,MOUTH,THROAT:  Ears and nose appear unremarkable.  Lips, teeth, gums appear unremarkable.  RESPIRATORY:  Normal respiratory effort.  Lungs clear to auscultation bilaterally.  CARDIOVASCULAR:  Normal S1, S2.  No murmurs rubs or gallops.  No significant lower extremity edema.  GASTROINTESTINAL: Abdomen appears unremarkable.  Nontender.  No hepatomegaly.  No splenomegaly.  Bowel sounds present there is no evidence of any abdominal masses or tenderness.  Specifically no abnormalities felt in the left lower quadrant of the abdomen and no tenderness  SKIN:  Warm.  No rashes.  PSYCHIATRIC:  Normal judgment and insight.  Normal mood and affect.    I have reexamined the patient and the results are consistent with the previously documented exam. Magalis Chamorro MD     RECENT LABS:  Results from last 7 days   Lab Units 08/20/24  1515   WBC 10*3/mm3 6.48   NEUTROS ABS 10*3/mm3 2.85   HEMOGLOBIN g/dL 15.7 "   HEMATOCRIT % 45.8   PLATELETS 10*3/mm3 257     Results from last 7 days   Lab Units 08/20/24  1515   SODIUM mmol/L 140   POTASSIUM mmol/L 4.3   CHLORIDE mmol/L 103   CO2 mmol/L 28.6   BUN mg/dL 18   CREATININE mg/dL 1.06   CALCIUM mg/dL 8.9   ALBUMIN g/dL 3.8   BILIRUBIN mg/dL 0.3   ALK PHOS U/L 80   ALT (SGPT) U/L 15   AST (SGOT) U/L 28   GLUCOSE mg/dL 102*           CT NECK, CHEST, ABDOMEN, AND PELVIS WITHOUT IV CONTRAST. 11/23/20    IMPRESSION:  Stable examination. There is no evidence for metastatic  disease and there is no new abnormality.       EMERGENCY PA AND LATERAL CHEST X-RAY 07/07/2020     IMPRESSION:  Slightly low lung volumes with horizontal linear areas of  atelectasis or scarring at the lung bases, otherwise no active disease  is seen in the chest.       CT NECK, CHEST, ABDOMEN, AND PELVIS WITH IV CONTRAST 06/17/20  IMPRESSION:  There is no evidence for metastatic disease and there is no  new abnormality.    RECENT IMAGING:   10/30/19 - CT Chest Abdomen Pelvis  IMPRESSION:  Stable examination. There is no evidence for metastatic  disease and there is no new abnormality.    06/25/19 - Genetics Scan  Negative    Assessment & Plan    1.  T2 N0 moderately differentiated adenocarcinoma of the upper rectum, newly diagnosed status post low anterior resection by Dr. Gardner.  Patient initially presented with bright red blood per rectum.  This was lasting 4-5 months.  He then underwent EGD colonoscopy by Dr. Murphy.  EGD was negative.  But the colonoscopy showed evidence of an lesion in the upper rectum which was 3 x 2.1 cm and it was  biopsied and consistent with moderately differentiated adenocarcinoma.  Patient was referred to Dr. Gardner and his CEA was 4.97 prior to surgery.  Patient then underwent EGD as well which was negative.  The 2 polyps in the descending colon were negative.    Patient underwent low anterior resection which on pathology shows a 2.5 cm mass extending on into the submucosa with  margins clear and not involving the lymphovascular or perineural invasion.  There is 0 out of 12 lymph nodes positive.  And the tumor was situated in the upper rectum.  Reviewed CT scan which is negative except 2 lucent lesions on T3 and T5, suggested MRI of the thoracic spine  MRI done on 04/18/19 is normal without evidence of malignancy and lesion on T3 and T5 are hemangioma's.   Bone scan is negative  Followed up with observation with CT scans in 6 months.   CT scans from November 23, 2020 are negative.  Colonoscopy done August 2020 showed 9 mm polyp in the rectum resected and is consistent with adenoma  October 17, 2023: Patient is having constipation alternating with diarrhea.   10/9/2023 CT of the chest abdomen pelvis negative  11/10/2023 EGD and colonoscopy were negative  Colonoscopy noted constipation, prescribed Linzess with improvement in bowels.  Recommendations for 2-year follow-up  1/9/2024, CEA is pending.  Clinically the patient feels very well.  He will remain in observation with 6-month follow-up.  August 28, 2024: Reviewed colonoscopy EGD from October 2024 and is negative a 2-year follow-up was suggested by Dr. Murphy.  Reviewed CT scan from October 2023 and is negative.  Patient complaining of some left lower quadrant abdominal discomfort but he also has constipation and on clinical examination there is no evidence of any abdominal masses or tenderness.  He is to call us if the pain gets worse.  He also has appointment with his primary care physician.  Given recent scans and scopes have been negative and the fact that he has gained weight with a normal CEA will follow with observation    2.  Family history of colon cancer with father having colon cancer.  Patient is to follow up with  on June 6th, 2019    Plan   Colonoscopy and EGD from November 2023 reviewed today.  Patient will remain in observation regarding his previous malignancy  CEA is pending today  Reviewed CT scan of the chest  abdomen pelvis from October 2023 and is negative  Patient to call us earlier for any problems  Patient with chronic pain in the back for which she has been on hydrocodone given to him by his pain medicine physician  6-month follow-up with Dr. Morales with CBC, CMP, CEA and since he would have completed 5 years he could be seen on a yearly basis.      Magalis Chamorro MD  08/20/2024       CC: Dr. Matthew Roa MD

## 2024-10-28 DIAGNOSIS — R06.02 SOB (SHORTNESS OF BREATH): ICD-10-CM

## 2024-10-29 RX ORDER — ALBUTEROL SULFATE 90 UG/1
2 INHALANT RESPIRATORY (INHALATION) EVERY 6 HOURS PRN
Qty: 18 G | Refills: 1 | Status: SHIPPED | OUTPATIENT
Start: 2024-10-29

## 2025-01-09 ENCOUNTER — HOSPITAL ENCOUNTER (OUTPATIENT)
Dept: GENERAL RADIOLOGY | Facility: HOSPITAL | Age: 55
Discharge: HOME | End: 2025-01-09
Payer: COMMERCIAL

## 2025-01-09 ENCOUNTER — TELEPHONE (OUTPATIENT)
Dept: FAMILY MEDICINE CLINIC | Age: 55
End: 2025-01-09

## 2025-01-09 ENCOUNTER — LAB (OUTPATIENT)
Dept: LAB | Facility: HOSPITAL | Age: 55
End: 2025-01-09
Payer: COMMERCIAL

## 2025-01-09 ENCOUNTER — OFFICE VISIT (OUTPATIENT)
Dept: FAMILY MEDICINE CLINIC | Age: 55
End: 2025-01-09
Payer: COMMERCIAL

## 2025-01-09 VITALS
HEART RATE: 62 BPM | BODY MASS INDEX: 31.64 KG/M2 | WEIGHT: 221 LBS | HEIGHT: 70 IN | SYSTOLIC BLOOD PRESSURE: 139 MMHG | OXYGEN SATURATION: 95 % | TEMPERATURE: 97.8 F | DIASTOLIC BLOOD PRESSURE: 81 MMHG

## 2025-01-09 DIAGNOSIS — Z00.00 ANNUAL PHYSICAL EXAM: Primary | ICD-10-CM

## 2025-01-09 DIAGNOSIS — Z13.1 SCREENING FOR DIABETES MELLITUS: ICD-10-CM

## 2025-01-09 DIAGNOSIS — Z13.220 SCREENING, LIPID: ICD-10-CM

## 2025-01-09 DIAGNOSIS — R10.84 GENERALIZED ABDOMINAL PAIN: ICD-10-CM

## 2025-01-09 DIAGNOSIS — H81.13 BENIGN PAROXYSMAL POSITIONAL VERTIGO DUE TO BILATERAL VESTIBULAR DISORDER: ICD-10-CM

## 2025-01-09 DIAGNOSIS — K59.09 OTHER CONSTIPATION: Primary | ICD-10-CM

## 2025-01-09 DIAGNOSIS — Z12.5 SCREENING FOR PROSTATE CANCER: ICD-10-CM

## 2025-01-09 DIAGNOSIS — Z11.59 NEED FOR HEPATITIS C SCREENING TEST: ICD-10-CM

## 2025-01-09 LAB
ALBUMIN SERPL-MCNC: 3.8 G/DL (ref 3.5–5.2)
ALBUMIN/GLOB SERPL: 1.3 G/DL
ALP SERPL-CCNC: 75 U/L (ref 39–117)
ALT SERPL W P-5'-P-CCNC: 19 U/L (ref 1–41)
ANION GAP SERPL CALCULATED.3IONS-SCNC: 7.7 MMOL/L (ref 5–15)
AST SERPL-CCNC: 24 U/L (ref 1–40)
BASOPHILS # BLD AUTO: 0.03 10*3/MM3 (ref 0–0.2)
BASOPHILS NFR BLD AUTO: 0.4 % (ref 0–1.5)
BILIRUB SERPL-MCNC: 0.3 MG/DL (ref 0–1.2)
BILIRUB UR QL STRIP: NEGATIVE
BUN SERPL-MCNC: 17 MG/DL (ref 6–20)
BUN/CREAT SERPL: 15.6 (ref 7–25)
CALCIUM SPEC-SCNC: 8.8 MG/DL (ref 8.6–10.5)
CHLORIDE SERPL-SCNC: 105 MMOL/L (ref 98–107)
CHOLEST SERPL-MCNC: 171 MG/DL (ref 0–200)
CLARITY UR: CLEAR
CO2 SERPL-SCNC: 25.3 MMOL/L (ref 22–29)
COLOR UR: YELLOW
CREAT SERPL-MCNC: 1.09 MG/DL (ref 0.76–1.27)
DEPRECATED RDW RBC AUTO: 39.2 FL (ref 37–54)
EGFRCR SERPLBLD CKD-EPI 2021: 80.2 ML/MIN/1.73
EOSINOPHIL # BLD AUTO: 0.23 10*3/MM3 (ref 0–0.4)
EOSINOPHIL NFR BLD AUTO: 3.4 % (ref 0.3–6.2)
ERYTHROCYTE [DISTWIDTH] IN BLOOD BY AUTOMATED COUNT: 11.6 % (ref 12.3–15.4)
GLOBULIN UR ELPH-MCNC: 2.9 GM/DL
GLUCOSE SERPL-MCNC: 97 MG/DL (ref 65–99)
GLUCOSE UR STRIP-MCNC: NEGATIVE MG/DL
HBA1C MFR BLD: 5.7 % (ref 4.8–5.6)
HCT VFR BLD AUTO: 46.7 % (ref 37.5–51)
HCV AB SER QL: NORMAL
HDLC SERPL-MCNC: 43 MG/DL (ref 40–60)
HGB BLD-MCNC: 15.9 G/DL (ref 13–17.7)
HGB UR QL STRIP.AUTO: NEGATIVE
IMM GRANULOCYTES # BLD AUTO: 0.02 10*3/MM3 (ref 0–0.05)
IMM GRANULOCYTES NFR BLD AUTO: 0.3 % (ref 0–0.5)
KETONES UR QL STRIP: NEGATIVE
LDLC SERPL CALC-MCNC: 109 MG/DL (ref 0–100)
LDLC/HDLC SERPL: 2.5 {RATIO}
LEUKOCYTE ESTERASE UR QL STRIP.AUTO: NEGATIVE
LIPASE SERPL-CCNC: 36 U/L (ref 13–60)
LYMPHOCYTES # BLD AUTO: 1.39 10*3/MM3 (ref 0.7–3.1)
LYMPHOCYTES NFR BLD AUTO: 20.6 % (ref 19.6–45.3)
MCH RBC QN AUTO: 30.9 PG (ref 26.6–33)
MCHC RBC AUTO-ENTMCNC: 34 G/DL (ref 31.5–35.7)
MCV RBC AUTO: 90.9 FL (ref 79–97)
MONOCYTES # BLD AUTO: 0.76 10*3/MM3 (ref 0.1–0.9)
MONOCYTES NFR BLD AUTO: 11.2 % (ref 5–12)
NEUTROPHILS NFR BLD AUTO: 4.33 10*3/MM3 (ref 1.7–7)
NEUTROPHILS NFR BLD AUTO: 64.1 % (ref 42.7–76)
NITRITE UR QL STRIP: NEGATIVE
PH UR STRIP.AUTO: 5.5 [PH] (ref 5–8)
PLATELET # BLD AUTO: 248 10*3/MM3 (ref 140–450)
PMV BLD AUTO: 8.8 FL (ref 6–12)
POTASSIUM SERPL-SCNC: 4.6 MMOL/L (ref 3.5–5.2)
PROT SERPL-MCNC: 6.7 G/DL (ref 6–8.5)
PROT UR QL STRIP: NEGATIVE
PSA SERPL-MCNC: 0.43 NG/ML (ref 0–4)
RBC # BLD AUTO: 5.14 10*6/MM3 (ref 4.14–5.8)
SODIUM SERPL-SCNC: 138 MMOL/L (ref 136–145)
SP GR UR STRIP: >=1.03 (ref 1–1.03)
TRIGL SERPL-MCNC: 103 MG/DL (ref 0–150)
TSH SERPL DL<=0.05 MIU/L-ACNC: 1.46 UIU/ML (ref 0.27–4.2)
UROBILINOGEN UR QL STRIP: NORMAL
VLDLC SERPL-MCNC: 19 MG/DL (ref 5–40)
WBC NRBC COR # BLD AUTO: 6.76 10*3/MM3 (ref 3.4–10.8)

## 2025-01-09 PROCEDURE — 83036 HEMOGLOBIN GLYCOSYLATED A1C: CPT

## 2025-01-09 PROCEDURE — 99396 PREV VISIT EST AGE 40-64: CPT | Performed by: NURSE PRACTITIONER

## 2025-01-09 PROCEDURE — 80061 LIPID PANEL: CPT

## 2025-01-09 PROCEDURE — 81003 URINALYSIS AUTO W/O SCOPE: CPT

## 2025-01-09 PROCEDURE — 83690 ASSAY OF LIPASE: CPT

## 2025-01-09 PROCEDURE — 74018 RADEX ABDOMEN 1 VIEW: CPT

## 2025-01-09 PROCEDURE — 86803 HEPATITIS C AB TEST: CPT

## 2025-01-09 PROCEDURE — 36415 COLL VENOUS BLD VENIPUNCTURE: CPT

## 2025-01-09 PROCEDURE — 83013 H PYLORI (C-13) BREATH: CPT

## 2025-01-09 PROCEDURE — 80050 GENERAL HEALTH PANEL: CPT

## 2025-01-09 PROCEDURE — G0103 PSA SCREENING: HCPCS

## 2025-01-09 RX ORDER — AMOXICILLIN 250 MG
2 CAPSULE ORAL DAILY
Qty: 180 TABLET | Refills: 0 | Status: SHIPPED | OUTPATIENT
Start: 2025-01-09

## 2025-01-09 NOTE — PROGRESS NOTES
No acute findings on his x-ray of his abdomen.  States it does have a significant, moderate amount of stool seen throughout the colon.  We discussed this is office visit but I feel that he needs to be on something to help with his bowels which could be causing his problems we will start Senokot.  If his symptoms are not improving then when he did follow-up in the office for further testing

## 2025-01-09 NOTE — PROGRESS NOTES
Chief Complaint  Dizziness (CASPER from [place]- Dizziness when laying down ) and Abdominal Pain (X2 months )    Subjective        Jamil Kaamra presents to [facility] today to establish care with new primary care provider.  Previously seeing CALLUM Golden, who is leaving this practice.    No chronic problems with DM, HTN or cholesterol problems. Did have rectal Ca in 2018, had surgically removed, was being followed by Dr Chamorro, but she is retiring, so he will be seeing a new Md in July, follows up about every 1 year. Last CLN done 11/10/23 with Dr Murphy. Suppose to have PET scan but has not been able to do that due to allergies to the dye. Has FU with Dr Morales , hematology , oncology 3/3/2025.     Having dizziness when laying down, laying on right or left side, does have some dizziness with standing up at times. Seen by PT and tried Eply's maneuver with some improvement.     Also having abdominal pain x 2 months. Does have some reflux type symptoms, has abdominal fullness , pain throughout the entire abdomen, some nausea, no vomiting, chronic constipation. Does not take anything for constipation. Was taking small bottles of clear liquid but did not help with constipation. Does take detox pills otc and that seems to help.          Current Outpatient Medications:     acetaminophen (TYLENOL) 500 MG tablet, Take 1 tablet by mouth Every 6 (Six) Hours As Needed for Mild Pain., Disp: , Rfl:     albuterol sulfate  (90 Base) MCG/ACT inhaler, Inhale 2 puffs Every 6 (Six) Hours As Needed for Wheezing or Shortness of Air., Disp: 18 g, Rfl: 1    diphenhydrAMINE (BENADRYL) 50 MG capsule, Take 1 capsule by mouth Every 6 (Six) Hours As Needed for Itching., Disp: , Rfl:     HYDROcodone-acetaminophen (NORCO) 7.5-325 MG per tablet, Take 1 tablet by mouth Every 6 (Six) Hours As Needed for Moderate Pain., Disp: , Rfl:     naloxone (NARCAN) 4 MG/0.1ML nasal spray, , Disp: , Rfl:      "tiZANidine (ZANAFLEX) 4 MG tablet, Take 1 tablet by mouth Every 6 (Six) Hours As Needed., Disp: , Rfl:     meclizine (ANTIVERT) 12.5 MG tablet, Take 1 tablet by mouth 3 (Three) Times a Day As Needed for Dizziness. (Patient not taking: Reported on 1/9/2025), Disp: 30 tablet, Rfl: 1  Medications Discontinued During This Encounter   Medication Reason    mupirocin (BACTROBAN) 2 % ointment *Therapy completed         Allergies:  Contrast dye (echo or unknown ct/mr), Erythromycin, Penicillins, and Gabapentin      Objective   Vital Signs:   Vitals:    01/09/25 0932   BP: 139/81   BP Location: Left arm   Patient Position: Sitting   Cuff Size: Adult   Pulse: 62   Temp: 97.8 °F (36.6 °C)   TempSrc: Oral   SpO2: 95%   Weight: 100 kg (221 lb)   Height: 177.8 cm (70\")     Body mass index is 31.71 kg/m².          Physical Exam  Constitutional:       Appearance: Normal appearance.   HENT:      Right Ear: Tympanic membrane normal. There is no impacted cerumen.      Left Ear: Tympanic membrane normal. There is no impacted cerumen.      Nose: No congestion.      Mouth/Throat:      Pharynx: No oropharyngeal exudate or posterior oropharyngeal erythema.   Neck:      Vascular: No carotid bruit.   Cardiovascular:      Rate and Rhythm: Normal rate and regular rhythm.      Heart sounds: Normal heart sounds.   Pulmonary:      Effort: Pulmonary effort is normal.      Breath sounds: Normal breath sounds.   Abdominal:      General: Bowel sounds are normal.      Tenderness: There is abdominal tenderness (generalized througout abdomen).   Musculoskeletal:         General: Tenderness present. Normal range of motion.   Skin:     General: Skin is warm and dry.   Neurological:      General: No focal deficit present.      Mental Status: He is alert.   Psychiatric:         Mood and Affect: Mood normal.         Behavior: Behavior normal.             Lab Results   Component Value Date    GLUCOSE 102 (H) 08/20/2024    BUN 18 08/20/2024    CREATININE 1.06 " 08/20/2024    EGFRIFNONA 86 09/23/2021    BCR 17.0 08/20/2024    K 4.3 08/20/2024    CO2 28.6 08/20/2024    CALCIUM 8.9 08/20/2024    ALBUMIN 3.8 08/20/2024    AST 28 08/20/2024    ALT 15 08/20/2024       Lab Results   Component Value Date    CHOL 141 05/31/2017    TRIG 150 05/31/2017    HDL 43 05/31/2017    LDL 68 05/31/2017       Lab Results   Component Value Date    WBC 6.48 08/20/2024    HGB 15.7 08/20/2024    HCT 45.8 08/20/2024    MCV 92.5 08/20/2024     08/20/2024           Procedures         Diagnoses and all orders for this visit:    1. Annual physical exam (Primary)    2. Generalized abdominal pain  -     Comprehensive Metabolic Panel; Future  -     CBC & Differential; Future  -     Urinalysis With Culture If Indicated -; Future  -     Lipase; Future  -     H. Pylori Breath Test - Breath, Lung; Future  -     XR Abdomen KUB; Future    3. Benign paroxysmal positional vertigo due to bilateral vestibular disorder  -     Comprehensive Metabolic Panel; Future  -     CBC & Differential; Future  -     TSH; Future    4. Need for hepatitis C screening test  -     Hepatitis C Antibody; Future    5. Screening, lipid  -     Lipid Panel; Future    6. Screening for prostate cancer  -     PSA Screen; Future    7. Screening for diabetes mellitus  -     Hemoglobin A1c; Future            Follow Up  Return in about 6 months (around 7/9/2025), or if symptoms worsen or fail to improve, for will call with lab results, will call with imaging results , next steps.  Patient was given instructions and counseling regarding his condition or for health maintenance advice. Please see specific information pulled into the AVS if appropriate.     Depending on labs and imaging results, may need a CT with oral contrast and without IV contrast due to allergy.  Plans discussed with patient.      Damian Tsai, APRN  01/09/2025    Please note that portions of this document were completed using a voice recognition program.

## 2025-01-10 LAB — UREA BREATH TEST QL: NEGATIVE

## 2025-01-13 ENCOUNTER — TELEPHONE (OUTPATIENT)
Dept: FAMILY MEDICINE CLINIC | Age: 55
End: 2025-01-13
Payer: COMMERCIAL

## 2025-01-13 DIAGNOSIS — E78.5 HYPERLIPIDEMIA, UNSPECIFIED HYPERLIPIDEMIA TYPE: Primary | ICD-10-CM

## 2025-01-13 NOTE — TELEPHONE ENCOUNTER
Provider: DE MARCIA THOMPSON    Caller: Jamil Kamara    Relationship to Patient: Self         Phone Number: [phone]      Reason for Call:      THE PATIENT SAID HIS WIFE  DROPPED OFF HIS [place]  PAPERWORK. HE SAID THAT IS WAS SUPPOSED TO HAVE A STICKY NOTE SAYING IT WAS FOR HIS BACK. HE SAID THE STICKY NOTE FELL OFF SO HE IS CALLING TO INFORM. HE SAID IT CAN BE THE SAME THING FROM WHEN DR DUKES FILLED IT OUT       PLEASE ADVISE

## 2025-02-27 DIAGNOSIS — R06.02 SOB (SHORTNESS OF BREATH): ICD-10-CM

## 2025-02-28 RX ORDER — ALBUTEROL SULFATE 90 UG/1
2 INHALANT RESPIRATORY (INHALATION) EVERY 6 HOURS PRN
Qty: 18 G | Refills: 1 | Status: SHIPPED | OUTPATIENT
Start: 2025-02-28

## 2025-03-03 ENCOUNTER — LAB (OUTPATIENT)
Dept: LAB | Facility: HOSPITAL | Age: 55
End: 2025-03-03
Payer: COMMERCIAL

## 2025-03-03 ENCOUNTER — OFFICE VISIT (OUTPATIENT)
Dept: ONCOLOGY | Facility: CLINIC | Age: 55
End: 2025-03-03
Payer: COMMERCIAL

## 2025-03-03 VITALS
DIASTOLIC BLOOD PRESSURE: 80 MMHG | TEMPERATURE: 97.6 F | WEIGHT: 223.1 LBS | HEART RATE: 70 BPM | HEIGHT: 70 IN | OXYGEN SATURATION: 94 % | BODY MASS INDEX: 31.94 KG/M2 | SYSTOLIC BLOOD PRESSURE: 122 MMHG

## 2025-03-03 DIAGNOSIS — C20 RECTAL CARCINOMA: ICD-10-CM

## 2025-03-03 DIAGNOSIS — Z08 ENCOUNTER FOR COLONOSCOPY FOLLOWING SURGERY FOR RECTAL CANCER: ICD-10-CM

## 2025-03-03 DIAGNOSIS — K59.09 CHRONIC CONSTIPATION: ICD-10-CM

## 2025-03-03 DIAGNOSIS — C20 RECTAL CARCINOMA: Primary | ICD-10-CM

## 2025-03-03 DIAGNOSIS — Z85.048 ENCOUNTER FOR COLONOSCOPY FOLLOWING SURGERY FOR RECTAL CANCER: ICD-10-CM

## 2025-03-03 LAB
ALBUMIN SERPL-MCNC: 4 G/DL (ref 3.5–5.2)
ALBUMIN/GLOB SERPL: 1.5 G/DL
ALP SERPL-CCNC: 76 U/L (ref 39–117)
ALT SERPL W P-5'-P-CCNC: 25 U/L (ref 1–41)
ANION GAP SERPL CALCULATED.3IONS-SCNC: 10.5 MMOL/L (ref 5–15)
AST SERPL-CCNC: 28 U/L (ref 1–40)
BASOPHILS # BLD AUTO: 0.05 10*3/MM3 (ref 0–0.2)
BASOPHILS NFR BLD AUTO: 0.7 % (ref 0–1.5)
BILIRUB SERPL-MCNC: 0.4 MG/DL (ref 0–1.2)
BUN SERPL-MCNC: 15 MG/DL (ref 6–20)
BUN/CREAT SERPL: 16.3 (ref 7–25)
CALCIUM SPEC-SCNC: 9.2 MG/DL (ref 8.6–10.5)
CEA SERPL-MCNC: 3.46 NG/ML
CHLORIDE SERPL-SCNC: 105 MMOL/L (ref 98–107)
CO2 SERPL-SCNC: 23.5 MMOL/L (ref 22–29)
CREAT SERPL-MCNC: 0.92 MG/DL (ref 0.76–1.27)
DEPRECATED RDW RBC AUTO: 38.7 FL (ref 37–54)
EGFRCR SERPLBLD CKD-EPI 2021: 98.2 ML/MIN/1.73
EOSINOPHIL # BLD AUTO: 0.28 10*3/MM3 (ref 0–0.4)
EOSINOPHIL NFR BLD AUTO: 4.1 % (ref 0.3–6.2)
ERYTHROCYTE [DISTWIDTH] IN BLOOD BY AUTOMATED COUNT: 11.8 % (ref 12.3–15.4)
GLOBULIN UR ELPH-MCNC: 2.7 GM/DL
GLUCOSE SERPL-MCNC: 105 MG/DL (ref 65–99)
HCT VFR BLD AUTO: 47.5 % (ref 37.5–51)
HGB BLD-MCNC: 16.3 G/DL (ref 13–17.7)
IMM GRANULOCYTES # BLD AUTO: 0.03 10*3/MM3 (ref 0–0.05)
IMM GRANULOCYTES NFR BLD AUTO: 0.4 % (ref 0–0.5)
LYMPHOCYTES # BLD AUTO: 1.94 10*3/MM3 (ref 0.7–3.1)
LYMPHOCYTES NFR BLD AUTO: 28.4 % (ref 19.6–45.3)
MCH RBC QN AUTO: 31 PG (ref 26.6–33)
MCHC RBC AUTO-ENTMCNC: 34.3 G/DL (ref 31.5–35.7)
MCV RBC AUTO: 90.3 FL (ref 79–97)
MONOCYTES # BLD AUTO: 0.86 10*3/MM3 (ref 0.1–0.9)
MONOCYTES NFR BLD AUTO: 12.6 % (ref 5–12)
NEUTROPHILS NFR BLD AUTO: 3.67 10*3/MM3 (ref 1.7–7)
NEUTROPHILS NFR BLD AUTO: 53.8 % (ref 42.7–76)
NRBC BLD AUTO-RTO: 0 /100 WBC (ref 0–0.2)
PLATELET # BLD AUTO: 268 10*3/MM3 (ref 140–450)
PMV BLD AUTO: 8.9 FL (ref 6–12)
POTASSIUM SERPL-SCNC: 4.2 MMOL/L (ref 3.5–5.2)
PROT SERPL-MCNC: 6.7 G/DL (ref 6–8.5)
RBC # BLD AUTO: 5.26 10*6/MM3 (ref 4.14–5.8)
SODIUM SERPL-SCNC: 139 MMOL/L (ref 136–145)
WBC NRBC COR # BLD AUTO: 6.83 10*3/MM3 (ref 3.4–10.8)

## 2025-03-03 PROCEDURE — 80053 COMPREHEN METABOLIC PANEL: CPT

## 2025-03-03 PROCEDURE — 82378 CARCINOEMBRYONIC ANTIGEN: CPT | Performed by: INTERNAL MEDICINE

## 2025-03-03 PROCEDURE — 85025 COMPLETE CBC W/AUTO DIFF WBC: CPT

## 2025-03-03 NOTE — PROGRESS NOTES
"    .     REASONS FOR FOLLOWUP: Rectal cancer    HISTORY OF PRESENT ILLNESS:  The patient is a 54 y.o. year old male  who is here for follow-up with the above-mentioned history.    No new problems.  Chronic constipation.  Takes over-the-counter medicines for this.  No bleeding.  No problems eating.    Past Medical History:   Diagnosis Date    Abdominal pain     Allergic     Allergic rhinitis     Arthritis     Colon cancer 02/20/2019    Invasive moderately differentiated adenocarcinoma    Colon polyps     Constipation     Diverticulosis     Dry mouth     Fatty liver 02/07/2019    Gas pain     GERD (gastroesophageal reflux disease)     H/O Acute GI bleeding 2015    Insomnia     \"nerves\"    Kidney stones     in past    Migraines     Rectal bleeding     Renal disorder     Stiff neck     holding neck still to avoid dizziness    Syncope     Tattoos     Ulcer of abdomen wall 2013     Past Surgical History:   Procedure Laterality Date    APPENDECTOMY N/A     COLON RESECTION N/A 3/1/2019    Procedure: COLON RESECTION LAPAROSCOPIC LOW ANTERIOR, flexible sigmoidoscopy;  Surgeon: Matthew Gardner MD;  Location: [facility] MAIN OR;  Service: General    COLONOSCOPY N/A 2/20/2019    Procedure: COLONOSCOPY with Hot and Cold Polypectomy and Biopsies;  Surgeon: Nino Murphy MD;  Location: [facility] ENDOSCOPY;  Service: Gastroenterology    COLONOSCOPY N/A 8/14/2020    Procedure: COLONOSCOPY to cecum and TI with hot polypectomy;  Surgeon: Nino Murphy MD;  Location: [facility] ENDOSCOPY;  Service: Gastroenterology;  Laterality: N/A;  pre - hx colon ca  post - polyp, hx resection    COLONOSCOPY N/A 11/10/2023    Procedure: COLONOSCOPY into cecum;  Surgeon: Nino Murphy MD;  Location: [facility] ENDOSCOPY;  Service: Gastroenterology;  Laterality: N/A;  pre- constipation  post- poor prep    CYSTOSCOPY W/ URETERAL STENT PLACEMENT  06/04/2016    Dr. Teddy Kaba    ENDOSCOPY N/A 2/20/2019    Procedure: ESOPHAGOGASTRODUODENOSCOPY " with Biopsies;  Surgeon: Nino Murphy MD;  Location: [facility] ENDOSCOPY;  Service: Gastroenterology    ENDOSCOPY N/A 11/10/2023    Procedure: ESOPHAGOGASTRODUODENOSCOPY;  Surgeon: Nino Murphy MD;  Location: [facility] ENDOSCOPY;  Service: Gastroenterology;  Laterality: N/A;  pre- reflux  post-    ENDOSCOPY AND COLONOSCOPY N/A 04/23/2015    tubular adenoma rectal polyp    KNEE ARTHROSCOPY WITH OPEN LIGAMENT REPAIR Bilateral 1980, 1994    right knee: 1980, left knee: 1994    ORIF TIBIA/FIBULA FRACTURES Left 1980's    SHOULDER SURGERY Bilateral     Bicep repair on left, broken shoulder on right     WISDOM TOOTH EXTRACTION Bilateral        MEDICATIONS    Current Outpatient Medications:     acetaminophen (TYLENOL) 500 MG tablet, Take 1 tablet by mouth Every 6 (Six) Hours As Needed for Mild Pain., Disp: , Rfl:     albuterol sulfate  (90 Base) MCG/ACT inhaler, Inhale 2 puffs Every 6 (Six) Hours As Needed for Wheezing or Shortness of Air., Disp: 18 g, Rfl: 1    diphenhydrAMINE (BENADRYL) 50 MG capsule, Take 1 capsule by mouth Every 6 (Six) Hours As Needed for Itching., Disp: , Rfl:     HYDROcodone-acetaminophen (NORCO) 7.5-325 MG per tablet, Take 1 tablet by mouth Every 6 (Six) Hours As Needed for Moderate Pain., Disp: , Rfl:     meclizine (ANTIVERT) 12.5 MG tablet, Take 1 tablet by mouth 3 (Three) Times a Day As Needed for Dizziness. (Patient not taking: Reported on 1/9/2025), Disp: 30 tablet, Rfl: 1    naloxone (NARCAN) 4 MG/0.1ML nasal spray, , Disp: , Rfl:     sennosides-docusate (Senokot S) 8.6-50 MG per tablet, Take 2 tablets by mouth Daily., Disp: 180 tablet, Rfl: 0    tiZANidine (ZANAFLEX) 4 MG tablet, Take 1 tablet by mouth Every 6 (Six) Hours As Needed., Disp: , Rfl:     ALLERGIES:     Allergies   Allergen Reactions    Contrast Dye (Echo Or Unknown Ct/Mr) Hives    Erythromycin Unknown - High Severity     Father was allergic-patient was told not to take due to father's reaction (heart stopped)     Penicillins Unknown - High Severity     Severe reaction as a child    Gabapentin Hives       SOCIAL HISTORY:       Social History     Socioeconomic History    Marital status:      Spouse name: Corrine    Years of education: High School   Tobacco Use    Smoking status: Some Days     Types: Cigars    Smokeless tobacco: Never    Tobacco comments:     a few cigars a year    Vaping Use    Vaping status: Former   Substance and Sexual Activity    Alcohol use: Yes     Alcohol/week: 2.0 standard drinks of alcohol     Types: 1 Shots of liquor, 1 Drinks containing 0.5 oz of alcohol per week     Comment: socially    Drug use: No    Sexual activity: Yes     Partners: Female         FAMILY HISTORY:  Family History   Problem Relation Age of Onset    Heart attack Mother     Heart disease Mother     Heart failure Mother     Colon cancer Father 47    Colon polyps Father     Heart disease Father     Diabetes Father     Heart failure Father     Malig Hyperthermia Neg Hx        REVIEW OF SYSTEMS:  Review of Systems   Constitutional:  Negative for activity change.   HENT:  Negative for nosebleeds and trouble swallowing.    Respiratory:  Negative for shortness of breath and wheezing.    Cardiovascular:  Negative for chest pain and palpitations.   Gastrointestinal:  Positive for constipation. Negative for diarrhea and nausea.   Genitourinary:  Negative for dysuria and hematuria.   Musculoskeletal:  Negative for arthralgias and myalgias.   Neurological:  Negative for seizures and syncope.   Hematological:  Negative for adenopathy. Does not bruise/bleed easily.   Psychiatric/Behavioral:  Negative for confusion.             There were no vitals filed for this visit.      8/20/2024     3:44 PM   Current Status   ECOG score 0        PHYSICAL EXAM:      CONSTITUTIONAL:  Vital signs reviewed.  No distress, looks comfortable.  EYES:  Conjunctiva and lids unremarkable.  PERRLA  EARS,NOSE,MOUTH,THROAT:  Ears and nose appear unremarkable.  Lips,  teeth, gums appear unremarkable.  RESPIRATORY:  Normal respiratory effort.  Lungs clear to auscultation bilaterally.  CARDIOVASCULAR:  Normal S1, S2.  No murmurs rubs or gallops.  No significant lower extremity edema.  GASTROINTESTINAL: Abdomen appears unremarkable.  Nontender.  No hepatomegaly.  No splenomegaly.  LYMPHATIC:  No cervical, supraclavicular, axillary lymphadenopathy.  MUSCULOSKELETAL:  Unremarkable gait and station.  Unremarkable digits/nails.  No cyanosis or clubbing.  SKIN:  Warm.  No rashes.  PSYCHIATRIC:  Normal judgment and insight.  Normal mood and affect.      RECENT LABS:        WBC   Date/Time Value Ref Range Status   01/09/2025 10:32 AM 6.76 3.40 - 10.80 10*3/mm3 Final     Hemoglobin   Date/Time Value Ref Range Status   01/09/2025 10:32 AM 15.9 13.0 - 17.7 g/dL Final     Platelets   Date/Time Value Ref Range Status   01/09/2025 10:32  140 - 450 10*3/mm3 Final       Assessment & Plan   Rectal carcinoma  - CBC & Differential  - Comprehensive Metabolic Panel  - CEA        Jamil Kamara   *Rectal adenocarcinoma (upper rectum)  Preoperative CEA 4.97  Low anterior resection, Dr. Gardner, 3/1/2019: 2.5 x 2.3 x 0.5 cm invasive moderately differentiated adenocarcinoma.  No LVI.  No PNI.  Margins negative.  0 of 12 nodes involved.  [id number], stage I  Therefore, did not receive adjuvant chemotherapy  Postoperative CEA 3.77 on 3/27/2019 and 5.56 on 12/16/2019.  Therefore, not a good marker of disease.    *Father with colon cancer at age 47  Genetic testing reportedly negative.  He states this is well    *Chronic pain, on hydrocodone through pain medicine physician.    *NCCN surveillance recommendations  For stage I (as this was): Repeat colonoscopy at least every 5 years.  They do not recommend CEA surveillance or CT surveillance.  For stage II or above in which they recommend CEA and CT surveillance the recommendation is to stop after 5 years.    *3/3/2025: Initial visit with me (prior  patient of Dr. Chamorro's).    Plan  Last colonoscopy 11/10/2023.  Dr. Murphy planned next in 2-years, per Dr. Chamorro's notes  Since this is stage I disease and also it has been now been 6 years since resection, it would be reasonable to stop follow-up in our office.  He agrees and would like to stop follow-up with us.  I spent a long time with him discussing ways to optimize cancer prevention.  He is doing a lot of these already.     41 minutes.  Total time.  Same day.                   97

## (undated) DEVICE — FRCP BX RADJAW4 NDL 2.8 240CM LG OG BX40

## (undated) DEVICE — LN SMPL CO2 SHTRM SD STREAM W/M LUER

## (undated) DEVICE — CANN O2 ETCO2 FITS ALL CONN CO2 SMPL A/ 7IN DISP LF

## (undated) DEVICE — BITEBLOCK OMNI BLOC

## (undated) DEVICE — SKIN PREP TRAY W/CHG: Brand: MEDLINE INDUSTRIES, INC.

## (undated) DEVICE — Device: Brand: DEFENDO AIR/WATER/SUCTION AND BIOPSY VALVE

## (undated) DEVICE — ENCORE® LATEX ORTHO SIZE 7.5, STERILE LATEX POWDER-FREE SURGICAL GLOVE: Brand: ENCORE

## (undated) DEVICE — ENDOPATH XCEL UNIVERSAL TROCAR STABLILITY SLEEVES: Brand: ENDOPATH XCEL

## (undated) DEVICE — GOWN ,SIRUS,NONREINFORCED SMALL: Brand: MEDLINE

## (undated) DEVICE — MSK PROC CURAPLEX O2 2/ADAPT 7FT

## (undated) DEVICE — THE AQUASHIELD SYSTEM IS INTENDED TO BE USED WITH AN AIR SOURCE FROM AN ENDOSCOPE WITH THE PURPOSE OF SUPPLYING STERILE WATER TO THE ENDOSCOPE DURING ENDOSCOPIC PROCEDURES.: Brand: AQUASHIELD

## (undated) DEVICE — SPNG GZ WOVN 4X4IN 12PLY 10/BX STRL

## (undated) DEVICE — ENDOPATH XCEL BLADELESS TROCARS WITH STABILITY SLEEVES: Brand: ENDOPATH XCEL

## (undated) DEVICE — SNAR POLYP SENSATION STDOVL 27 240 BX40

## (undated) DEVICE — VISUALIZATION SYSTEM: Brand: CLEARIFY

## (undated) DEVICE — SENSR O2 OXIMAX FNGR A/ 18IN NONSTR

## (undated) DEVICE — ADAPT CLN BIOGUARD AIR/H2O DISP

## (undated) DEVICE — LAPAROSCOPIC SMOKE ELIMINATION DEVICE: Brand: PNEUVIEW XE

## (undated) DEVICE — HARMONIC ACE +7 LAPAROSCOPIC SHEARS ADVANCED HEMOSTASIS 5MM DIAMETER 36CM SHAFT LENGTH  FOR USE WITH GRAY HAND PIECE ONLY: Brand: HARMONIC ACE

## (undated) DEVICE — LOU LAP SIGMOID COLON: Brand: MEDLINE INDUSTRIES, INC.

## (undated) DEVICE — THE TORRENT IRRIGATION SCOPE CONNECTOR IS USED WITH THE TORRENT IRRIGATION TUBING TO PROVIDE IRRIGATION FLUIDS SUCH AS STERILE WATER DURING GASTROINTESTINAL ENDOSCOPIC PROCEDURES WHEN USED IN CONJUNCTION WITH AN IRRIGATION PUMP (OR ELECTROSURGICAL UNIT).: Brand: TORRENT

## (undated) DEVICE — TUBING, SUCTION, 1/4" X 10', STRAIGHT: Brand: MEDLINE

## (undated) DEVICE — WOUND RETRACTOR AND PROTECTOR: Brand: ALEXIS WOUND PROTECTOR-RETRACTOR

## (undated) DEVICE — CANN NASL CO2 TRULINK W/O2 A/

## (undated) DEVICE — ENDOPATH PNEUMONEEDLE INSUFFLATION NEEDLES WITH LUER LOCK CONNECTORS 120MM: Brand: ENDOPATH

## (undated) DEVICE — JACKSON-PRATT 100CC BULB RESERVOIR: Brand: CARDINAL HEALTH

## (undated) DEVICE — GLV SURG BIOGEL LTX PF 6

## (undated) DEVICE — SUT PROLN 2/0 SH 36IN 8523H

## (undated) DEVICE — DISPOSABLE GRASPER CARTRIDGE: Brand: DIRECT DRIVE REPOSABLE GRASPERS

## (undated) DEVICE — PENCL E/S HNDSWCH ROCKR CB

## (undated) DEVICE — ADHS SKIN DERMABOND TOP ADVANCED

## (undated) DEVICE — ECHELON FLEX 60 ARTICULATING ENDOSCOPIC LINEAR CUTTER (NO CARTRIDGE): Brand: ECHELON FLEX ENDOPATH

## (undated) DEVICE — SUT SILK 2/0 SH CR8 18IN CR8 C012D

## (undated) DEVICE — THE SINGLE USE ETRAP – POLYP TRAP IS USED FOR SUCTION RETRIEVAL OF ENDOSCOPICALLY REMOVED POLYPS.: Brand: ETRAP

## (undated) DEVICE — ELECTRD BLD EDGE/INSUL1P 2.4X5.1MM STRL

## (undated) DEVICE — ENDOCUT SCISSOR TIP, DISPOSABLE: Brand: RENEW

## (undated) DEVICE — SUT VIC 0 TN 27IN DYED JTN0G

## (undated) DEVICE — SUT PDS 0 CT1 36IN Z346H

## (undated) DEVICE — SUT VIC 5/0 PS2 18IN J495H

## (undated) DEVICE — TOTAL TRAY, 16FR 10ML SIL FOLEY, URN: Brand: MEDLINE

## (undated) DEVICE — SUT SILK 2/0 TIES 18IN A185H

## (undated) DEVICE — SUT SILK 0 TIES 18IN SA66G

## (undated) DEVICE — LAPAROSCOPIC GAS CONDITIONING DEVICE.: Brand: INSUFLOW

## (undated) DEVICE — COVER,MAYO STAND,STERILE: Brand: MEDLINE

## (undated) DEVICE — KT ORCA ORCAPOD DISP STRL

## (undated) DEVICE — BLCK/BITE BLOX W/DENTL/RIM W/STRAP 54F

## (undated) DEVICE — SUT SILK 3/0 TIES 18IN A184H

## (undated) DEVICE — SOL NACL 0.9PCT 1000ML

## (undated) DEVICE — Device